# Patient Record
Sex: FEMALE | Race: WHITE | NOT HISPANIC OR LATINO | URBAN - METROPOLITAN AREA
[De-identification: names, ages, dates, MRNs, and addresses within clinical notes are randomized per-mention and may not be internally consistent; named-entity substitution may affect disease eponyms.]

---

## 2017-03-31 ENCOUNTER — APPOINTMENT (RX ONLY)
Dept: URBAN - METROPOLITAN AREA CLINIC 349 | Facility: CLINIC | Age: 72
Setting detail: DERMATOLOGY
End: 2017-03-31

## 2017-03-31 DIAGNOSIS — L57.0 ACTINIC KERATOSIS: ICD-10-CM

## 2017-03-31 DIAGNOSIS — L21.8 OTHER SEBORRHEIC DERMATITIS: ICD-10-CM | Status: INADEQUATELY CONTROLLED

## 2017-03-31 PROBLEM — E78.5 HYPERLIPIDEMIA, UNSPECIFIED: Status: ACTIVE | Noted: 2017-03-31

## 2017-03-31 PROBLEM — M12.9 ARTHROPATHY, UNSPECIFIED: Status: ACTIVE | Noted: 2017-03-31

## 2017-03-31 PROBLEM — L29.8 OTHER PRURITUS: Status: ACTIVE | Noted: 2017-03-31

## 2017-03-31 PROCEDURE — 99202 OFFICE O/P NEW SF 15 MIN: CPT | Mod: 25

## 2017-03-31 PROCEDURE — 17000 DESTRUCT PREMALG LESION: CPT

## 2017-03-31 PROCEDURE — ? PRESCRIPTION

## 2017-03-31 PROCEDURE — ? COUNSELING

## 2017-03-31 PROCEDURE — ? LIQUID NITROGEN

## 2017-03-31 RX ORDER — CLOBETASOL PROPIONATE 0.46 MG/ML
SOLUTION TOPICAL
Qty: 1 | Refills: 5 | Status: ERX | COMMUNITY
Start: 2017-03-31

## 2017-03-31 RX ADMIN — CLOBETASOL PROPIONATE: 0.46 SOLUTION TOPICAL at 00:00

## 2017-03-31 ASSESSMENT — LOCATION DETAILED DESCRIPTION DERM
LOCATION DETAILED: LEFT INFERIOR CENTRAL MALAR CHEEK
LOCATION DETAILED: NASAL SUPRATIP

## 2017-03-31 ASSESSMENT — LOCATION SIMPLE DESCRIPTION DERM
LOCATION SIMPLE: NOSE
LOCATION SIMPLE: LEFT CHEEK

## 2017-03-31 ASSESSMENT — LOCATION ZONE DERM
LOCATION ZONE: FACE
LOCATION ZONE: NOSE

## 2017-03-31 ASSESSMENT — PAIN INTENSITY VAS: HOW INTENSE IS YOUR PAIN 0 BEING NO PAIN, 10 BEING THE MOST SEVERE PAIN POSSIBLE?: NO PAIN

## 2017-03-31 NOTE — PROCEDURE: LIQUID NITROGEN
Render Post-Care Instructions In Note?: no
Number Of Freeze-Thaw Cycles: 2 freeze-thaw cycles
Duration Of Freeze Thaw-Cycle (Seconds): 3
Consent: The patient's consent was obtained including but not limited to risks of crusting, scabbing, blistering, scarring, darker or lighter pigmentary change, recurrence, incomplete removal and infection.
Post-Care Instructions: I reviewed with the patient in detail post-care instructions. Patient is to wear sunprotection, and avoid picking at any of the treated lesions. Pt may apply Vaseline to crusted or scabbing areas.
Detail Level: Detailed

## 2017-04-06 PROBLEM — G62.9 NEUROPATHY: Chronic | Status: ACTIVE | Noted: 2017-04-06

## 2017-05-03 ENCOUNTER — APPOINTMENT (RX ONLY)
Dept: URBAN - METROPOLITAN AREA CLINIC 349 | Facility: CLINIC | Age: 72
Setting detail: DERMATOLOGY
End: 2017-05-03

## 2017-05-03 DIAGNOSIS — L21.8 OTHER SEBORRHEIC DERMATITIS: ICD-10-CM | Status: UNCHANGED

## 2017-05-03 PROBLEM — I25.10 ATHEROSCLEROTIC HEART DISEASE OF NATIVE CORONARY ARTERY WITHOUT ANGINA PECTORIS: Status: ACTIVE | Noted: 2017-05-03

## 2017-05-03 PROBLEM — L70.0 ACNE VULGARIS: Status: ACTIVE | Noted: 2017-05-03

## 2017-05-03 PROBLEM — H91.90 UNSPECIFIED HEARING LOSS, UNSPECIFIED EAR: Status: ACTIVE | Noted: 2017-05-03

## 2017-05-03 PROBLEM — I10 ESSENTIAL (PRIMARY) HYPERTENSION: Status: ACTIVE | Noted: 2017-05-03

## 2017-05-03 PROBLEM — K21.9 GASTRO-ESOPHAGEAL REFLUX DISEASE WITHOUT ESOPHAGITIS: Status: ACTIVE | Noted: 2017-05-03

## 2017-05-03 PROBLEM — E13.9 OTHER SPECIFIED DIABETES MELLITUS WITHOUT COMPLICATIONS: Status: ACTIVE | Noted: 2017-05-03

## 2017-05-03 PROCEDURE — ? TREATMENT REGIMEN

## 2017-05-03 PROCEDURE — ? PRESCRIPTION

## 2017-05-03 PROCEDURE — ? COUNSELING

## 2017-05-03 PROCEDURE — 99213 OFFICE O/P EST LOW 20 MIN: CPT

## 2017-05-03 RX ORDER — FLUOCINOLONE ACETONIDE 0.11 MG/ML
OIL TOPICAL
Qty: 1 | Refills: 5 | Status: ERX | COMMUNITY
Start: 2017-05-03

## 2017-05-03 RX ADMIN — FLUOCINOLONE ACETONIDE: 0.11 OIL TOPICAL at 14:24

## 2017-05-03 ASSESSMENT — LOCATION ZONE DERM
LOCATION ZONE: SCALP
LOCATION ZONE: SCALP
LOCATION ZONE: FACE

## 2017-05-03 ASSESSMENT — LOCATION SIMPLE DESCRIPTION DERM
LOCATION SIMPLE: LEFT SCALP
LOCATION SIMPLE: POSTERIOR SCALP
LOCATION SIMPLE: ANTERIOR SCALP
LOCATION SIMPLE: FRONTAL SCALP
LOCATION SIMPLE: SCALP
LOCATION SIMPLE: LEFT FOREHEAD

## 2017-05-03 ASSESSMENT — LOCATION DETAILED DESCRIPTION DERM
LOCATION DETAILED: RIGHT SUPERIOR OCCIPITAL SCALP
LOCATION DETAILED: MEDIAL FRONTAL SCALP
LOCATION DETAILED: LEFT MEDIAL FRONTAL SCALP
LOCATION DETAILED: LEFT SUPERIOR MEDIAL FOREHEAD
LOCATION DETAILED: LEFT SUPERIOR PARIETAL SCALP
LOCATION DETAILED: MID-FRONTAL SCALP

## 2018-02-01 ENCOUNTER — HOSPITAL ENCOUNTER (OUTPATIENT)
Dept: LAB | Age: 73
Discharge: HOME | End: 2018-02-01
Payer: MEDICARE

## 2018-02-01 DIAGNOSIS — R07.89 CHEST DISCOMFORT: ICD-10-CM

## 2018-02-01 DIAGNOSIS — I25.118 ATHEROSCLEROSIS OF NATIVE CORONARY ARTERY OF NATIVE HEART WITH STABLE ANGINA PECTORIS (HCC): Chronic | ICD-10-CM

## 2018-02-01 LAB
ANION GAP SERPL CALC-SCNC: 4 MMOL/L
BASOPHILS # BLD: 0 K/UL (ref 0–0.2)
BASOPHILS NFR BLD: 1 % (ref 0–2)
BUN SERPL-MCNC: 14 MG/DL (ref 8–23)
CALCIUM SERPL-MCNC: 9.6 MG/DL (ref 8.3–10.4)
CHLORIDE SERPL-SCNC: 104 MMOL/L (ref 98–107)
CO2 SERPL-SCNC: 33 MMOL/L (ref 21–32)
CREAT SERPL-MCNC: 1 MG/DL (ref 0.6–1)
DIFFERENTIAL METHOD BLD: ABNORMAL
EOSINOPHIL # BLD: 0.2 K/UL (ref 0–0.8)
EOSINOPHIL NFR BLD: 3 % (ref 0.5–7.8)
ERYTHROCYTE [DISTWIDTH] IN BLOOD BY AUTOMATED COUNT: 15.5 % (ref 11.9–14.6)
GLUCOSE SERPL-MCNC: 103 MG/DL (ref 65–100)
HCT VFR BLD AUTO: 34.8 % (ref 35.8–46.3)
HGB BLD-MCNC: 11.4 G/DL (ref 11.7–15.4)
LYMPHOCYTES # BLD: 2.8 K/UL (ref 0.5–4.6)
LYMPHOCYTES NFR BLD: 46 % (ref 13–44)
MCH RBC QN AUTO: 30.6 PG (ref 26.1–32.9)
MCHC RBC AUTO-ENTMCNC: 32.8 G/DL (ref 31.4–35)
MCV RBC AUTO: 93.3 FL (ref 79.6–97.8)
MONOCYTES # BLD: 0.7 K/UL (ref 0.1–1.3)
MONOCYTES NFR BLD: 11 % (ref 4–12)
NEUTS SEG # BLD: 2.4 K/UL (ref 1.7–8.2)
NEUTS SEG NFR BLD: 39 % (ref 43–78)
PLATELET # BLD AUTO: 271 K/UL (ref 150–450)
PMV BLD AUTO: 10.4 FL (ref 10.8–14.1)
POTASSIUM SERPL-SCNC: 4.1 MMOL/L (ref 3.5–5.1)
RBC # BLD AUTO: 3.73 M/UL (ref 4.05–5.25)
SODIUM SERPL-SCNC: 141 MMOL/L (ref 136–145)
WBC # BLD AUTO: 6 K/UL (ref 4.3–11.1)

## 2018-02-01 PROCEDURE — 80048 BASIC METABOLIC PNL TOTAL CA: CPT | Performed by: INTERNAL MEDICINE

## 2018-02-01 PROCEDURE — 85025 COMPLETE CBC W/AUTO DIFF WBC: CPT | Performed by: INTERNAL MEDICINE

## 2018-02-01 PROCEDURE — 36415 COLL VENOUS BLD VENIPUNCTURE: CPT | Performed by: INTERNAL MEDICINE

## 2018-02-08 ENCOUNTER — HOSPITAL ENCOUNTER (OUTPATIENT)
Dept: CARDIAC CATH/INVASIVE PROCEDURES | Age: 73
Setting detail: OBSERVATION
Discharge: HOME | End: 2018-02-09
Attending: INTERNAL MEDICINE | Admitting: INTERNAL MEDICINE
Payer: MEDICARE

## 2018-02-08 PROBLEM — I25.10 CAD (CORONARY ARTERY DISEASE): Status: ACTIVE | Noted: 2018-02-08

## 2018-02-08 LAB
ANION GAP SERPL CALC-SCNC: 10 MMOL/L (ref 7–16)
ATRIAL RATE: 61 BPM
BUN SERPL-MCNC: 14 MG/DL (ref 8–23)
CALCIUM SERPL-MCNC: 9.4 MG/DL (ref 8.3–10.4)
CALCULATED P AXIS, ECG09: 45 DEGREES
CALCULATED R AXIS, ECG10: -55 DEGREES
CALCULATED T AXIS, ECG11: 74 DEGREES
CHLORIDE SERPL-SCNC: 105 MMOL/L (ref 98–107)
CO2 SERPL-SCNC: 26 MMOL/L (ref 21–32)
CREAT SERPL-MCNC: 1.27 MG/DL (ref 0.6–1)
DIAGNOSIS, 93000: NORMAL
GLUCOSE BLD STRIP.AUTO-MCNC: 284 MG/DL (ref 65–100)
GLUCOSE SERPL-MCNC: 124 MG/DL (ref 65–100)
P-R INTERVAL, ECG05: 226 MS
POTASSIUM SERPL-SCNC: 4 MMOL/L (ref 3.5–5.1)
Q-T INTERVAL, ECG07: 436 MS
QRS DURATION, ECG06: 90 MS
QTC CALCULATION (BEZET), ECG08: 438 MS
SODIUM SERPL-SCNC: 141 MMOL/L (ref 136–145)
VENTRICULAR RATE, ECG03: 61 BPM

## 2018-02-08 PROCEDURE — C1769 GUIDE WIRE: HCPCS

## 2018-02-08 PROCEDURE — 80048 BASIC METABOLIC PNL TOTAL CA: CPT | Performed by: INTERNAL MEDICINE

## 2018-02-08 PROCEDURE — C1894 INTRO/SHEATH, NON-LASER: HCPCS

## 2018-02-08 PROCEDURE — 77030012468 HC VLV BLEEDBK CNTRL ABBT -B

## 2018-02-08 PROCEDURE — 74011250636 HC RX REV CODE- 250/636

## 2018-02-08 PROCEDURE — 99152 MOD SED SAME PHYS/QHP 5/>YRS: CPT

## 2018-02-08 PROCEDURE — 99153 MOD SED SAME PHYS/QHP EA: CPT

## 2018-02-08 PROCEDURE — 77030004559 HC CATH ANGI DX SUPT CARD -B

## 2018-02-08 PROCEDURE — 74011250637 HC RX REV CODE- 250/637: Performed by: INTERNAL MEDICINE

## 2018-02-08 PROCEDURE — 77030019569 HC BND COMPR RAD TERU -B

## 2018-02-08 PROCEDURE — C1874 STENT, COATED/COV W/DEL SYS: HCPCS

## 2018-02-08 PROCEDURE — 82962 GLUCOSE BLOOD TEST: CPT

## 2018-02-08 PROCEDURE — 92928 PRQ TCAT PLMT NTRAC ST 1 LES: CPT

## 2018-02-08 PROCEDURE — 74011636320 HC RX REV CODE- 636/320: Performed by: INTERNAL MEDICINE

## 2018-02-08 PROCEDURE — 74011000250 HC RX REV CODE- 250: Performed by: INTERNAL MEDICINE

## 2018-02-08 PROCEDURE — 74011000258 HC RX REV CODE- 258: Performed by: INTERNAL MEDICINE

## 2018-02-08 PROCEDURE — C1887 CATHETER, GUIDING: HCPCS

## 2018-02-08 PROCEDURE — 77030015766

## 2018-02-08 PROCEDURE — 74011250636 HC RX REV CODE- 250/636: Performed by: INTERNAL MEDICINE

## 2018-02-08 PROCEDURE — 93005 ELECTROCARDIOGRAM TRACING: CPT | Performed by: INTERNAL MEDICINE

## 2018-02-08 PROCEDURE — 99218 HC RM OBSERVATION: CPT

## 2018-02-08 PROCEDURE — C1725 CATH, TRANSLUMIN NON-LASER: HCPCS

## 2018-02-08 PROCEDURE — 93458 L HRT ARTERY/VENTRICLE ANGIO: CPT

## 2018-02-08 RX ORDER — LISINOPRIL 5 MG/1
10 TABLET ORAL DAILY
Status: DISCONTINUED | OUTPATIENT
Start: 2018-02-09 | End: 2018-02-09 | Stop reason: HOSPADM

## 2018-02-08 RX ORDER — DIPHENHYDRAMINE HYDROCHLORIDE 50 MG/ML
50 INJECTION, SOLUTION INTRAMUSCULAR; INTRAVENOUS ONCE
Status: COMPLETED | OUTPATIENT
Start: 2018-02-08 | End: 2018-02-08

## 2018-02-08 RX ORDER — ASPIRIN 81 MG/1
81 TABLET ORAL
Status: DISCONTINUED | OUTPATIENT
Start: 2018-02-08 | End: 2018-02-09 | Stop reason: HOSPADM

## 2018-02-08 RX ORDER — FAMOTIDINE 10 MG/ML
20 INJECTION INTRAVENOUS ONCE
Status: COMPLETED | OUTPATIENT
Start: 2018-02-08 | End: 2018-02-08

## 2018-02-08 RX ORDER — PREGABALIN 150 MG/1
150 CAPSULE ORAL EVERY 12 HOURS
Status: DISCONTINUED | OUTPATIENT
Start: 2018-02-08 | End: 2018-02-09 | Stop reason: HOSPADM

## 2018-02-08 RX ORDER — HYDROCORTISONE SODIUM SUCCINATE 100 MG/2ML
100 INJECTION, POWDER, FOR SOLUTION INTRAMUSCULAR; INTRAVENOUS ONCE
Status: COMPLETED | OUTPATIENT
Start: 2018-02-08 | End: 2018-02-08

## 2018-02-08 RX ORDER — ACETAMINOPHEN 325 MG/1
650 TABLET ORAL
Status: DISCONTINUED | OUTPATIENT
Start: 2018-02-08 | End: 2018-02-09 | Stop reason: HOSPADM

## 2018-02-08 RX ORDER — CLOPIDOGREL BISULFATE 75 MG/1
600 TABLET ORAL ONCE
Status: COMPLETED | OUTPATIENT
Start: 2018-02-08 | End: 2018-02-08

## 2018-02-08 RX ORDER — SODIUM CHLORIDE 0.9 % (FLUSH) 0.9 %
5-10 SYRINGE (ML) INJECTION EVERY 8 HOURS
Status: DISCONTINUED | OUTPATIENT
Start: 2018-02-08 | End: 2018-02-09 | Stop reason: HOSPADM

## 2018-02-08 RX ORDER — ROSUVASTATIN CALCIUM 20 MG/1
40 TABLET, COATED ORAL DAILY
Status: DISCONTINUED | OUTPATIENT
Start: 2018-02-09 | End: 2018-02-09 | Stop reason: HOSPADM

## 2018-02-08 RX ORDER — DIPHENHYDRAMINE HCL 25 MG
50 CAPSULE ORAL
Status: DISCONTINUED | OUTPATIENT
Start: 2018-02-08 | End: 2018-02-09 | Stop reason: HOSPADM

## 2018-02-08 RX ORDER — SODIUM CHLORIDE 9 MG/ML
100 INJECTION, SOLUTION INTRAVENOUS CONTINUOUS
Status: DISCONTINUED | OUTPATIENT
Start: 2018-02-08 | End: 2018-02-09

## 2018-02-08 RX ORDER — HEPARIN SODIUM 200 [USP'U]/100ML
3 INJECTION, SOLUTION INTRAVENOUS CONTINUOUS
Status: DISCONTINUED | OUTPATIENT
Start: 2018-02-08 | End: 2018-02-08 | Stop reason: HOSPADM

## 2018-02-08 RX ORDER — SODIUM CHLORIDE 9 MG/ML
75 INJECTION, SOLUTION INTRAVENOUS CONTINUOUS
Status: DISCONTINUED | OUTPATIENT
Start: 2018-02-08 | End: 2018-02-09

## 2018-02-08 RX ORDER — FENTANYL CITRATE 50 UG/ML
25-100 INJECTION, SOLUTION INTRAMUSCULAR; INTRAVENOUS
Status: DISCONTINUED | OUTPATIENT
Start: 2018-02-08 | End: 2018-02-08 | Stop reason: HOSPADM

## 2018-02-08 RX ORDER — CLOPIDOGREL BISULFATE 75 MG/1
75 TABLET ORAL DAILY
Status: DISCONTINUED | OUTPATIENT
Start: 2018-02-09 | End: 2018-02-09 | Stop reason: HOSPADM

## 2018-02-08 RX ORDER — DIAZEPAM 5 MG/1
5 TABLET ORAL ONCE
Status: DISCONTINUED | OUTPATIENT
Start: 2018-02-08 | End: 2018-02-08 | Stop reason: HOSPADM

## 2018-02-08 RX ORDER — GUAIFENESIN 100 MG/5ML
81 LIQUID (ML) ORAL DAILY
Status: DISCONTINUED | OUTPATIENT
Start: 2018-02-09 | End: 2018-02-08 | Stop reason: SDUPTHER

## 2018-02-08 RX ORDER — SODIUM CHLORIDE 0.9 % (FLUSH) 0.9 %
5-10 SYRINGE (ML) INJECTION AS NEEDED
Status: DISCONTINUED | OUTPATIENT
Start: 2018-02-08 | End: 2018-02-09 | Stop reason: HOSPADM

## 2018-02-08 RX ORDER — LIDOCAINE HYDROCHLORIDE 20 MG/ML
1-20 INJECTION, SOLUTION INFILTRATION; PERINEURAL
Status: DISCONTINUED | OUTPATIENT
Start: 2018-02-08 | End: 2018-02-08 | Stop reason: HOSPADM

## 2018-02-08 RX ORDER — GUAIFENESIN 100 MG/5ML
81-324 LIQUID (ML) ORAL ONCE
Status: ACTIVE | OUTPATIENT
Start: 2018-02-08 | End: 2018-02-08

## 2018-02-08 RX ORDER — DULOXETIN HYDROCHLORIDE 60 MG/1
60 CAPSULE, DELAYED RELEASE ORAL
Status: DISCONTINUED | OUTPATIENT
Start: 2018-02-08 | End: 2018-02-09 | Stop reason: HOSPADM

## 2018-02-08 RX ORDER — LORAZEPAM 1 MG/1
1 TABLET ORAL
Status: DISCONTINUED | OUTPATIENT
Start: 2018-02-08 | End: 2018-02-09 | Stop reason: HOSPADM

## 2018-02-08 RX ORDER — NITROGLYCERIN 0.4 MG/1
0.4 TABLET SUBLINGUAL
Status: DISCONTINUED | OUTPATIENT
Start: 2018-02-08 | End: 2018-02-09 | Stop reason: HOSPADM

## 2018-02-08 RX ORDER — MIDAZOLAM HYDROCHLORIDE 1 MG/ML
.5-5 INJECTION, SOLUTION INTRAMUSCULAR; INTRAVENOUS
Status: DISCONTINUED | OUTPATIENT
Start: 2018-02-08 | End: 2018-02-08 | Stop reason: HOSPADM

## 2018-02-08 RX ADMIN — HYDROCORTISONE SODIUM SUCCINATE 100 MG: 100 INJECTION, POWDER, FOR SOLUTION INTRAMUSCULAR; INTRAVENOUS at 11:00

## 2018-02-08 RX ADMIN — Medication 10 ML: at 21:00

## 2018-02-08 RX ADMIN — IOPAMIDOL 165 ML: 755 INJECTION, SOLUTION INTRAVENOUS at 13:04

## 2018-02-08 RX ADMIN — Medication 5 ML: at 15:30

## 2018-02-08 RX ADMIN — BIVALIRUDIN 1.75 MG/KG/HR: 250 INJECTION, POWDER, LYOPHILIZED, FOR SOLUTION INTRAVENOUS at 12:40

## 2018-02-08 RX ADMIN — FENTANYL CITRATE 50 MCG: 50 INJECTION, SOLUTION INTRAMUSCULAR; INTRAVENOUS at 12:25

## 2018-02-08 RX ADMIN — SODIUM CHLORIDE 75 ML/HR: 900 INJECTION, SOLUTION INTRAVENOUS at 15:14

## 2018-02-08 RX ADMIN — MIDAZOLAM HYDROCHLORIDE 2 MG: 1 INJECTION, SOLUTION INTRAMUSCULAR; INTRAVENOUS at 12:25

## 2018-02-08 RX ADMIN — LIDOCAINE HYDROCHLORIDE 60 MG: 20 INJECTION, SOLUTION INFILTRATION; PERINEURAL at 12:29

## 2018-02-08 RX ADMIN — HEPARIN 3 ML/HR: 100 SYRINGE at 12:30

## 2018-02-08 RX ADMIN — CLOPIDOGREL BISULFATE 600 MG: 75 TABLET ORAL at 13:04

## 2018-02-08 RX ADMIN — HEPARIN SODIUM 2 ML: 10000 INJECTION, SOLUTION INTRAVENOUS; SUBCUTANEOUS at 12:30

## 2018-02-08 RX ADMIN — DIPHENHYDRAMINE HYDROCHLORIDE 50 MG: 50 INJECTION, SOLUTION INTRAMUSCULAR; INTRAVENOUS at 11:00

## 2018-02-08 RX ADMIN — FAMOTIDINE 20 MG: 10 INJECTION, SOLUTION INTRAVENOUS at 11:00

## 2018-02-08 NOTE — PROGRESS NOTES
Patient received to [address] [place] [address] room # 1  Via wheelchair from [employer]. Patient scheduled for [facility] today with Dr Malachi Gross. Procedure reviewed & questions answered, voiced good understanding consent obtained & placed on chart. All medications and medical history reviewed. Will prep patient per orders. Patient & family updated on plan of care.

## 2018-02-08 NOTE — PROGRESS NOTES
TRANSFER - OUT REPORT:    Verbal report given to Zafar Patel on Candy Tarango  being transferred to [facility] for routine post - op       Report consisted of patients Situation, Background, Assessment and   Recommendations(SBAR). Information from the following report(s) SBAR, Kardex, Procedure Summary, Intake/Output, MAR and Cardiac Rhythm NSR was reviewed with the receiving nurse. Lines:   Peripheral IV 02/08/18 Right Forearm (Active)   Site Assessment Clean, dry, & intact; Clean;Dry 2/8/2018  1:47 PM   Phlebitis Assessment 0 2/8/2018  1:47 PM   Infiltration Assessment 0 2/8/2018  1:47 PM   Dressing Status Clean, dry, & intact; Clean;Dry 2/8/2018  1:47 PM   Dressing Type Tape;Transparent 2/8/2018  1:47 PM   Hub Color/Line Status Patent; Infusing;Pink 2/8/2018  1:47 PM        Opportunity for questions and clarification was provided.       Patient transported with:   Monitor  Tech

## 2018-02-08 NOTE — IP AVS SNAPSHOT
[address] [address] 
[phone] Patient: Osmin Hardin MRN: [id number] BLK:[birth year]25 About your hospitalization You were admitted on:  February 8, 2018 You last received care in the:  [facility] 3 CLINICAL OBSERVATION You were discharged on:  February 9, 2018 Why you were hospitalized Your primary diagnosis was:  Coronary Atherosclerosis Of Native Coronary Artery Your diagnoses also included:  Hypertension, Hyperlipidemia, Dm2 (Diabetes Mellitus, Type 2) (Hcc), Cad (Coronary Artery Disease) Follow-up Information Follow up With Details Comments Contact Info Joyce Lynn MD   [address] [facility] [address] 
[phone] Laci Nicole MD On 2/22/2018 945 am in [facility] [address]  [address] [facility] [address] 
[phone] Your Scheduled Appointments Thursday February 22, 2018  9:45 AM [facility] HOSPITAL FOLLOW-UP with aLci Nicole MD  
One [facility] ([facility]) 2 [facility] 
[address] [place]  
[phone] Discharge Orders Procedure Order Date Status Priority Quantity Spec Type Associated Dx REFERRAL TO CARDIAC [facility] 02/09/18 0713 Normal Routine 1 A check yeny indicates which time of day the medication should be taken. My Medications CONTINUE taking these medications Instructions Each Dose to Equal  
 Morning Noon Evening Bedtime  
 aspirin delayed-release 81 mg tablet Your next dose is:  2/9 Take 81 mg by mouth nightly. 81 mg  
    
   
   
  
   
  
 BENADRYL 25 mg capsule Generic drug:  diphenhydrAMINE Notes to Patient:  As needed Take 50 mg by mouth nightly as needed. Indications: Insomnia 50 mg  
    
   
   
   
  
 clopidogrel 75 mg Tab Commonly known as:  PLAVIX Your next dose is:  2/10 Take 1 Tab by mouth daily. 75 mg  
    
  
   
   
   
  
 CYMBALTA PO Your next dose is:  2/9 Take 60 mg by mouth nightly. Indications: neuropathy 60 mg  
    
   
   
  
   
  
 DAILY MULTI-VITAMINS/IRON tablet Generic drug:  multivitamin with iron Your next dose is:  2/9 Take 1 Tab by mouth daily. 1 Tab HAIR,SKIN AND NAILS PO Your next dose is:  2/9 Take  by mouth daily. lisinopril 10 mg tablet Commonly known as:  Mery Limon Your next dose is:  2/10 Take 10 mg by mouth daily. Indications: HYPERTENSION 10 mg  
    
  
   
   
   
  
 LYRICA PO Your next dose is:  2/9 Take 150 mg by mouth two (2) times a day. Indications: neuropathy 150 mg METFORMIN PO Your next dose is:  2/11 Notes to Patient:  DO NOT RESUME UNTIL 2/11 Take 500 mg by mouth two (2) times a day. 500 mg NexIUM 40 mg capsule Generic drug:  esomeprazole Your next dose is:  2/10 Take 40 mg by mouth Every morning (before breakfast). Indications: GERD 40 mg  
    
  
   
   
   
  
 nitroglycerin 0.4 mg SL tablet Commonly known as:  NITROSTAT Notes to Patient:  As needed for chest pain 1 Tab by SubLINGual route as needed for Chest Pain for up to 3 doses. 0.4 mg  
    
   
   
   
  
 rosuvastatin 40 mg tablet Commonly known as:  CRESTOR Your next dose is:  2/9 TAKE 1 TAB BY MOUTH NIGHTLY. VITAMIN B-12 INJECTION Notes to Patient:  30 days from last dose  
   
 by Injection route every thirty (30) days. Discharge Instructions Resume metformin on 2/11/18 Cardiac Catheterization/Angiography Discharge Instructions *Check the puncture site frequently for swelling or bleeding.  If you see any bleeding, lie down and apply pressure over the area with a clean town or washcloth. Notify your doctor for any redness, swelling, drainage or oozing from the puncture site. Notify your doctor for any fever or chills. *If the leg or arm with the puncture becomes cold, numb or painful, call [facility] Cardiology at  [phone]. *Activity should be limited for the next 48 hours. Climb stairs as little as possible and avoid any stooping, bending or strenuous activity for 48 hours. No heavy lifting (anything over 10 pounds) for three days. *Do not drive for 48 hours. *You may resume your usual diet. Drink more fluids than usual. 
 
*Have a responsible person drive you home and stay with you for at least 24 hours after your heart catheterization/angiography. *You may remove the bandage from your Right and Arm in 24 hours. You may shower in 24 hours. No tub baths, hot tubs or swimming for one week. Do not place any lotions, creams, powders, ointments over the puncture site for one week. You may place a clean band-aid over the puncture site each day for 5 days. Change this daily. Percutaneous Coronary Intervention: What to Expect at [facility] Your Recovery Percutaneous coronary intervention (PCI) is the name for procedures that are used to open a narrowed or blocked coronary artery. The two most common PCI procedures are coronary angioplasty and coronary stent placement. Your groin or arm may have a bruise and feel sore for a day or two after a percutaneous coronary intervention (PCI). You can do light activities around the house, but nothing strenuous for several days. This care sheet gives you a general idea about how long it will take for you to recover. But each person recovers at a different pace. Follow the steps below to get better as quickly as possible. How can you care for yourself at home? Activity ? · If the doctor gave you a sedative: ¨ For 24 hours, don't do anything that requires attention to detail. It takes time for the medicine's effects to completely wear off. ¨ For your safety, do not drive or operate any machinery that could be dangerous. Wait until the medicine wears off and you can think clearly and react easily. ? · Do not do strenuous exercise and do not lift, pull, or push anything heavy until your doctor says it is okay. This may be for a day or two. You can walk around the house and do light activity, such as cooking. ? · If the catheter was placed in your groin, try not to walk up stairs for the first couple of days. ? · If the catheter was placed in your arm near your wrist, do not bend your wrist deeply for the first couple of days. Be careful using your hand to get into and out of a chair or bed. ? · Carry your stent identification card with you at all times. ? · If your doctor recommends it, get more exercise. Walking is a good choice. Bit by bit, increase the amount you walk every day. Try for at least 30 minutes on most days of the week. Diet ? · Drink plenty of fluids to help your body flush out the dye. If you have kidney, heart, or liver disease and have to limit fluids, talk with your doctor before you increase the amount of fluids you drink. ? · Keep eating a heart-healthy diet that has lots of fruits, vegetables, and whole grains. If you have not been eating this way, talk to your doctor. You also may want to talk to a dietitian. This expert can help you to learn about healthy foods and plan meals. Medicines ? · Your doctor will tell you if and when you can restart your medicines. He or she will also give you instructions about taking any new medicines. ? · If you take blood thinners, such as warfarin (Coumadin), clopidogrel (Plavix), or aspirin, be sure to talk to your doctor. He or she will tell you if and when to start taking those medicines again.  Make sure that you understand exactly what your doctor wants you to do.  
? · Your doctor will prescribe blood-thinning medicines. You will likely take aspirin plus another antiplatelet, such as clopidogrel (Plavix). It is very important that you take these medicines exactly as directed. These medicines help keep the coronary artery open and reduce your risk of a heart attack. ? · Call your doctor if you think you are having a problem with your medicine. ?Care of the catheter site ? · For 1 or 2 days, keep a bandage over the spot where the catheter was inserted. The bandage probably will fall off in this time. ? · Put ice or a cold pack on the area for 10 to 20 minutes at a time to help with soreness or swelling. Put a thin cloth between the ice and your skin. ? · You may shower 24 to 48 hours after the procedure, if your doctor okays it. Pat the incision dry. ? · Do not soak the catheter site until it is healed. Don't take a bath for 1 week, or until your doctor tells you it isokay. Follow-up care is a key part of your treatment and safety. Be sure to make and go to all appointments, and call your doctor if you are having problems. It's also a good idea to know your test results and keep a list of the medicines you take. When should you call for help? Call 911 anytime you think you may need emergency care. For example, call if: 
? · You passed out (lost consciousness). ? · You have severe trouble breathing. ? · You have sudden chest pain and shortness of breath, or you cough up blood. ? · You have symptoms of a heart attack, such as: ¨ Chest pain or pressure. ¨ Sweating. ¨ Shortness of breath. ¨ Nausea or vomiting. ¨ Pain that spreads from the chest to the neck, jaw, or one or both shoulders or arms. ¨ Dizziness or lightheadedness. ¨ A fast or uneven pulse. After calling 911, chew 1 adult-strength aspirin. Wait for an ambulance. Do not try to drive yourself. ? · You have been diagnosed with angina, and you have angina symptoms that do not go away with rest or are not getting better within 5 minutes after you take one dose of nitroglycerin. ?Call your doctor now or seek immediate medical care if: 
? · You are bleeding from the area where the catheter was put in your artery. ? · You have a fast-growing, painful lump at the catheter site. ? · You have signs of infection, such as: 
¨ Increased pain, swelling, warmth, or redness. ¨ Red streaks leading from the catheter site. ¨ Pus draining from the catheter site. ¨ A fever. ? · Your leg or arm looks blue or feels cold, numb, or tingly. ? Watch closely for changes in your health, and be sure to contact your doctor if you have any problems. Where can you learn more? Go to http://lisa-guerda.info/. Enter D441 in the search box to learn more about \"Percutaneous Coronary Intervention: What to Expect at Home. \" Current as of: September 21, 2016 Content Version: 11.4 © 3395-0841 [employer]. Care instructions adapted under license by [employer] (which disclaims liability or warranty for this information). If you have questions about a medical condition or this instruction, always ask your healthcare professional. [address] any warranty or liability for your use of this information. DISCHARGE SUMMARY from Nurse PATIENT INSTRUCTIONS: 
 
After general anesthesia or intravenous sedation, for 24 hours or while taking prescription Narcotics: · Limit your activities · Do not drive and operate hazardous machinery · Do not make important personal or business decisions · Do  not drink alcoholic beverages · If you have not urinated within 8 hours after discharge, please contact your surgeon on call. Report the following to your surgeon: 
· Excessive pain, swelling, redness or odor of or around the surgical area · Temperature over 100.5 · Nausea and vomiting lasting longer than 4 hours or if unable to take medications · Any signs of decreased circulation or nerve impairment to extremity: change in color, persistent  numbness, tingling, coldness or increase pain · Any questions What to do at Home:The patient is being discharged home in stable condition on a low saturated fat, low cholesterol and low salt diet. The patient is instructed to advance activities as tolerated to the limit of fatigue or shortness of breath. The patient is instructed to avoid all heavy lifting for 3-5 days. The patient is instructed to watch the cath site for bleeding/oozing; if seen, the patient is instructed to apply firm pressure with a clean cloth and call [address] [facility] Cardiology at [phone]. The patient is instructed to watch for signs of infection which include: increasing area of redness, fever/hot to touch or purulent drainage at the catheterization site. The patient is instructed not to soak in a bathtub for 7-10 days, but is cleared to shower. The patient is instructed to call the office or return to the ER for immediate evaluation for any shortness of breath or chest pain not relieved by NTG. *  Please give a list of your current medications to your Primary Care Provider. *  Please update this list whenever your medications are discontinued, doses are 
    changed, or new medications (including over-the-counter products) are added. *  Please carry medication information at all times in case of emergency situations. These are general instructions for a healthy lifestyle: No smoking/ No tobacco products/ Avoid exposure to second hand smoke Surgeon General's Warning:  Quitting smoking now greatly reduces serious risk to your health. Obesity, smoking, and sedentary lifestyle greatly increases your risk for illness A healthy diet, regular physical exercise & weight monitoring are important for maintaining a healthy lifestyle You may be retaining fluid if you have a history of heart failure or if you experience any of the following symptoms:  Weight gain of 3 pounds or more overnight or 5 pounds in a week, increased swelling in our hands or feet or shortness of breath while lying flat in bed. Please call your doctor as soon as you notice any of these symptoms; do not wait until your next office visit. Recognize signs and symptoms of STROKE: 
 
F-face looks uneven A-arms unable to move or move unevenly S-speech slurred or non-existent T-time-call 911 as soon as signs and symptoms begin-DO NOT go Back to bed or wait to see if you get better-TIME IS BRAIN. Warning Signs of HEART ATTACK Call 911 if you have these symptoms: 
? Chest discomfort. Most heart attacks involve discomfort in the center of the chest that lasts more than a few minutes, or that goes away and comes back. It can feel like uncomfortable pressure, squeezing, fullness, or pain. ? Discomfort in other areas of the upper body. Symptoms can include pain or discomfort in one or both arms, the back, neck, jaw, or stomach. ? Shortness of breath with or without chest discomfort. ? Other signs may include breaking out in a cold sweat, nausea, or lightheadedness. Don't wait more than five minutes to call [address]! Fast action can save your life. Calling 911 is almost always the fastest way to get lifesaving treatment. Emergency Medical Services staff can begin treatment when they arrive  up to an hour sooner than if someone gets to the hospital by car. The discharge information has been reviewed with the patient. The patient verbalized understanding. Discharge medications reviewed with the patient and appropriate educational materials and side effects teaching were provided. ___________________________________________________________________________________________________________________________________ CleanApp Announcement We are excited to announce that we are making your provider's discharge notes available to you in CleanApp. You will see these notes when they are completed and signed by the physician that discharged you from your recent hospital stay. If you have any questions or concerns about any information you see in CleanApp, please call the Health Information Department where you were seen or reach out to your Primary Care Provider for more information about your plan of care. Introducing [facility]! Azucena Bergman introduces CleanApp patient portal. Now you can access parts of your medical record, email your doctor's office, and request medication refills online. 1. In your internet browser, go to [email or web address] 2. Click on the First Time User? Click Here link in the Sign In box. You will see the New Member Sign Up page. 3. Enter your CleanApp Access Code exactly as it appears below. You will not need to use this code after youve completed the sign-up process. If you do not sign up before the expiration date, you must request a new code. · CleanApp Access Code: [email or web address] Expires: 5/2/2018 10:01 AM 
 
4. Enter the last four digits of your Social Security Number (xxxx) and Date of Birth (mm/dd/yyyy) as indicated and click Submit. You will be taken to the next sign-up page. 5. Create a CleanApp ID. This will be your CleanApp login ID and cannot be changed, so think of one that is secure and easy to remember. 6. Create a CleanApp password. You can change your password at any time. 7. Enter your Password Reset Question and Answer. This can be used at a later time if you forget your password. 8. Enter your e-mail address. You will receive e-mail notification when new information is available in [address] 9. Click Sign Up. You can now view and download portions of your medical record.  
10. Click the Download Summary menu link to download a portable copy of your medical information. If you have questions, please visit the Frequently Asked Questions section of the MyChart website. Remember, [employer] is NOT to be used for urgent needs. For medical emergencies, dial 911. Now available from your iPhone and Android! Providers Seen During Your Hospitalization Provider Specialty Primary office phone Mandie Forte MD Cardiology [phone] Your Primary Care Physician (PCP) Primary Care Physician Office Phone Office Fax [address] [facility], [address] [place] You are allergic to the following Allergen Reactions Iodinated Contrast- Oral And Iv Dye Anaphylaxis Iothalamate Meglumine Anaphylaxis Allergic to conray ivp dye Adhesive Tape Other (comments)  
 blisters Codeine Itching Demerol (Meperidine) Swelling Dilaudid (Hydromorphone (Bulk)) Other (comments)  
 hallucinate Oxycodone Other (comments)  
 halucinations-- patient states it was oxycontin, not oxycodone Recent Documentation Height Weight Breastfeeding? BMI OB Status Smoking Status 1.676 m 79.5 kg No 28.29 kg/m2 Hysterectomy Never Smoker Emergency Contacts Name Discharge Info Relation Home Work Mobile Luis Haddad  Other Relative [6] [phone] Jaci Valencia  Child [2] [phone] Patient Belongings The following personal items are in your possession at time of discharge: 
  Dental Appliances: Partials, With patient  Visual Aid: Glasses, With patient      Home Medications: None   Jewelry: None  Clothing: At bedside    Other Valuables: Cell Phone, Eyeglasses Please provide this summary of care documentation to your next provider. Signatures-by signing, you are acknowledging that this After Visit Summary has been reviewed with you and you have received a copy.   
  
 
  
    
    
 Patient Signature: ____________________________________________________________ Date:  ____________________________________________________________  
  
Viviana Deiters Provider Signature:  ____________________________________________________________ Date:  ____________________________________________________________

## 2018-02-08 NOTE — PROGRESS NOTES
Report received from [facility] RN. Procedural findings communicated. Intra procedural  medication administration reviewed. Progression of care discussed.      Patient received into [address] [facility] 4 post sheath removal.     Access site without bleeding or swelling yes    Dressing dry and intact yes    Patient instructed to limit movement to right upper extremity    Routine post procedural vital signs and site assessment initiated yes

## 2018-02-08 NOTE — PROGRESS NOTES
Air removed per protocol, bleeding immediately seen. Air replaced per protocol. Air removed per protocol, hemostasis achieved. Band removed and gauze/tegaderm placed. Pt denies any pain, tingling,numbness to right radial. RUE restrictions reviewed with pt.

## 2018-02-08 NOTE — IP AVS SNAPSHOT
[address] [place] [address] 
[phone] Patient: Sasha Lyons MRN: [id number] LKO:[id number]/50/[id number] A check yeny indicates which time of day the medication should be taken. My Medications CONTINUE taking these medications Instructions Each Dose to Equal  
 Morning Noon Evening Bedtime  
 aspirin delayed-release 81 mg tablet Your next dose is:  2/9 Take 81 mg by mouth nightly. 81 mg  
    
   
   
  
   
  
 BENADRYL 25 mg capsule Generic drug:  diphenhydrAMINE Notes to Patient:  As needed Take 50 mg by mouth nightly as needed. Indications: Insomnia 50 mg  
    
   
   
   
  
 clopidogrel 75 mg Tab Commonly known as:  PLAVIX Your next dose is:  2/10 Take 1 Tab by mouth daily. 75 mg  
    
  
   
   
   
  
 CYMBALTA PO Your next dose is:  2/9 Take 60 mg by mouth nightly. Indications: neuropathy 60 mg  
    
   
   
  
   
  
 DAILY MULTI-VITAMINS/IRON tablet Generic drug:  multivitamin with iron Your next dose is:  2/9 Take 1 Tab by mouth daily. 1 Tab HAIR,SKIN AND NAILS PO Your next dose is:  2/9 Take  by mouth daily. lisinopril 10 mg tablet Commonly known as:  Romeo Dubonnet Your next dose is:  2/10 Take 10 mg by mouth daily. Indications: HYPERTENSION 10 mg  
    
  
   
   
   
  
 LYRICA PO Your next dose is:  2/9 Take 150 mg by mouth two (2) times a day. Indications: neuropathy 150 mg METFORMIN PO Your next dose is:  2/11 Notes to Patient:  DO NOT RESUME UNTIL 2/11 Take 500 mg by mouth two (2) times a day. 500 mg NexIUM 40 mg capsule Generic drug:  esomeprazole Your next dose is:  2/10 Take 40 mg by mouth Every morning (before breakfast). Indications: GERD  40 mg  
    
  
   
   
   
  
 nitroglycerin 0.4 mg SL tablet Commonly known as:  NITROSTAT Notes to Patient:  As needed for chest pain 1 Tab by SubLINGual route as needed for Chest Pain for up to 3 doses. 0.4 mg  
    
   
   
   
  
 rosuvastatin 40 mg tablet Commonly known as:  CRESTOR Your next dose is:  2/9 TAKE 1 TAB BY MOUTH NIGHTLY. VITAMIN B-12 INJECTION Notes to Patient:  30 days from last dose  
   
 by Injection route every thirty (30) days.

## 2018-02-08 NOTE — PROCEDURES
Brief Cardiac Procedure Note    Patient: Mary Stone MRN: [id number]  SSN: [id number]    YOB: 1945  Age: 67 y.o. Sex: female      Date of Procedure: 2/8/2018     Pre-procedure Diagnosis: Chest pain CCS Class III    Post-procedure Diagnosis: Coronary Artery Disease    Procedure: Left Heart Catheterization with Percutaneous Coronary Intervention    Brief Description of Procedure: See note    Performed By: Amandeep Guevara MD     Assistants: None    Anesthesia: Moderate Sedation    Estimated Blood Loss: Less than 10 mL      Specimens: None    Implants: None    Findings:   LV:  EDP 11mmHg  LM:  NML  LAD:  40% D1, 90% mid ISR  LCx:  Stent patent  RCA:  20-30% ISR    PCI mLAD 90-0%   2.5x20 NC [place]   2.5x28 Synergy   2.5x20 NC [place]    600mg clopidogrel    Complications: None    Recommendations: Continue medical therapy.     Signed By: Amandeep Guevara MD     February 8, 2018

## 2018-02-08 NOTE — PROCEDURES
[facility] [address] Guillens  MR#: [id number]  : 1945  ACCOUNT #: [de-identified]   DATE OF SERVICE: 2018    PRIMARY CARDIOLOGIST:  Dara Nguyen MD    PRIMARY CARE PHYSICIAN:  Richie Chaparro MD    BRIEF HISTORY:  The patient is a very pleasant 58-year-old female with history of diabetes, hypertension, dyslipidemia, complex coronary artery disease, presents now with accelerating angina consistent with class 3-4 angina, for repeat cardiac catheterization. DESCRIPTION OF PROCEDURE:  After informed consent, she was prepped and draped in usual sterile fashion. The right wrist was infiltrated with lidocaine and the right radial artery was accessed via micropuncture technique. A 6-[place] sheath was advanced. A 6-[place] JL4 was utilized for left coronary injection. A 6-[place] JR5 was utilized for right coronary injection and left ventricular pressure measurements. Isovue contrast utilized. CONSCIOUS SEDATION:  Start time 12:18 p.m., end time 1:02 p.m. MEDICATIONS:  2 mg of Versed and 50 mcg of fentanyl. MONITORING RN:  Wilbert Roche    FINDINGS:  1. Left ventricle:  Left ventriculogram not obtained. Left ventricular end diastolic pressure is measured at 11 mmHg and there is no aortic valve gradient. 2.  Left main:  Left main is large, mildly calcified, bifurcates into the LAD and circumflex system. Appears angiographically normal.    3.  Left anterior descending coronary artery: It is a large vessel. It is moderately calcified and tortuous. There is a very proximal first diagonal which has a 40% to 50% proximal stenosis. The mid vessel gives rise to a smaller second diagonal which has 20% to 30% stenosis. Beyond this there is a stent which has 90% to 95% in-stent restenosis extending distally. Beyond the stent there is an area of intramyocardial bridging in the LAD and then courses the apex disease free. 4.  Left circumflex coronary artery:   It is a relatively small vessel. There is a stent in the proximal portion that remains patent. There are 2 very small obtuse marginals which remain patent. 5.  Right coronary artery: It is a large anatomically dominant vessel. It has been extensively stented. There is 30% to 40% focal in-stent restenosis within the mid portion of the stent. There is a large posterior descending and posterolateral branches which are normal.    PERCUTANEOUS CORONARY INTERVENTION:    LESION:  Mid LAD. Pre-stenosis 90%, post-stenosis 0%. DETAILS:  The patient anticoagulated with Angiomax. A 6-[place] EBU 3.0 guide was utilized. [place] wire was placed distally. The lesion was predilated with 2.5 x 20 NC [place] balloon and subsequently stented with a 2.5 x 28 Synergy drug-eluting stent. The entire stented portion was post-dilated to high pressure with a 2.5 noncompliant balloon. This yielded an excellent angiographic result and the wire was removed. Orthogonal views were obtained. 600 mg of additional clopidogrel were administered in the lab. Successful hemostasis with pneumatic radial band. CONCLUSIONS:  1. Normal end-diastolic filling pressures. No LV gram obtained due to underlying renal insufficiency. 2.  High-grade diffuse in-stent restenosis of the mid LAD, status post Synergy drug-eluting stent x1.  3.  Mild to moderate first diagonal stenosis. 4.  Widely patent left circumflex stenting. 5.  Patent right coronary artery stenting with 30% to 40% in-stent restenosis within the mid portion of the stent. RECOMMENDATIONS:  Ongoing medical management for atherosclerotic coronary artery disease. Thank you for allowing us to participate in the care of this patient. If you have questions or concerns, please feel free to contact me.       MD MARY Castillo / Jethro Plan  D: 02/08/2018 13:23     T: 02/08/2018 13:47  JOB #: [id number]  CC: Essie Stevenson MD

## 2018-02-08 NOTE — PROGRESS NOTES
TRANSFER - OUT REPORT:    Verbal report given to Iraida Gutierrez RN(name) on Shaye Garcia  being transferred to cpru(unit) for routine progression of care       Report consisted of patients Situation, Background, Assessment and   Recommendations(SBAR). Information from the following report(s) Kardex was reviewed with the receiving nurse. is allergic to iodinated contrast- oral and iv dye; iothalamate meglumine; adhesive tape; codeine; demerol [meperidine]; dilaudid [hydromorphone (bulk)]; and oxycodone. Opportunity for questions and clarification was provided. Procedure Summary:Pt had LHC with 1 stent placed in LAD via R wrist, site sealed with R band using 12 ml at 1300 hrs.     Med Administration    Versed:  2 mg  Fentanyl: 50 mcg    Angiomax Stop Time: 8279    Visit Vitals    /68 (BP 1 Location: Left arm, BP Patient Position: Supine)    Pulse 61    Temp 98 °F (36.7 °C)    Resp 15    Ht 5' 6\" (1.676 m)    Wt 80.7 kg (178 lb)    SpO2 97%    Breastfeeding No    BMI 28.73 kg/m2     Past Medical History:   Diagnosis Date    Abnormal EKG 12/9/2015    CAD (coronary artery disease) 2009    MI & 4 stents    Chronic pain     left shoulder    Coagulation disorder (HCC)     anemia: pt reports after a surgery    Diabetes ([address] 2013    type 2; oral med    DJD (degenerative joint disease) 6/30/2009    DM2 (diabetes mellitus, type 2) ([address] 1/9/2015    Gastrointestinal disorder     GERD (gastroesophageal reflux disease)     controlled with med    Hypertension     well controlled with med    Nausea & vomiting     requests scopaolamine patch    Neuropathy     Other ill-defined conditions(799.89)     hyperlipidemia/dyslipidemia    Palpitations 12/9/2015    Unspecified adverse effect of anesthesia     pt reports she does not want nerve block with TSA; reports pain at injection site after previous block    Urinary tract infection 1/9/2015           Peripheral IV 02/08/18 Right Forearm (Active)

## 2018-02-08 NOTE — PROGRESS NOTES
Called to pre-assess for C , with Dr Luis Carlos Dubois, Scheduled 2/8/18. No answer & message left.

## 2018-02-08 NOTE — PROGRESS NOTES
Patient pre-assessment complete for [facility] poss with Dr Leonides Coley scheduled for 18 at 11am, arrival time 9am. Patient verified using . Patient instructed to bring all home medications in labeled bottles on the day of procedure. NPO status reinforced. Patient informed to take a full dose aspirin 325mg  or 81 mg x 4 on the day of procedure. Patient instructed to HOLD Metformin (last dose 18 am). Instructed they can take all other medications excluding vitamins & supplements. Patient verbalizes understanding of all instructions & denies any questions at this time.

## 2018-02-09 VITALS
WEIGHT: 175.3 LBS | DIASTOLIC BLOOD PRESSURE: 68 MMHG | TEMPERATURE: 96.8 F | BODY MASS INDEX: 28.17 KG/M2 | HEART RATE: 60 BPM | RESPIRATION RATE: 17 BRPM | OXYGEN SATURATION: 100 % | SYSTOLIC BLOOD PRESSURE: 116 MMHG | HEIGHT: 66 IN

## 2018-02-09 LAB
ANION GAP SERPL CALC-SCNC: 6 MMOL/L (ref 7–16)
ATRIAL RATE: 55 BPM
BASOPHILS # BLD: 0 K/UL (ref 0–0.2)
BASOPHILS NFR BLD: 0 % (ref 0–2)
BUN SERPL-MCNC: 17 MG/DL (ref 8–23)
CALCIUM SERPL-MCNC: 7.8 MG/DL (ref 8.3–10.4)
CALCULATED P AXIS, ECG09: 16 DEGREES
CALCULATED R AXIS, ECG10: -43 DEGREES
CALCULATED T AXIS, ECG11: 53 DEGREES
CHLORIDE SERPL-SCNC: 110 MMOL/L (ref 98–107)
CO2 SERPL-SCNC: 27 MMOL/L (ref 21–32)
CREAT SERPL-MCNC: 1.07 MG/DL (ref 0.6–1)
DIAGNOSIS, 93000: NORMAL
DIFFERENTIAL METHOD BLD: ABNORMAL
EOSINOPHIL # BLD: 0 K/UL (ref 0–0.8)
EOSINOPHIL NFR BLD: 1 % (ref 0.5–7.8)
ERYTHROCYTE [DISTWIDTH] IN BLOOD BY AUTOMATED COUNT: 15.9 % (ref 11.9–14.6)
GLUCOSE BLD STRIP.AUTO-MCNC: 101 MG/DL (ref 65–100)
GLUCOSE SERPL-MCNC: 95 MG/DL (ref 65–100)
HCT VFR BLD AUTO: 30.2 % (ref 35.8–46.3)
HGB BLD-MCNC: 10 G/DL (ref 11.7–15.4)
IMM GRANULOCYTES # BLD: 0 K/UL (ref 0–0.5)
IMM GRANULOCYTES NFR BLD AUTO: 0 % (ref 0–5)
LYMPHOCYTES # BLD: 2.5 K/UL (ref 0.5–4.6)
LYMPHOCYTES NFR BLD: 45 % (ref 13–44)
MCH RBC QN AUTO: 30.4 PG (ref 26.1–32.9)
MCHC RBC AUTO-ENTMCNC: 33.1 G/DL (ref 31.4–35)
MCV RBC AUTO: 91.8 FL (ref 79.6–97.8)
MONOCYTES # BLD: 0.6 K/UL (ref 0.1–1.3)
MONOCYTES NFR BLD: 12 % (ref 4–12)
NEUTS SEG # BLD: 2.2 K/UL (ref 1.7–8.2)
NEUTS SEG NFR BLD: 42 % (ref 43–78)
P-R INTERVAL, ECG05: 242 MS
PLATELET # BLD AUTO: 270 K/UL (ref 150–450)
PMV BLD AUTO: 10.6 FL (ref 10.8–14.1)
POTASSIUM SERPL-SCNC: 3.7 MMOL/L (ref 3.5–5.1)
Q-T INTERVAL, ECG07: 442 MS
QRS DURATION, ECG06: 92 MS
QTC CALCULATION (BEZET), ECG08: 422 MS
RBC # BLD AUTO: 3.29 M/UL (ref 4.05–5.25)
SODIUM SERPL-SCNC: 143 MMOL/L (ref 136–145)
VENTRICULAR RATE, ECG03: 55 BPM
WBC # BLD AUTO: 5.4 K/UL (ref 4.3–11.1)

## 2018-02-09 PROCEDURE — 36415 COLL VENOUS BLD VENIPUNCTURE: CPT | Performed by: INTERNAL MEDICINE

## 2018-02-09 PROCEDURE — 82962 GLUCOSE BLOOD TEST: CPT

## 2018-02-09 PROCEDURE — 93005 ELECTROCARDIOGRAM TRACING: CPT | Performed by: INTERNAL MEDICINE

## 2018-02-09 PROCEDURE — 99218 HC RM OBSERVATION: CPT

## 2018-02-09 PROCEDURE — 85025 COMPLETE CBC W/AUTO DIFF WBC: CPT | Performed by: INTERNAL MEDICINE

## 2018-02-09 PROCEDURE — 74011250637 HC RX REV CODE- 250/637: Performed by: INTERNAL MEDICINE

## 2018-02-09 PROCEDURE — 80048 BASIC METABOLIC PNL TOTAL CA: CPT | Performed by: INTERNAL MEDICINE

## 2018-02-09 RX ADMIN — Medication 5 ML: at 05:33

## 2018-02-09 RX ADMIN — CLOPIDOGREL BISULFATE 75 MG: 75 TABLET ORAL at 08:17

## 2018-02-09 NOTE — DISCHARGE INSTRUCTIONS
Resume metformin on 2/11/18                         Cardiac Catheterization/Angiography Discharge Instructions    *Check the puncture site frequently for swelling or bleeding. If you see any bleeding, lie down and apply pressure over the area with a clean town or washcloth. Notify your doctor for any redness, swelling, drainage or oozing from the puncture site. Notify your doctor for any fever or chills. *If the leg or arm with the puncture becomes cold, numb or painful, call [facility] Cardiology at  [phone]. *Activity should be limited for the next 48 hours. Climb stairs as little as possible and avoid any stooping, bending or strenuous activity for 48 hours. No heavy lifting (anything over 10 pounds) for three days. *Do not drive for 48 hours. *You may resume your usual diet. Drink more fluids than usual.    *Have a responsible person drive you home and stay with you for at least 24 hours after your heart catheterization/angiography. *You may remove the bandage from your Right and Arm in 24 hours. You may shower in 24 hours. No tub baths, hot tubs or swimming for one week. Do not place any lotions, creams, powders, ointments over the puncture site for one week. You may place a clean band-aid over the puncture site each day for 5 days. Change this daily. Percutaneous Coronary Intervention: What to Expect at [facility]    Percutaneous coronary intervention (PCI) is the name for procedures that are used to open a narrowed or blocked coronary artery. The two most common PCI procedures are coronary angioplasty and coronary stent placement. Your groin or arm may have a bruise and feel sore for a day or two after a percutaneous coronary intervention (PCI). You can do light activities around the house, but nothing strenuous for several days. This care sheet gives you a general idea about how long it will take for you to recover. But each person recovers at a different pace.  Follow the steps below to get better as quickly as possible. How can you care for yourself at home? Activity  ? · If the doctor gave you a sedative:  ¨ For 24 hours, don't do anything that requires attention to detail. It takes time for the medicine's effects to completely wear off. ¨ For your safety, do not drive or operate any machinery that could be dangerous. Wait until the medicine wears off and you can think clearly and react easily. ? · Do not do strenuous exercise and do not lift, pull, or push anything heavy until your doctor says it is okay. This may be for a day or two. You can walk around the house and do light activity, such as cooking. ? · If the catheter was placed in your groin, try not to walk up stairs for the first couple of days. ? · If the catheter was placed in your arm near your wrist, do not bend your wrist deeply for the first couple of days. Be careful using your hand to get into and out of a chair or bed. ? · Carry your stent identification card with you at all times. ? · If your doctor recommends it, get more exercise. Walking is a good choice. Bit by bit, increase the amount you walk every day. Try for at least 30 minutes on most days of the week. Diet  ? · Drink plenty of fluids to help your body flush out the dye. If you have kidney, heart, or liver disease and have to limit fluids, talk with your doctor before you increase the amount of fluids you drink. ? · Keep eating a heart-healthy diet that has lots of fruits, vegetables, and whole grains. If you have not been eating this way, talk to your doctor. You also may want to talk to a dietitian. This expert can help you to learn about healthy foods and plan meals. Medicines  ? · Your doctor will tell you if and when you can restart your medicines. He or she will also give you instructions about taking any new medicines.    ? · If you take blood thinners, such as warfarin (Coumadin), clopidogrel (Plavix), or aspirin, be sure to talk to your doctor. He or she will tell you if and when to start taking those medicines again. Make sure that you understand exactly what your doctor wants you to do.   ? · Your doctor will prescribe blood-thinning medicines. You will likely take aspirin plus another antiplatelet, such as clopidogrel (Plavix). It is very important that you take these medicines exactly as directed. These medicines help keep the coronary artery open and reduce your risk of a heart attack. ? · Call your doctor if you think you are having a problem with your medicine. ?Care of the catheter site  ? · For 1 or 2 days, keep a bandage over the spot where the catheter was inserted. The bandage probably will fall off in this time. ? · Put ice or a cold pack on the area for 10 to 20 minutes at a time to help with soreness or swelling. Put a thin cloth between the ice and your skin. ? · You may shower 24 to 48 hours after the procedure, if your doctor okays it. Pat the incision dry. ? · Do not soak the catheter site until it is healed. Don't take a bath for 1 week, or until your doctor tells you it isokay. Follow-up care is a key part of your treatment and safety. Be sure to make and go to all appointments, and call your doctor if you are having problems. It's also a good idea to know your test results and keep a list of the medicines you take. When should you call for help? Call 911 anytime you think you may need emergency care. For example, call if:  ? · You passed out (lost consciousness). ? · You have severe trouble breathing. ? · You have sudden chest pain and shortness of breath, or you cough up blood. ? · You have symptoms of a heart attack, such as:  ¨ Chest pain or pressure. ¨ Sweating. ¨ Shortness of breath. ¨ Nausea or vomiting. ¨ Pain that spreads from the chest to the neck, jaw, or one or both shoulders or arms. ¨ Dizziness or lightheadedness. ¨ A fast or uneven pulse.   After calling 911, chew 1 adult-strength aspirin. Wait for an ambulance. Do not try to drive yourself. ? · You have been diagnosed with angina, and you have angina symptoms that do not go away with rest or are not getting better within 5 minutes after you take one dose of nitroglycerin. ?Call your doctor now or seek immediate medical care if:  ? · You are bleeding from the area where the catheter was put in your artery. ? · You have a fast-growing, painful lump at the catheter site. ? · You have signs of infection, such as:  ¨ Increased pain, swelling, warmth, or redness. ¨ Red streaks leading from the catheter site. ¨ Pus draining from the catheter site. ¨ A fever. ? · Your leg or arm looks blue or feels cold, numb, or tingly. ? Watch closely for changes in your health, and be sure to contact your doctor if you have any problems. Where can you learn more? Go to http://lisa-guerda.info/. Enter Q183 in the search box to learn more about \"Percutaneous Coronary Intervention: What to Expect at Home. \"  Current as of: September 21, 2016  Content Version: 11.4  © 4151-7906 [employer]. Care instructions adapted under license by [employer] (which disclaims liability or warranty for this information). If you have questions about a medical condition or this instruction, always ask your healthcare professional. [place] [address] any warranty or liability for your use of this information. DISCHARGE SUMMARY from Nurse    PATIENT INSTRUCTIONS:    After general anesthesia or intravenous sedation, for 24 hours or while taking prescription Narcotics:  · Limit your activities  · Do not drive and operate hazardous machinery  · Do not make important personal or business decisions  · Do  not drink alcoholic beverages  · If you have not urinated within 8 hours after discharge, please contact your surgeon on call.     Report the following to your surgeon:  · Excessive pain, swelling, redness or odor of or around the surgical area  · Temperature over 100.5  · Nausea and vomiting lasting longer than 4 hours or if unable to take medications  · Any signs of decreased circulation or nerve impairment to extremity: change in color, persistent  numbness, tingling, coldness or increase pain  · Any questions    What to do at Home:The patient is being discharged home in stable condition on a low saturated fat, low cholesterol and low salt diet. The patient is instructed to advance activities as tolerated to the limit of fatigue or shortness of breath. The patient is instructed to avoid all heavy lifting for 3-5 days. The patient is instructed to watch the cath site for bleeding/oozing; if seen, the patient is instructed to apply firm pressure with a clean cloth and call [facility] Cardiology at [phone]. The patient is instructed to watch for signs of infection which include: increasing area of redness, fever/hot to touch or purulent drainage at the catheterization site. The patient is instructed not to soak in a bathtub for 7-10 days, but is cleared to shower. The patient is instructed to call the office or return to the ER for immediate evaluation for any shortness of breath or chest pain not relieved by NTG. *  Please give a list of your current medications to your Primary Care Provider. *  Please update this list whenever your medications are discontinued, doses are      changed, or new medications (including over-the-counter products) are added. *  Please carry medication information at all times in case of emergency situations. These are general instructions for a healthy lifestyle:    No smoking/ No tobacco products/ Avoid exposure to second hand smoke  Surgeon General's Warning:  Quitting smoking now greatly reduces serious risk to your health.     Obesity, smoking, and sedentary lifestyle greatly increases your risk for illness    A healthy diet, regular physical exercise & weight monitoring are important for maintaining a healthy lifestyle    You may be retaining fluid if you have a history of heart failure or if you experience any of the following symptoms:  Weight gain of 3 pounds or more overnight or 5 pounds in a week, increased swelling in our hands or feet or shortness of breath while lying flat in bed. Please call your doctor as soon as you notice any of these symptoms; do not wait until your next office visit. Recognize signs and symptoms of STROKE:    F-face looks uneven    A-arms unable to move or move unevenly    S-speech slurred or non-existent    T-time-call 911 as soon as signs and symptoms begin-DO NOT go       Back to bed or wait to see if you get better-TIME IS BRAIN. Warning Signs of HEART ATTACK     Call 911 if you have these symptoms:   Chest discomfort. Most heart attacks involve discomfort in the center of the chest that lasts more than a few minutes, or that goes away and comes back. It can feel like uncomfortable pressure, squeezing, fullness, or pain.  Discomfort in other areas of the upper body. Symptoms can include pain or discomfort in one or both arms, the back, neck, jaw, or stomach.  Shortness of breath with or without chest discomfort.  Other signs may include breaking out in a cold sweat, nausea, or lightheadedness. Don't wait more than five minutes to call 911 - MINUTES MATTER! Fast action can save your life. Calling 911 is almost always the fastest way to get lifesaving treatment. Emergency Medical Services staff can begin treatment when they arrive -- up to an hour sooner than if someone gets to the hospital by car. The discharge information has been reviewed with the patient. The patient verbalized understanding.   Discharge medications reviewed with the patient and appropriate educational materials and side effects teaching were provided.   ___________________________________________________________________________________________________________________________________

## 2018-02-09 NOTE — PROGRESS NOTES
Discharge instructions reviewed with jacinto. Prescriptions given for no new medicatoin and med info sheets provided for all new medications. Opportunity for questions provided. Patient voiced understanding of all discharge instructions. IVs removed by primary RN. Patient awaiting family for transport.

## 2018-02-09 NOTE — PROGRESS NOTES
Bedside and Verbal shift change report given to LILY Reese (oncoming nurse) by self (offgoing nurse). Report included the following information SBAR, Kardex, MAR and Recent Results.

## 2018-02-09 NOTE — DISCHARGE SUMMARY
[facility] Discharge Summary     Patient ID:  Sasha Lyons  [id number]  89 y.o.  1945    Admit date: 2/8/2018    Discharge date:  2/9/2018    Admitting Physician: Todd Figueroa MD     Discharge Physician: Dr. Beka Reynolds    Admission Diagnoses: Chest pain [R07.9]    Discharge Diagnoses:    Diagnosis    CAD (coronary artery disease)    DM2 (diabetes mellitus, type 2) ([address]    Hypertension    Hyperlipidemia    GERD (gastroesophageal reflux disease)       Cardiology Procedures this admission:  Left heart catheterization with PCI  Consults: None    Hospital Course: Patient was seen at the office of [facility] by Dr. Beka Reynolds for complaints of chest pain and was subsequently scheduled for an AM Admission [facility] on 2/8/2018. Patient underwent cardiac catheterization by Dr. Beka Reynolds. Patient was found to have 90% stenosis of the mid LAD that was stented with a 2.5 x 28-mm Synergy RIGOBERTO with 0% residual stenosis. Patient tolerated the procedure well and was taken to the telemetry floor for recovery. The morning of discharge, patient was up feeling well without any complaints of chest pain or shortness of breath. Patient's right radial cath site was clean, dry and intact without hematoma or bruit. Patient's labs were WNL. Patient was seen and examined by Dr. Beka Reynolds and determined stable and ready for discharge. Patient was instructed on the importance of medication compliance including taking aspirin and Plavix everyday without missing a dose. After receiving drug eluting stents, the patient will remain on dual anti-platelet therapy for 1 year. For maximized medical therapy for CAD, patient will continue ACE-I and statin as well. No BB due to documented bradycardia. The patient will follow up with [facility] -- Dr. Beka Reynolds on  2/22/18 at 945 am in [place] office. Patient has been referred to cardiac rehab.     DISPOSITION: The patient is being discharged home in stable condition on a low saturated fat, low cholesterol and low salt diet. The patient is instructed to advance activities as tolerated to the limit of fatigue or shortness of breath. The patient is instructed to avoid all heavy lifting for 3-5 days. The patient is instructed to watch the cath site for bleeding/oozing; if seen, the patient is instructed to apply firm pressure with a clean cloth and call [facility] at [phone]. The patient is instructed to watch for signs of infection which include: increasing area of redness, fever/hot to touch or purulent drainage at the catheterization site. The patient is instructed not to soak in a bathtub for 7-10 days, but is cleared to shower. The patient is instructed to call the office or return to the ER for immediate evaluation for any shortness of breath or chest pain not relieved by NTG. Discharge Exam: Patient has been seen by Dr. Maranda Werner: see his progress note for exam details.     Visit Vitals    /56 (BP 1 Location: Left arm, BP Patient Position: At rest)    Pulse (!) 59    Temp 98 °F (36.7 °C)    Resp 16    Ht 5' 6\" (1.676 m)    Wt 80.7 kg (178 lb)    SpO2 92%    Breastfeeding No    BMI 28.73 kg/m2       Recent Results (from the past 24 hour(s))   METABOLIC PANEL, BASIC    Collection Time: 02/08/18 10:26 AM   Result Value Ref Range    Sodium 141 136 - 145 mmol/L    Potassium 4.0 3.5 - 5.1 mmol/L    Chloride 105 98 - 107 mmol/L    CO2 26 21 - 32 mmol/L    Anion gap 10 7 - 16 mmol/L    Glucose 124 (H) 65 - 100 mg/dL    BUN 14 8 - 23 MG/DL    Creatinine 1.27 (H) 0.6 - 1.0 MG/DL    GFR est AA 53 (L) >60 ml/min/1.73m2    GFR est non-AA 44 (L) >60 ml/min/1.73m2    Calcium 9.4 8.3 - 10.4 MG/DL   EKG, 12 LEAD, INITIAL    Collection Time: 02/08/18  1:34 PM   Result Value Ref Range    Ventricular Rate 61 BPM    Atrial Rate 61 BPM    P-R Interval 226 ms    QRS Duration 90 ms    Q-T Interval 436 ms    QTC Calculation (Bezet) 438 ms Calculated P Axis 45 degrees    Calculated R Axis -55 degrees    Calculated T Axis 74 degrees    Diagnosis       Sinus rhythm with 1st degree A-V block  Left axis deviation  Anterior infarct (cited on or before 01-JUL-2009)  Nonspecific ST abnormality  When compared with ECG of 24-JUL-2009 07:32,  Criteria for Inferior infarct are no longer Present  T wave inversion no longer evident in Inferior leads  T wave inversion no longer evident in Anterolateral leads  Confirmed by Keron Roberts MD (), JULISSA LEE ([id number]) on 2/8/2018 4:59:58 PM     GLUCOSE, POC    Collection Time: 02/08/18  9:07 PM   Result Value Ref Range    Glucose (POC) 284 (H) 65 - 100 mg/dL         Patient Instructions:   Current Discharge Medication List      CONTINUE these medications which have NOT CHANGED    Details   MULTIVITAMIN WITH MINERALS (HAIR,SKIN AND NAILS PO) Take  by mouth daily. rosuvastatin (CRESTOR) 40 mg tablet TAKE 1 TAB BY MOUTH NIGHTLY. Qty: 90 Tab, Refills: 3      clopidogrel (PLAVIX) 75 mg tab Take 1 Tab by mouth daily. Qty: 90 Tab, Refills: 3      lisinopril (PRINIVIL, ZESTRIL) 10 mg tablet Take 10 mg by mouth daily. Indications: HYPERTENSION      aspirin delayed-release 81 mg tablet Take 81 mg by mouth nightly. METFORMIN HCL (METFORMIN PO) Take 500 mg by mouth two (2) times a day. Resume on 2/11/18      multivitamin with iron (DAILY MULTI-VITAMINS/IRON) tablet Take 1 Tab by mouth daily. DULOXETINE HCL (CYMBALTA PO) Take 60 mg by mouth nightly. Indications: neuropathy      VITAMIN B-12 IJ by Injection route every thirty (30) days. NEXIUM 40 mg capsule Take 40 mg by mouth Every morning (before breakfast). Indications: GERD      LYRICA PO Take 150 mg by mouth two (2) times a day. Indications: neuropathy      nitroglycerin (NITROSTAT) 0.4 mg SL tablet 1 Tab by SubLINGual route as needed for Chest Pain for up to 3 doses.   Qty: 25 Tab, Refills: 11      diphenhydrAMINE (BENADRYL) 25 mg capsule Take 50 mg by mouth nightly as needed. Indications:  Insomnia

## 2018-02-09 NOTE — PROGRESS NOTES
Pt refuses am meds and would like to take her medications when she gets home. Educated on importance of plavix and plavix was given- see MAR    RUE restrictions reviewed.  Pt verbalized understanding

## 2018-02-09 NOTE — PROGRESS NOTES
Care Management Interventions  PCP Verified by CM: Yes (last week )  Mode of Transport at Discharge: Other (see comment) (daughter)  Transition of Care Consult (CM Consult): Discharge Planning  Current Support Network: Lives Alone  Confirm Follow Up Transport: Other (see comment) (has Zainab mcwilliams with a lift and daughter or friend drives patient an drive but difficult. )  Plan discussed with Pt/Family/Caregiver: Yes  Freedom of Choice Offered: Yes  Discharge Location  Discharge Placement: Home  Met with patient for d/c planning. Patient alert and oriented x 3, independent of ADL's but does have an aide with [facility] 4 x /week to assist with bathing. Patient does not ambulate due to multiple knee surgeries. She has a motorized wheelchair and a lift van for transportation. Patient able to obtain her medications and has a daughter nearby that can assist as needed. Patient to be d/c home today with daughter coming to pick her up.

## 2018-02-09 NOTE — PROGRESS NOTES
Cardiac Rehab:  Spoke with patient regarding referral to cardiac rehab. Patient meets admission criteria based on PCI(date2/8/2018). Discussed lifestyle modifications to promote cardiac wellness. Patient indicated that she would not be able to come to [facility] on a regular basis due to transportation issues. Encouraged patient to discuss activity progression and lifestyle factors with her cardiologist.  Voices understanding.

## 2019-03-21 ENCOUNTER — HOSPITAL ENCOUNTER (OUTPATIENT)
Dept: LAB | Age: 74
Discharge: HOME | End: 2019-03-21
Payer: MEDICARE

## 2019-03-21 DIAGNOSIS — I25.118 ATHEROSCLEROSIS OF NATIVE CORONARY ARTERY OF NATIVE HEART WITH STABLE ANGINA PECTORIS (HCC): Chronic | ICD-10-CM

## 2019-03-21 LAB
ALBUMIN SERPL-MCNC: 3.5 G/DL (ref 3.2–4.6)
ALBUMIN/GLOB SERPL: 1 {RATIO}
ALP SERPL-CCNC: 62 U/L (ref 50–136)
ALT SERPL-CCNC: 23 U/L (ref 12–65)
ANION GAP SERPL CALC-SCNC: 7 MMOL/L
AST SERPL-CCNC: 21 U/L (ref 15–37)
BASOPHILS # BLD: 0.1 K/UL (ref 0–0.2)
BASOPHILS NFR BLD: 1 % (ref 0–2)
BILIRUB SERPL-MCNC: 0.4 MG/DL (ref 0.2–1.1)
BUN SERPL-MCNC: 13 MG/DL (ref 8–23)
CALCIUM SERPL-MCNC: 9.7 MG/DL (ref 8.3–10.4)
CHLORIDE SERPL-SCNC: 107 MMOL/L (ref 98–107)
CHOLEST SERPL-MCNC: 134 MG/DL
CO2 SERPL-SCNC: 31 MMOL/L (ref 21–32)
CREAT SERPL-MCNC: 1.1 MG/DL (ref 0.6–1)
DIFFERENTIAL METHOD BLD: ABNORMAL
EOSINOPHIL # BLD: 0.3 K/UL (ref 0–0.8)
EOSINOPHIL NFR BLD: 4 % (ref 0.5–7.8)
ERYTHROCYTE [DISTWIDTH] IN BLOOD BY AUTOMATED COUNT: 15.9 % (ref 11.9–14.6)
GLOBULIN SER CALC-MCNC: 3.6 G/DL
GLUCOSE SERPL-MCNC: 110 MG/DL (ref 65–100)
HCT VFR BLD AUTO: 30.5 % (ref 35.8–46.3)
HDLC SERPL-MCNC: 58 MG/DL (ref 40–60)
HDLC SERPL: 2.3 {RATIO}
HGB BLD-MCNC: 9.9 G/DL (ref 11.7–15.4)
IMM GRANULOCYTES # BLD AUTO: 0 K/UL (ref 0–0.5)
IMM GRANULOCYTES NFR BLD AUTO: 0 % (ref 0–5)
LDLC SERPL CALC-MCNC: 47.8 MG/DL
LIPID PROFILE,FLP: ABNORMAL
LYMPHOCYTES # BLD: 1.7 K/UL (ref 0.5–4.6)
LYMPHOCYTES NFR BLD: 30 % (ref 13–44)
MCH RBC QN AUTO: 28.8 PG (ref 26.1–32.9)
MCHC RBC AUTO-ENTMCNC: 32.5 G/DL (ref 31.4–35)
MCV RBC AUTO: 88.7 FL (ref 79.6–97.8)
MONOCYTES # BLD: 0.5 K/UL (ref 0.1–1.3)
MONOCYTES NFR BLD: 9 % (ref 4–12)
NEUTS SEG # BLD: 3.2 K/UL (ref 1.7–8.2)
NEUTS SEG NFR BLD: 56 % (ref 43–78)
NRBC # BLD: 0 K/UL (ref 0–0.2)
PLATELET # BLD AUTO: 345 K/UL (ref 150–450)
PMV BLD AUTO: 10.5 FL (ref 9.4–12.3)
POTASSIUM SERPL-SCNC: 4 MMOL/L (ref 3.5–5.1)
PROT SERPL-MCNC: 7.1 G/DL (ref 6.3–8.2)
RBC # BLD AUTO: 3.44 M/UL (ref 4.05–5.2)
SODIUM SERPL-SCNC: 145 MMOL/L (ref 136–145)
TRIGL SERPL-MCNC: 141 MG/DL (ref 35–150)
VLDLC SERPL CALC-MCNC: 28.2 MG/DL (ref 6–23)
WBC # BLD AUTO: 5.8 K/UL (ref 4.3–11.1)

## 2019-03-21 PROCEDURE — 80053 COMPREHEN METABOLIC PANEL: CPT

## 2019-03-21 PROCEDURE — 85025 COMPLETE CBC W/AUTO DIFF WBC: CPT

## 2019-03-21 PROCEDURE — 80061 LIPID PANEL: CPT

## 2019-03-21 PROCEDURE — 36415 COLL VENOUS BLD VENIPUNCTURE: CPT

## 2019-07-30 ENCOUNTER — HOSPITAL ENCOUNTER (OUTPATIENT)
Dept: LAB | Age: 74
Discharge: HOME | End: 2019-07-30
Payer: MEDICARE

## 2019-07-30 DIAGNOSIS — I25.119 ATHEROSCLEROSIS OF NATIVE CORONARY ARTERY OF NATIVE HEART WITH ANGINA PECTORIS (HCC): Chronic | ICD-10-CM

## 2019-07-30 LAB
ANION GAP SERPL CALC-SCNC: 8 MMOL/L (ref 7–16)
BASOPHILS # BLD: 0 K/UL (ref 0–0.2)
BASOPHILS NFR BLD: 1 % (ref 0–2)
BUN SERPL-MCNC: 18 MG/DL (ref 8–23)
CALCIUM SERPL-MCNC: 10 MG/DL (ref 8.3–10.4)
CHLORIDE SERPL-SCNC: 103 MMOL/L (ref 98–107)
CO2 SERPL-SCNC: 31 MMOL/L (ref 21–32)
CREAT SERPL-MCNC: 0.9 MG/DL (ref 0.6–1)
DIFFERENTIAL METHOD BLD: ABNORMAL
EOSINOPHIL # BLD: 0.1 K/UL (ref 0–0.8)
EOSINOPHIL NFR BLD: 3 % (ref 0.5–7.8)
ERYTHROCYTE [DISTWIDTH] IN BLOOD BY AUTOMATED COUNT: 17.7 % (ref 11.9–14.6)
GLUCOSE SERPL-MCNC: 97 MG/DL (ref 65–100)
HCT VFR BLD AUTO: 34.1 % (ref 35.8–46.3)
HGB BLD-MCNC: 11 G/DL (ref 11.7–15.4)
IMM GRANULOCYTES # BLD AUTO: 0 K/UL (ref 0–0.5)
IMM GRANULOCYTES NFR BLD AUTO: 0 % (ref 0–5)
LYMPHOCYTES # BLD: 2.6 K/UL (ref 0.5–4.6)
LYMPHOCYTES NFR BLD: 47 % (ref 13–44)
MCH RBC QN AUTO: 27.3 PG (ref 26.1–32.9)
MCHC RBC AUTO-ENTMCNC: 32.3 G/DL (ref 31.4–35)
MCV RBC AUTO: 84.6 FL (ref 79.6–97.8)
MONOCYTES # BLD: 0.6 K/UL (ref 0.1–1.3)
MONOCYTES NFR BLD: 11 % (ref 4–12)
NEUTS SEG # BLD: 2.1 K/UL (ref 1.7–8.2)
NEUTS SEG NFR BLD: 39 % (ref 43–78)
NRBC # BLD: 0 K/UL (ref 0–0.2)
PLATELET # BLD AUTO: 327 K/UL (ref 150–450)
PMV BLD AUTO: 10.5 FL (ref 9.4–12.3)
POTASSIUM SERPL-SCNC: 4.4 MMOL/L (ref 3.5–5.1)
RBC # BLD AUTO: 4.03 M/UL (ref 4.05–5.2)
SODIUM SERPL-SCNC: 142 MMOL/L (ref 136–145)
WBC # BLD AUTO: 5.4 K/UL (ref 4.3–11.1)

## 2019-07-30 PROCEDURE — 85025 COMPLETE CBC W/AUTO DIFF WBC: CPT

## 2019-07-30 PROCEDURE — 36415 COLL VENOUS BLD VENIPUNCTURE: CPT

## 2019-07-30 PROCEDURE — 80048 BASIC METABOLIC PNL TOTAL CA: CPT

## 2019-08-05 RX ORDER — POLYETHYLENE GLYCOL 3350 17 G/17G
17 POWDER, FOR SOLUTION ORAL DAILY
Status: ON HOLD | COMMUNITY
End: 2020-01-20 | Stop reason: SDUPTHER

## 2019-08-06 ENCOUNTER — HOSPITAL ENCOUNTER (OUTPATIENT)
Dept: CARDIAC CATH/INVASIVE PROCEDURES | Age: 74
Discharge: HOME | End: 2019-08-06
Attending: INTERNAL MEDICINE | Admitting: INTERNAL MEDICINE
Payer: MEDICARE

## 2019-08-06 VITALS
RESPIRATION RATE: 16 BRPM | WEIGHT: 176 LBS | OXYGEN SATURATION: 95 % | HEIGHT: 66 IN | SYSTOLIC BLOOD PRESSURE: 102 MMHG | DIASTOLIC BLOOD PRESSURE: 56 MMHG | BODY MASS INDEX: 28.28 KG/M2 | HEART RATE: 49 BPM

## 2019-08-06 LAB
ATRIAL RATE: 48 BPM
CALCULATED P AXIS, ECG09: 34 DEGREES
CALCULATED R AXIS, ECG10: -56 DEGREES
CALCULATED T AXIS, ECG11: 20 DEGREES
DIAGNOSIS, 93000: NORMAL
P-R INTERVAL, ECG05: 216 MS
Q-T INTERVAL, ECG07: 450 MS
QRS DURATION, ECG06: 90 MS
QTC CALCULATION (BEZET), ECG08: 402 MS
VENTRICULAR RATE, ECG03: 48 BPM

## 2019-08-06 PROCEDURE — 93458 L HRT ARTERY/VENTRICLE ANGIO: CPT

## 2019-08-06 PROCEDURE — C1769 GUIDE WIRE: HCPCS

## 2019-08-06 PROCEDURE — 74011250636 HC RX REV CODE- 250/636

## 2019-08-06 PROCEDURE — 93005 ELECTROCARDIOGRAM TRACING: CPT | Performed by: INTERNAL MEDICINE

## 2019-08-06 PROCEDURE — C1894 INTRO/SHEATH, NON-LASER: HCPCS

## 2019-08-06 PROCEDURE — 74011250636 HC RX REV CODE- 250/636: Performed by: INTERNAL MEDICINE

## 2019-08-06 PROCEDURE — 74011636320 HC RX REV CODE- 636/320: Performed by: INTERNAL MEDICINE

## 2019-08-06 PROCEDURE — 74011000250 HC RX REV CODE- 250: Performed by: INTERNAL MEDICINE

## 2019-08-06 PROCEDURE — 77030004534 HC CATH ANGI DX INFN CARD -A

## 2019-08-06 PROCEDURE — 77030015766

## 2019-08-06 PROCEDURE — 99152 MOD SED SAME PHYS/QHP 5/>YRS: CPT

## 2019-08-06 PROCEDURE — 77030019569 HC BND COMPR RAD TERU -B

## 2019-08-06 RX ORDER — SODIUM CHLORIDE 9 MG/ML
250 INJECTION, SOLUTION INTRAVENOUS CONTINUOUS
Status: DISCONTINUED | OUTPATIENT
Start: 2019-08-06 | End: 2019-08-06 | Stop reason: HOSPADM

## 2019-08-06 RX ORDER — FAMOTIDINE 10 MG/ML
20 INJECTION INTRAVENOUS AS NEEDED
Status: DISCONTINUED | OUTPATIENT
Start: 2019-08-06 | End: 2019-08-06 | Stop reason: HOSPADM

## 2019-08-06 RX ORDER — SODIUM CHLORIDE 0.9 % (FLUSH) 0.9 %
5-40 SYRINGE (ML) INJECTION EVERY 8 HOURS
Status: DISCONTINUED | OUTPATIENT
Start: 2019-08-06 | End: 2019-08-06 | Stop reason: HOSPADM

## 2019-08-06 RX ORDER — SODIUM CHLORIDE 0.9 % (FLUSH) 0.9 %
5-40 SYRINGE (ML) INJECTION AS NEEDED
Status: DISCONTINUED | OUTPATIENT
Start: 2019-08-06 | End: 2019-08-06 | Stop reason: HOSPADM

## 2019-08-06 RX ORDER — HYDROCORTISONE SODIUM SUCCINATE 100 MG/2ML
100 INJECTION, POWDER, FOR SOLUTION INTRAMUSCULAR; INTRAVENOUS ONCE
Status: COMPLETED | OUTPATIENT
Start: 2019-08-06 | End: 2019-08-06

## 2019-08-06 RX ORDER — SODIUM CHLORIDE 9 MG/ML
75 INJECTION, SOLUTION INTRAVENOUS CONTINUOUS
Status: DISCONTINUED | OUTPATIENT
Start: 2019-08-06 | End: 2019-08-06 | Stop reason: HOSPADM

## 2019-08-06 RX ORDER — SODIUM CHLORIDE 0.9 % (FLUSH) 0.9 %
5 SYRINGE (ML) INJECTION AS NEEDED
Status: DISCONTINUED | OUTPATIENT
Start: 2019-08-06 | End: 2019-08-06 | Stop reason: HOSPADM

## 2019-08-06 RX ORDER — HEPARIN SODIUM 200 [USP'U]/100ML
2 INJECTION, SOLUTION INTRAVENOUS CONTINUOUS
Status: DISCONTINUED | OUTPATIENT
Start: 2019-08-06 | End: 2019-08-06 | Stop reason: HOSPADM

## 2019-08-06 RX ORDER — DIPHENHYDRAMINE HYDROCHLORIDE 50 MG/ML
50 INJECTION, SOLUTION INTRAMUSCULAR; INTRAVENOUS ONCE
Status: COMPLETED | OUTPATIENT
Start: 2019-08-06 | End: 2019-08-06

## 2019-08-06 RX ORDER — LIDOCAINE HYDROCHLORIDE 10 MG/ML
2-20 INJECTION INFILTRATION; PERINEURAL
Status: DISCONTINUED | OUTPATIENT
Start: 2019-08-06 | End: 2019-08-06 | Stop reason: HOSPADM

## 2019-08-06 RX ORDER — MIDAZOLAM HYDROCHLORIDE 1 MG/ML
.5-2 INJECTION, SOLUTION INTRAMUSCULAR; INTRAVENOUS
Status: DISCONTINUED | OUTPATIENT
Start: 2019-08-06 | End: 2019-08-06 | Stop reason: HOSPADM

## 2019-08-06 RX ORDER — FENTANYL CITRATE 50 UG/ML
25-100 INJECTION, SOLUTION INTRAMUSCULAR; INTRAVENOUS
Status: DISCONTINUED | OUTPATIENT
Start: 2019-08-06 | End: 2019-08-06 | Stop reason: HOSPADM

## 2019-08-06 RX ORDER — GUAIFENESIN 100 MG/5ML
324 LIQUID (ML) ORAL ONCE
Status: DISCONTINUED | OUTPATIENT
Start: 2019-08-06 | End: 2019-08-06 | Stop reason: HOSPADM

## 2019-08-06 RX ADMIN — FAMOTIDINE 20 MG: 10 INJECTION, SOLUTION INTRAVENOUS at 07:27

## 2019-08-06 RX ADMIN — FENTANYL CITRATE 50 MCG: 50 INJECTION, SOLUTION INTRAMUSCULAR; INTRAVENOUS at 08:44

## 2019-08-06 RX ADMIN — HYDROCORTISONE SODIUM SUCCINATE 100 MG: 100 INJECTION, POWDER, FOR SOLUTION INTRAMUSCULAR; INTRAVENOUS at 07:27

## 2019-08-06 RX ADMIN — LIDOCAINE HYDROCHLORIDE 4 ML: 10 INJECTION, SOLUTION INFILTRATION; PERINEURAL at 08:44

## 2019-08-06 RX ADMIN — HEPARIN SODIUM 2 ML: 10000 INJECTION, SOLUTION INTRAVENOUS; SUBCUTANEOUS at 08:45

## 2019-08-06 RX ADMIN — IOPAMIDOL 60 ML: 755 INJECTION, SOLUTION INTRAVENOUS at 08:54

## 2019-08-06 RX ADMIN — DIPHENHYDRAMINE HYDROCHLORIDE 50 MG: 50 INJECTION, SOLUTION INTRAMUSCULAR; INTRAVENOUS at 07:27

## 2019-08-06 RX ADMIN — MIDAZOLAM HYDROCHLORIDE 2 MG: 1 INJECTION, SOLUTION INTRAMUSCULAR; INTRAVENOUS at 08:44

## 2019-08-06 RX ADMIN — SODIUM CHLORIDE 75 ML/HR: 900 INJECTION, SOLUTION INTRAVENOUS at 07:27

## 2019-08-06 NOTE — PROCEDURES
Brief Cardiac Procedure Note    Patient: Anai Naranjo MRN: [id number]  SSN: [id number]    YOB: 1945  Age: 68 y.o. Sex: female      Date of Procedure: 8/6/2019     Pre-procedure Diagnosis: Atypical Angina    Post-procedure Diagnosis: Coronary Artery Disease    Procedure: Left Heart Catheterization    Brief Description of Procedure: See note    Performed By: Maryann [place] MD     Assistants: None    Anesthesia: Moderate Sedation    Estimated Blood Loss: Less than 10 mL      Specimens: None    Implants: None    Findings:   LV:  EF 65%  LM:  NML  LAD:  Stent stent   LCx:  Small - NML  RCA:  17% ISR mid    Complications: None    Recommendations: Continue medical therapy.     Signed By: Maryann [place] MD     August 6, 2019

## 2019-08-06 NOTE — PROCEDURES
[facility]  CARDIAC CATH    Name:  Joni Pena  MR#:  [id number]  :  1945  ACCOUNT #:  [de-identified]  DATE OF SERVICE:  2019    PRIMARY CARDIOLOGIST:  Justina Chiang MD    PRIMARY CARE PHYSICIAN:  Leah Mancini MD    BRIEF HISTORY:  The patient was seen emergently in the office by Dr. Talon Gant with symptoms worrisome for progressive angina, referred for cardiac catheterization. PREOPERATIVE DIAGNOSIS:  Typical and atypical angina. POSTOPERATIVE DIAGNOSIS:  Noncardiac chest pain. PROCEDURES PERFORMED:  Bilateral selective coronary angiography and left ventriculography. SURGEON:  Juan Alberto Chiang MD    ASSISTANT:  None. COMPLICATIONS:  None. ANESTHESIA:  Conscious sedation. Start time 08:43, end time 08:55. IMPLANTS:  None. SPECIMENS:  None. ESTIMATED BLOOD LOSS:  Less than 5 mL. MEDICATIONS:  2 mg of Versed, 50 mcg of fentanyl. MONITORING RN:  Alfredo Gonzalez. PROCEDURE:  After informed consent, the patient was prepped and draped in the usual sterile fashion. Right wrist was infiltrated with lidocaine. Right radial artery was accessed. A 6-Chinese sheath was advanced. A 5-Chinese Tiger catheter was utilized for right coronary injection. A 5-Chinese JL4 catheter was utilized for left coronary injection. A 5-Chinese angled pigtail for left ventriculography. Isovue contrast utilized. FINDINGS:  1. Left ventricle:  Normal left ventricular size. Normal left ventricular systolic function. Ejection fraction is 55-60%. There is no significant mitral regurgitation. No aortic valve gradient. Left end-diastolic pressure is elevated at 22 mmHg. 2.  Left main:  Left main is large, mildly calcified, bifurcates into LAD and circumflex systems, appears angiographically normal.  3.  Left anterior descending coronary: It is a large vessel, has mild proximal calcium, is moderately tortuous.   Just beyond the first diagonal there is a stent.  The stent itself remains patent with 30% proximal stenosis. The area of severe in-stent restenosis treated with additional stenting remains widely patent. Beyond this, the LAD courses the apex disease-free. 4.  Left circumflex coronary: It is a relatively small vessel. There is a stent in the proximal portion that remains widely patent. There are two small obtuse marginals remain patent. 5.  Right coronary: It is a large anatomically dominant vessel. This has been previously stented. The stent has a 30% focal in-stent restenosis, but remains widely patent. The posterior descending and posterolateral branches are normal.    Successful hemostasis with pneumatic radial band. CONCLUSIONS:  1. Normal left ventricular systolic function with mildly elevated end-diastolic pressure. 2.  Stable-appearing atherosclerotic coronary disease with widely patent LAD, circumflex and right coronary stenting. RECOMMENDATIONS:  Considerations for noncardiac source of chest pain. Thank you for allowing us to participate in the care of this patient. Any questions or concerns, please feel free to contact me. Huy Mosley MD      [id number]  D:  08/06/2019 9:19  T:  08/06/2019 9:24  JOB #:  [id number]  CC:   MD Maryann [place] MD

## 2019-08-30 ENCOUNTER — HOSPITAL ENCOUNTER (OUTPATIENT)
Dept: MRI IMAGING | Age: 74
Discharge: HOME | End: 2019-08-30
Attending: PSYCHIATRY & NEUROLOGY
Payer: MEDICARE

## 2019-08-30 DIAGNOSIS — R41.3 MEMORY LOSS: ICD-10-CM

## 2019-08-30 PROCEDURE — 70551 MRI BRAIN STEM W/O DYE: CPT

## 2020-01-08 ENCOUNTER — HOSPITAL ENCOUNTER (INPATIENT)
Age: 75
LOS: 12 days | Discharge: NURSING, REHAB OR LONG-TERM CARE | DRG: 683 | End: 2020-01-20
Attending: EMERGENCY MEDICINE | Admitting: INTERNAL MEDICINE
Payer: MEDICARE

## 2020-01-08 DIAGNOSIS — N17.9 ACUTE RENAL FAILURE, UNSPECIFIED ACUTE RENAL FAILURE TYPE (HCC): Primary | ICD-10-CM

## 2020-01-08 DIAGNOSIS — N17.9 AKI (ACUTE KIDNEY INJURY) (HCC): ICD-10-CM

## 2020-01-08 DIAGNOSIS — E61.1 IRON DEFICIENCY: ICD-10-CM

## 2020-01-08 DIAGNOSIS — R74.8 ELEVATED LIVER ENZYMES: ICD-10-CM

## 2020-01-08 DIAGNOSIS — D64.9 ANEMIA, UNSPECIFIED TYPE: ICD-10-CM

## 2020-01-08 LAB
ALBUMIN SERPL-MCNC: 3.1 G/DL (ref 3.2–4.6)
ALBUMIN/GLOB SERPL: 0.8 {RATIO} (ref 1.2–3.5)
ALP SERPL-CCNC: 52 U/L (ref 50–136)
ALT SERPL-CCNC: 411 U/L (ref 12–65)
AMORPH CRY URNS QL MICRO: ABNORMAL
ANION GAP SERPL CALC-SCNC: 11 MMOL/L (ref 7–16)
AST SERPL-CCNC: 454 U/L (ref 15–37)
BACTERIA URNS QL MICRO: 0 /HPF
BASOPHILS # BLD: 0 K/UL (ref 0–0.2)
BASOPHILS NFR BLD: 1 % (ref 0–2)
BILIRUB SERPL-MCNC: 0.6 MG/DL (ref 0.2–1.1)
BUN SERPL-MCNC: 65 MG/DL (ref 8–23)
CALCIUM SERPL-MCNC: 9.2 MG/DL (ref 8.3–10.4)
CASTS URNS QL MICRO: 0 /LPF
CHLORIDE SERPL-SCNC: 109 MMOL/L (ref 98–107)
CK MB CFR SERPL CALC: ABNORMAL %
CK MB SERPL-MCNC: 84.9 NG/ML (ref 0.5–3.6)
CK SERPL-CCNC: ABNORMAL U/L (ref 21–215)
CO2 SERPL-SCNC: 20 MMOL/L (ref 21–32)
CREAT SERPL-MCNC: 6.78 MG/DL (ref 0.6–1)
CRYSTALS URNS QL MICRO: 0 /LPF
DIFFERENTIAL METHOD BLD: ABNORMAL
EOSINOPHIL # BLD: 0.1 K/UL (ref 0–0.8)
EOSINOPHIL NFR BLD: 2 % (ref 0.5–7.8)
EPI CELLS #/AREA URNS HPF: ABNORMAL /HPF
ERYTHROCYTE [DISTWIDTH] IN BLOOD BY AUTOMATED COUNT: 17.3 % (ref 11.9–14.6)
GLOBULIN SER CALC-MCNC: 4 G/DL (ref 2.3–3.5)
GLUCOSE SERPL-MCNC: 87 MG/DL (ref 65–100)
HCT VFR BLD AUTO: 27.8 % (ref 35.8–46.3)
HGB BLD-MCNC: 9.2 G/DL (ref 11.7–15.4)
IMM GRANULOCYTES # BLD AUTO: 0 K/UL (ref 0–0.5)
IMM GRANULOCYTES NFR BLD AUTO: 0 % (ref 0–5)
LYMPHOCYTES # BLD: 1.7 K/UL (ref 0.5–4.6)
LYMPHOCYTES NFR BLD: 27 % (ref 13–44)
MCH RBC QN AUTO: 29.6 PG (ref 26.1–32.9)
MCHC RBC AUTO-ENTMCNC: 33.1 G/DL (ref 31.4–35)
MCV RBC AUTO: 89.4 FL (ref 79.6–97.8)
MONOCYTES # BLD: 0.5 K/UL (ref 0.1–1.3)
MONOCYTES NFR BLD: 8 % (ref 4–12)
MUCOUS THREADS URNS QL MICRO: 0 /LPF
NEUTS SEG # BLD: 3.9 K/UL (ref 1.7–8.2)
NEUTS SEG NFR BLD: 63 % (ref 43–78)
NRBC # BLD: 0 K/UL (ref 0–0.2)
PLATELET # BLD AUTO: 245 K/UL (ref 150–450)
PMV BLD AUTO: 12.2 FL (ref 9.4–12.3)
POTASSIUM SERPL-SCNC: 3.9 MMOL/L (ref 3.5–5.1)
PROT SERPL-MCNC: 7.1 G/DL (ref 6.3–8.2)
RBC # BLD AUTO: 3.11 M/UL (ref 4.05–5.2)
RBC #/AREA URNS HPF: ABNORMAL /HPF
SODIUM SERPL-SCNC: 140 MMOL/L (ref 136–145)
WBC # BLD AUTO: 6.2 K/UL (ref 4.3–11.1)
WBC URNS QL MICRO: ABNORMAL /HPF

## 2020-01-08 PROCEDURE — 65270000029 HC RM PRIVATE

## 2020-01-08 PROCEDURE — 81015 MICROSCOPIC EXAM OF URINE: CPT

## 2020-01-08 PROCEDURE — 80053 COMPREHEN METABOLIC PANEL: CPT

## 2020-01-08 PROCEDURE — 99283 EMERGENCY DEPT VISIT LOW MDM: CPT | Performed by: PHYSICIAN ASSISTANT

## 2020-01-08 PROCEDURE — 82550 ASSAY OF CK (CPK): CPT

## 2020-01-08 PROCEDURE — 80074 ACUTE HEPATITIS PANEL: CPT

## 2020-01-08 PROCEDURE — 77030020263 HC SOL INJ SOD CL0.9% LFCR 1000ML

## 2020-01-08 PROCEDURE — 96360 HYDRATION IV INFUSION INIT: CPT | Performed by: PHYSICIAN ASSISTANT

## 2020-01-08 PROCEDURE — 74011250636 HC RX REV CODE- 250/636: Performed by: PHYSICIAN ASSISTANT

## 2020-01-08 PROCEDURE — 81003 URINALYSIS AUTO W/O SCOPE: CPT | Performed by: PHYSICIAN ASSISTANT

## 2020-01-08 PROCEDURE — 85025 COMPLETE CBC W/AUTO DIFF WBC: CPT

## 2020-01-08 RX ORDER — SODIUM CHLORIDE 0.9 % (FLUSH) 0.9 %
5-40 SYRINGE (ML) INJECTION EVERY 8 HOURS
Status: DISCONTINUED | OUTPATIENT
Start: 2020-01-08 | End: 2020-01-20 | Stop reason: HOSPADM

## 2020-01-08 RX ORDER — SODIUM CHLORIDE 9 MG/ML
125 INJECTION, SOLUTION INTRAVENOUS CONTINUOUS
Status: DISCONTINUED | OUTPATIENT
Start: 2020-01-08 | End: 2020-01-09

## 2020-01-08 RX ORDER — SODIUM CHLORIDE 0.9 % (FLUSH) 0.9 %
5-40 SYRINGE (ML) INJECTION AS NEEDED
Status: DISCONTINUED | OUTPATIENT
Start: 2020-01-08 | End: 2020-01-20 | Stop reason: HOSPADM

## 2020-01-08 RX ORDER — BISACODYL 5 MG
5 TABLET, DELAYED RELEASE (ENTERIC COATED) ORAL DAILY PRN
Status: DISCONTINUED | OUTPATIENT
Start: 2020-01-08 | End: 2020-01-20 | Stop reason: HOSPADM

## 2020-01-08 RX ORDER — INSULIN LISPRO 100 [IU]/ML
INJECTION, SOLUTION INTRAVENOUS; SUBCUTANEOUS
Status: DISCONTINUED | OUTPATIENT
Start: 2020-01-08 | End: 2020-01-20 | Stop reason: HOSPADM

## 2020-01-08 RX ORDER — ONDANSETRON 2 MG/ML
4 INJECTION INTRAMUSCULAR; INTRAVENOUS
Status: DISCONTINUED | OUTPATIENT
Start: 2020-01-08 | End: 2020-01-20 | Stop reason: HOSPADM

## 2020-01-08 RX ADMIN — SODIUM CHLORIDE 1000 ML: 900 INJECTION, SOLUTION INTRAVENOUS at 14:39

## 2020-01-08 RX ADMIN — Medication 10 ML: at 23:01

## 2020-01-08 NOTE — ED PROVIDER NOTES
To ER on the advice of primary care doctor for evaluation of kidney function. She states for the past several days she has felt fatigued. She was seen at primary care office yesterday labs drawn and was called to inform of abnormal labs this morning. She denies any distinct complaints other than fatigue and possibly some discomfort in the lower abdominal area. Patient has had decreased appetite but has been eating and drinking, no difficulty passing urine no constipation or diarrhea. She denies any fever. No back pain. She has not been on any recent antibiotics no recent medicine changes    The history is provided by the patient. Fatigue   This is a new problem. The current episode started more than 2 days ago. The problem has been gradually worsening. There was no focality noted. Pertinent negatives include no slurred speech, no speech difficulty and no agitation. There has been no fever. Pertinent negatives include no vomiting, no altered mental status and no nausea. Associated symptoms comments: Mild urinary frequency . Associated medical issues do not include trauma or dementia.         Past Medical History:   Diagnosis Date    Abnormal EKG 12/9/2015    CAD (coronary artery disease) 2009    MI & 4 stents    Chronic pain     left shoulder    Coagulation disorder (HCC)     anemia: pt reports after a surgery    Diabetes ([address] 2013    type 2; oral med    DJD (degenerative joint disease) 6/30/2009    DM2 (diabetes mellitus, type 2) ([address] 1/9/2015    Gastrointestinal disorder     GERD (gastroesophageal reflux disease)     controlled with med    Hypertension     well controlled with med    Nausea & vomiting     requests scopaolamine patch    Neuropathy     Other ill-defined conditions(799.89)     hyperlipidemia/dyslipidemia    Palpitations 12/9/2015    Unspecified adverse effect of anesthesia     pt reports she does not want nerve block with TSA; reports pain at injection site after previous block    Urinary tract infection 1/9/2015       Past Surgical History:   Procedure Laterality Date    CARDIAC SURG PROCEDURE UNLIST      stents x 4    HX APPENDECTOMY      hernia    HX GYN      hysterectomy    HX KNEE ARTHROSCOPY      total of 18 knee surgeries    HX ORTHOPAEDIC      reconstructive L knee    HX SHOULDER REPLACEMENT      right; rotator cuff x 2         Family History:   Problem Relation Age of Onset    Arrhythmia Mother     Heart Attack Father     Heart Disease Father     Coronary Artery Disease Other         PREMATURE        Social History     Socioeconomic History    Marital status:      Spouse name: Not on file    Number of children: Not on file    Years of education: Not on file    Highest education level: Not on file   Occupational History    Not on file   Social Needs    Financial resource strain: Not on file    Food insecurity:     Worry: Not on file     Inability: Not on file    Transportation needs:     Medical: Not on file     Non-medical: Not on file   Tobacco Use    Smoking status: Never Smoker    Smokeless tobacco: Never Used   Substance and Sexual Activity    Alcohol use: No     Comment: rare    Drug use: No    Sexual activity: Not on file   Lifestyle    Physical activity:     Days per week: Not on file     Minutes per session: Not on file    Stress: Not on file   Relationships    Social connections:     Talks on phone: Not on file     Gets together: Not on file     Attends Jainism service: Not on file     Active member of club or organization: Not on file     Attends meetings of clubs or organizations: Not on file     Relationship status: Not on file    Intimate partner violence:     Fear of current or ex partner: Not on file     Emotionally abused: Not on file     Physically abused: Not on file     Forced sexual activity: Not on file   Other Topics Concern    Not on file   Social History Narrative    Not on file         ALLERGIES: Iodinated contrast media; Iothalamate meglumine; Adhesive tape; Codeine; Demerol [meperidine]; Dilaudid [hydromorphone (bulk)]; and Oxycodone    Review of Systems   Constitutional: Positive for fatigue. Gastrointestinal: Negative for nausea and vomiting. Musculoskeletal:        Chronic knee problems    Neurological: Negative for speech difficulty. Psychiatric/Behavioral: Negative for agitation. All other systems reviewed and are negative. Vitals:    01/08/20 1237   BP: 128/80   Pulse: 76   Resp: 16   Temp: 98.1 °F (36.7 °C)   SpO2: 98%   Weight: 85.3 kg (188 lb)   Height: 5' 6\" (1.676 m)            Physical Exam  Vitals signs and nursing note reviewed. Constitutional:       General: She is not in acute distress. Appearance: Normal appearance. She is well-developed. She is not diaphoretic. HENT:      Head: Normocephalic and atraumatic. Nose: Nose normal.      Mouth/Throat:      Mouth: Mucous membranes are moist.   Eyes:      Pupils: Pupils are equal, round, and reactive to light. Neck:      Musculoskeletal: Normal range of motion and neck supple. Cardiovascular:      Rate and Rhythm: Normal rate and regular rhythm. Pulmonary:      Effort: Pulmonary effort is normal.      Breath sounds: Normal breath sounds. No wheezing or rhonchi. Abdominal:      General: Bowel sounds are normal.      Palpations: Abdomen is soft. Tenderness: There is no tenderness. Hernia: No hernia is present. Musculoskeletal: Normal range of motion. Comments: Choric bilateral knee djd, multiple surgeries, lower ext ankle edema not new per pt    Skin:     General: Skin is warm. Neurological:      Mental Status: She is alert and oriented to person, place, and time. MDM  Number of Diagnoses or Management Options  Diagnosis management comments: hgb 9.2  Bun 65 cr 6.7  Ck-mb 84  Urine + blood, no back or flank pain   Pt givne  1 liter ns  Discussed with DR. Tom Flowers and hospitalist for admission for acute renal failure       Amount and/or Complexity of Data Reviewed  Clinical lab tests: ordered and reviewed  Review and summarize past medical records: yes  Discuss the patient with other providers: yes    Risk of Complications, Morbidity, and/or Mortality  Presenting problems: moderate  Diagnostic procedures: moderate  Management options: high    Patient Progress  Patient progress: improved         Procedures

## 2020-01-08 NOTE — ED TRIAGE NOTES
Patient sent by PCP for \"problems with my kidneys\". Denies n/v/d, abdominal pain. Reports dysuria and frequency for several days. Denies blood in urine.

## 2020-01-08 NOTE — H&P
History and Physical    Patient: Tay Lucas MRN: [id number]  SSN: [id number]    YOB: 1945  Age: 76 y.o. Sex: female      Subjective:      Tay Lucas is a 76 y.o. female who came to ER because of abnormal lab test from her primary doctor's office. Patient has been feeling weak for the past 2 or 3 weeks. She went to see her primary doctor. Part of the work-up she was found to have markedly elevated creatinine and liver enzymes she was advised to come to emergency room for evaluation. Patient reports losing weight for the past months. She could not tell me how much she lost in terms of her weight but she knew that she must have lost weight. Appetite is poor. No fever. No shortness of breath. No swelling. She thinks her urine output is decreased as well. No joint pain. No skin rashes. She denies nausea vomiting. No abdominal pain. No blood in urine. No blood in her stool. No hematemesis. No hemoptysis. She also denies sinus symptoms. Dened headache. Patient denies taking over-the-counter medications long-term. No herbal tea. Denied acutely ill from acute illness. No chronic diarrhea. In the emergency room she was found to have significant elevation of creatinine, and liver enzymes. Hospitalist service is requested to admit the patient.       Past medical history significant for diabetes mellitus, CAD post stent placement hypertension, , hyperlipidemia,     Past Medical History:   Diagnosis Date    Abnormal EKG 12/9/2015    CAD (coronary artery disease) 2009    MI & 4 stents    Chronic pain     left shoulder    Coagulation disorder (HCC)     anemia: pt reports after a surgery    Diabetes ([facility] 2013    type 2; oral med    DJD (degenerative joint disease) 6/30/2009    DM2 (diabetes mellitus, type 2) ([facility] 1/9/2015    Gastrointestinal disorder     GERD (gastroesophageal reflux disease)     controlled with med    Hypertension well controlled with med    Nausea & vomiting     requests scopaolamine patch    Neuropathy     Other ill-defined conditions(335.56)     hyperlipidemia/dyslipidemia    Palpitations 12/9/2015    Unspecified adverse effect of anesthesia     pt reports she does not want nerve block with TSA; reports pain at injection site after previous block    Urinary tract infection 1/9/2015     Past Surgical History:   Procedure Laterality Date    CARDIAC SURG PROCEDURE UNLIST      stents x 4    HX APPENDECTOMY      hernia    HX GYN      hysterectomy    HX KNEE ARTHROSCOPY      total of 18 knee surgeries    HX ORTHOPAEDIC      reconstructive L knee    HX SHOULDER REPLACEMENT      right; rotator cuff x 2      Family History   Problem Relation Age of Onset    Arrhythmia Mother     Heart Attack Father     Heart Disease Father     Coronary Artery Disease Other         PREMATURE      Social History     Tobacco Use    Smoking status: Never Smoker    Smokeless tobacco: Never Used   Substance Use Topics    Alcohol use: No     Comment: rare      Prior to Admission medications    Medication Sig Start Date End Date Taking? Authorizing Provider   rosuvastatin (CRESTOR) 40 mg tablet TAKE 1 TAB BY MOUTH NIGHTLY. 11/13/19   Cuco Gold MD   clopidogrel (PLAVIX) 75 mg tab Take 1 Tab by mouth daily. 10/15/19   Cuco Gold MD   polyethylene glycol (MIRALAX) 17 gram/dose powder Take 17 g by mouth daily. Provider, Historical   nitroglycerin (NITROSTAT) 0.4 mg SL tablet 1 Tab by SubLINGual route as needed for Chest Pain for up to 3 doses. 7/25/19   Cuco Gold MD   lisinopril (PRINIVIL, ZESTRIL) 10 mg tablet Take 10 mg by mouth daily. Indications: HYPERTENSION    Provider, Historical   aspirin delayed-release 81 mg tablet Take 81 mg by mouth nightly. Provider, Historical   METFORMIN HCL (METFORMIN PO) Take 500 mg by mouth two (2) times a day.     Provider, Historical   multivitamin with iron (DAILY MULTI-VITAMINS/IRON) tablet Take 1 Tab by mouth daily. Provider, Historical   DULOXETINE HCL (CYMBALTA PO) Take 60 mg by mouth nightly. Indications: neuropathy    Other, MD Marychuy   VITAMIN B-12 IJ by Injection route every thirty (30) days. Provider, Historical   NEXIUM 40 mg capsule Take 40 mg by mouth Every morning (before breakfast). Indications: GERD    Claudia, MD Marychuy   LYRICA PO Take 150 mg by mouth two (2) times a day. Indications: neuropathy    Claudia, MD Marychuy        Allergies   Allergen Reactions    Iodinated Contrast Media Anaphylaxis    Iothalamate Meglumine Anaphylaxis     Allergic to conray ivp dye    Adhesive Tape Other (comments)     Blisters - tolerates newer tapes without problems    Codeine Itching    Demerol [Meperidine] Swelling    Dilaudid [Hydromorphone (Bulk)] Other (comments)     hallucinate    Oxycodone Other (comments)     halucinations-- patient states it was oxycontin, not oxycodone       Review of Systems:    Constitutional: Negative for chills and fever. HENT: Negative for rhinorrhea and sore throat. Eyes: Negative for pain, redness and visual disturbance. Respiratory: Negative for chest tightness, shortness of breath and wheezing. Cardiovascular: Negative for chest pain and leg swelling. Gastrointestinal: Negative for abdominal pain, bowel incontinence, diarrhea, nausea and vomiting. Genitourinary: Negative for bladder incontinence, dysuria and hematuria. Musculoskeletal: Negative for back pain, gait problem, neck pain and neck stiffness. Skin: Negative for color change and rash. Neurological: negative for speech difficulty. Negative for focal weakness, weakness, numbness, headaches and loss of balance. Psychiatric/Behavioral: Negative for agitation, confusion and memory loss.       Objective:     Vitals:    01/08/20 1237   BP: 128/80   Pulse: 76   Resp: 16   Temp: 98.1 °F (36.7 °C)   SpO2: 98%   Weight: 85.3 kg (188 lb)   Height: 5' 6\" (1.676 m) Physical Exam:    General:                    The patient is a pleasant female in no acute distress. Head:                                   Normocephalic/atraumatic. Eyes:                                   palpebral pallor, no scleral icterus. ENT:                                    External auricular and nasal exam within normal limits. Mucous membranes are moist.  Neck:                                   Supple, non-tender, no JVD. Lungs:                       Clear to auscultation bilaterally without wheezes or crackles. No respiratory distress or accessory muscle use. Heart:                                  Regular rate and rhythm, without murmurs, rubs, or gallops. Abdomen:                  Soft, non-tender, non-distended with normoactive bowel sounds. Genitourinary:           No tenderness over the bladder or bilateral CVAs. Extremities:               Without clubbing, cyanosis, or edema. Skin:                                    Normal color, texture, and turgor. No rashes, lesions, or jaundice. Pulses:                      Radial and dorsalis pedis pulses present 2+ bilaterally. Capillary refill <2s. Neurologic:                CN II-XII grossly intact and symmetrical.                                               Moving all four extremities well with no focal deficits. Psychiatric:                Pleasant demeanor, appropriate affect.  Alert and oriented x 3    Lab and data    Recent Results (from the past 24 hour(s))   CBC WITH AUTOMATED DIFF    Collection Time: 01/08/20  2:03 PM   Result Value Ref Range    WBC 6.2 4.3 - 11.1 K/uL    RBC 3.11 (L) 4.05 - 5.2 M/uL    HGB 9.2 (L) 11.7 - 15.4 g/dL    HCT 27.8 (L) 35.8 - 46.3 %    MCV 89.4 79.6 - 97.8 FL    MCH 29.6 26.1 - 32.9 PG    MCHC 33.1 31.4 - 35.0 g/dL    RDW 17.3 (H) 11.9 - 14.6 %    PLATELET 768 634 - 450 K/uL    MPV 12.2 9.4 - 12.3 FL    ABSOLUTE NRBC 0.00 0.0 - 0.2 K/uL    DF AUTOMATED      NEUTROPHILS 63 43 - 78 %    LYMPHOCYTES 27 13 - 44 %    MONOCYTES 8 4.0 - 12.0 %    EOSINOPHILS 2 0.5 - 7.8 %    BASOPHILS 1 0.0 - 2.0 %    IMMATURE GRANULOCYTES 0 0.0 - 5.0 %    ABS. NEUTROPHILS 3.9 1.7 - 8.2 K/UL    ABS. LYMPHOCYTES 1.7 0.5 - 4.6 K/UL    ABS. MONOCYTES 0.5 0.1 - 1.3 K/UL    ABS. EOSINOPHILS 0.1 0.0 - 0.8 K/UL    ABS. BASOPHILS 0.0 0.0 - 0.2 K/UL    ABS. IMM. GRANS. 0.0 0.0 - 0.5 K/UL   METABOLIC PANEL, COMPREHENSIVE    Collection Time: 01/08/20  2:03 PM   Result Value Ref Range    Sodium 140 136 - 145 mmol/L    Potassium 3.9 3.5 - 5.1 mmol/L    Chloride 109 (H) 98 - 107 mmol/L    CO2 20 (L) 21 - 32 mmol/L    Anion gap 11 7 - 16 mmol/L    Glucose 87 65 - 100 mg/dL    BUN 65 (H) 8 - 23 MG/DL    Creatinine 6.78 (H) 0.6 - 1.0 MG/DL    GFR est AA 8 (L) >60 ml/min/1.73m2    GFR est non-AA 6 (L) >60 ml/min/1.73m2    Calcium 9.2 8.3 - 10.4 MG/DL    Bilirubin, total 0.6 0.2 - 1.1 MG/DL    ALT (SGPT) 411 (H) 12 - 65 U/L    AST (SGOT) 454 (H) 15 - 37 U/L    Alk.  phosphatase 52 50 - 136 U/L    Protein, total 7.1 6.3 - 8.2 g/dL    Albumin 3.1 (L) 3.2 - 4.6 g/dL    Globulin 4.0 (H) 2.3 - 3.5 g/dL    A-G Ratio 0.8 (L) 1.2 - 3.5     CK WITH MB    Collection Time: 01/08/20  2:03 PM   Result Value Ref Range    CK >14,000 (H) 21 - 215 U/L    CK - MB 84.9 (H) 0.5 - 3.6 ng/ml    CK-MB Index Cannot be calculated <2.5 %           Assessment:     Hospital Problems  Date Reviewed: 7/30/2019          Codes Class Noted POA    * (Principal) DEANNA (acute kidney injury) ([facility] ICD-10-CM: N17.9  ICD-9-CM: 584.9  1/8/2020 Unknown        Elevated liver enzymes ICD-10-CM: R74.8  ICD-9-CM: 790.5  1/8/2020 Unknown        Anemia ICD-10-CM: D64.9  ICD-9-CM: 285.9  1/10/2015 Yes        DM2 (diabetes mellitus, type 2) ([facility] ICD-10-CM: E11.9  ICD-9-CM: 250.00  1/9/2015 Yes        Coronary atherosclerosis of native coronary artery (Chronic) ICD-10-CM: I25.10  ICD-9-CM: 414.01  7/23/2009 Yes    Overview Addendum 2/8/2018  1:13 PM by Nick Arredondo MD     7/2009 - PCI of the RCA 3.5x33 and 3.5x18 Cypher  7/2009 - PCI of the LAD 2.5x18 Cypher, LCX 2.75x13 Cypher with kissing POBA of small  OM1  06/2013:  Stress MPI with no ischemia and small inferior apical fixed defect in patient with prior IMI  06/2013:  Normal echo  04/2014:  Stable CAD and FFR LAD 0.88  01/2018:  PCI mLAD for ISR - 2.5x28 Synergy                 Hypertension (Chronic) ICD-10-CM: I10  ICD-9-CM: 401.9  6/30/2009 Yes        Hyperlipidemia (Chronic) ICD-10-CM: E78.5  ICD-9-CM: 272.4  6/30/2009 Yes              Plan:     Acute kidney injury  Unclear etiology at this time  In the presence of elevated liver enzymes  Suspicious for hepatitis related kidney failure  We will check acute hepatitis profile  Hydrate the patient for now  May need to do more serology testing, including autoimmune diseases. Will check kidney ultrasound  May need kidney biopsy  Will ask nephrology to see her    Diabetes mellitus type 2  Monitor blood sugar. Cover with insulin sliding scale accordingly. We will take her off metformin for now due to her recent poor kidney function. Hypertension  Monitor blood pressure and manage accordingly. Avoid ACE inhibitor for now. Hyperlipidemia  Avoid statin for now due to hepatitis. We will hold aspirin, or Plavix for now in case she needs to have kidney biopsy. Patient requires hospital stay as an in-patient and anticipated stay is more than 2 midnights due to the serious nature of the illness. Healthcare power of  is daughter, Gabriel Powers. I have discussed with patient regarding advance directive. Patient would like to have a DNR status. Patient has no pain now. Will monitor. Further treatments will depend on initial responses and findings.      DVT prophylaxis : SCD         Signed By: Sonja June MD     January 8, 2020

## 2020-01-09 ENCOUNTER — APPOINTMENT (OUTPATIENT)
Dept: ULTRASOUND IMAGING | Age: 75
DRG: 683 | End: 2020-01-09
Attending: INTERNAL MEDICINE
Payer: MEDICARE

## 2020-01-09 PROBLEM — M62.82 NON-TRAUMATIC RHABDOMYOLYSIS: Status: ACTIVE | Noted: 2020-01-09

## 2020-01-09 LAB
ALBUMIN SERPL-MCNC: 2.4 G/DL (ref 3.2–4.6)
ALBUMIN/GLOB SERPL: 0.7 {RATIO} (ref 1.2–3.5)
ALP SERPL-CCNC: 43 U/L (ref 50–136)
ALT SERPL-CCNC: 323 U/L (ref 12–65)
ANION GAP SERPL CALC-SCNC: 12 MMOL/L (ref 7–16)
AST SERPL-CCNC: 337 U/L (ref 15–37)
BASOPHILS # BLD: 0 K/UL (ref 0–0.2)
BASOPHILS NFR BLD: 1 % (ref 0–2)
BILIRUB SERPL-MCNC: 0.5 MG/DL (ref 0.2–1.1)
BUN SERPL-MCNC: 68 MG/DL (ref 8–23)
CALCIUM SERPL-MCNC: 8.6 MG/DL (ref 8.3–10.4)
CHLORIDE SERPL-SCNC: 114 MMOL/L (ref 98–107)
CO2 SERPL-SCNC: 17 MMOL/L (ref 21–32)
CREAT SERPL-MCNC: 6.92 MG/DL (ref 0.6–1)
DIFFERENTIAL METHOD BLD: ABNORMAL
EOSINOPHIL # BLD: 0.2 K/UL (ref 0–0.8)
EOSINOPHIL NFR BLD: 3 % (ref 0.5–7.8)
ERYTHROCYTE [DISTWIDTH] IN BLOOD BY AUTOMATED COUNT: 17.5 % (ref 11.9–14.6)
GLOBULIN SER CALC-MCNC: 3.5 G/DL (ref 2.3–3.5)
GLUCOSE BLD STRIP.AUTO-MCNC: 158 MG/DL (ref 65–100)
GLUCOSE BLD STRIP.AUTO-MCNC: 178 MG/DL (ref 65–100)
GLUCOSE BLD STRIP.AUTO-MCNC: 53 MG/DL (ref 65–100)
GLUCOSE BLD STRIP.AUTO-MCNC: 72 MG/DL (ref 65–100)
GLUCOSE BLD STRIP.AUTO-MCNC: 75 MG/DL (ref 65–100)
GLUCOSE BLD STRIP.AUTO-MCNC: 82 MG/DL (ref 65–100)
GLUCOSE SERPL-MCNC: 87 MG/DL (ref 65–100)
HAV IGM SERPL QL IA: NEGATIVE
HBV CORE IGM SERPL QL IA: NEGATIVE
HBV SURFACE AG SERPL QL IA: NEGATIVE
HCT VFR BLD AUTO: 25.7 % (ref 35.8–46.3)
HCV AB S/CO SERPL IA: 0.1 S/CO RATIO (ref 0–0.9)
HGB BLD-MCNC: 8.4 G/DL (ref 11.7–15.4)
IMM GRANULOCYTES # BLD AUTO: 0 K/UL (ref 0–0.5)
IMM GRANULOCYTES NFR BLD AUTO: 0 % (ref 0–5)
KAPPA LC FREE SER-MCNC: 131.36 MG/L (ref 3.3–19.4)
KAPPA LC FREE/LAMBDA FREE SER: 2.66 {RATIO} (ref 0.26–1.65)
LAMBDA LC FREE SERPL-MCNC: 49.31 MG/L (ref 5.71–26.3)
LYMPHOCYTES # BLD: 1.7 K/UL (ref 0.5–4.6)
LYMPHOCYTES NFR BLD: 33 % (ref 13–44)
MCH RBC QN AUTO: 30.1 PG (ref 26.1–32.9)
MCHC RBC AUTO-ENTMCNC: 32.7 G/DL (ref 31.4–35)
MCV RBC AUTO: 92.1 FL (ref 79.6–97.8)
MONOCYTES # BLD: 0.5 K/UL (ref 0.1–1.3)
MONOCYTES NFR BLD: 10 % (ref 4–12)
NEUTS SEG # BLD: 2.8 K/UL (ref 1.7–8.2)
NEUTS SEG NFR BLD: 54 % (ref 43–78)
NRBC # BLD: 0 K/UL (ref 0–0.2)
PLATELET # BLD AUTO: 218 K/UL (ref 150–450)
PMV BLD AUTO: 12.5 FL (ref 9.4–12.3)
POTASSIUM SERPL-SCNC: 3.7 MMOL/L (ref 3.5–5.1)
PROT SERPL-MCNC: 5.9 G/DL (ref 6.3–8.2)
RBC # BLD AUTO: 2.79 M/UL (ref 4.05–5.2)
SODIUM SERPL-SCNC: 143 MMOL/L (ref 136–145)
WBC # BLD AUTO: 5.1 K/UL (ref 4.3–11.1)

## 2020-01-09 PROCEDURE — 86334 IMMUNOFIX E-PHORESIS SERUM: CPT

## 2020-01-09 PROCEDURE — 83883 ASSAY NEPHELOMETRY NOT SPEC: CPT

## 2020-01-09 PROCEDURE — 80053 COMPREHEN METABOLIC PANEL: CPT

## 2020-01-09 PROCEDURE — 84300 ASSAY OF URINE SODIUM: CPT

## 2020-01-09 PROCEDURE — 65270000029 HC RM PRIVATE

## 2020-01-09 PROCEDURE — 82962 GLUCOSE BLOOD TEST: CPT

## 2020-01-09 PROCEDURE — 77030020263 HC SOL INJ SOD CL0.9% LFCR 1000ML

## 2020-01-09 PROCEDURE — 76775 US EXAM ABDO BACK WALL LIM: CPT

## 2020-01-09 PROCEDURE — 36415 COLL VENOUS BLD VENIPUNCTURE: CPT

## 2020-01-09 PROCEDURE — 76937 US GUIDE VASCULAR ACCESS: CPT

## 2020-01-09 PROCEDURE — 84165 PROTEIN E-PHORESIS SERUM: CPT

## 2020-01-09 PROCEDURE — 85025 COMPLETE CBC W/AUTO DIFF WBC: CPT

## 2020-01-09 PROCEDURE — 74011250636 HC RX REV CODE- 250/636: Performed by: INTERNAL MEDICINE

## 2020-01-09 PROCEDURE — 84156 ASSAY OF PROTEIN URINE: CPT

## 2020-01-09 PROCEDURE — 81001 URINALYSIS AUTO W/SCOPE: CPT

## 2020-01-09 PROCEDURE — 74011636637 HC RX REV CODE- 636/637: Performed by: INTERNAL MEDICINE

## 2020-01-09 PROCEDURE — 74011000250 HC RX REV CODE- 250: Performed by: INTERNAL MEDICINE

## 2020-01-09 RX ORDER — DEXTROSE MONOHYDRATE 50 MG/ML
125 INJECTION, SOLUTION INTRAVENOUS CONTINUOUS
Status: DISCONTINUED | OUTPATIENT
Start: 2020-01-09 | End: 2020-01-09

## 2020-01-09 RX ADMIN — SODIUM BICARBONATE: 84 INJECTION, SOLUTION INTRAVENOUS at 12:23

## 2020-01-09 RX ADMIN — Medication 10 ML: at 13:23

## 2020-01-09 RX ADMIN — Medication 10 ML: at 05:13

## 2020-01-09 RX ADMIN — Medication 10 ML: at 22:00

## 2020-01-09 RX ADMIN — INSULIN LISPRO 2 UNITS: 100 INJECTION, SOLUTION INTRAVENOUS; SUBCUTANEOUS at 16:54

## 2020-01-09 NOTE — PROGRESS NOTES
01/08/20 2210   Dual Skin Pressure Injury Assessment   Dual Skin Pressure Injury Assessment WDL   Second Care Provider (Based on [address] [place] [address]) Amina Shipman RN        01/08/20 2210   Skin Integumentary   Skin Integumentary (WDL) WDL   Skin Condition/Temp Warm;Other (comment)  (cool extremeties)   Skin Color Appropriate for ethnicity; Other (comment)  (blue/purple to extremeties)   Skin Integrity Scars (comment); Tattoos (comment)  (scars to bilateral knees, tattoo R ankle)

## 2020-01-09 NOTE — CONSULTS
Nephrology consult    Admission Date:  1/8/2020    Admission Diagnosis  DEANNA (acute kidney injury) ([facility] [N17.9]    We are asked by Dr. Jasbir Reyes    History of Present Illness: Ms. Mala Staton is a 76 y.o with PMH significant for CAD, DM type II, DJD, DM type 2, HTN and palpitations admitted by direction of PCP 2/2 abnomal labs and pt complaints of weakness and no intentional weight loss past few months. Found with elevated renal function and LFTs we are consulted for DEANNA. From a renal standpoint her creatinine on admission was 6.72->6.92 baseline Cr 0.9-1.1, CK >46333, ->323, ->337, UA and renal US pending, no recent contrast exposure, no evidence of hypotension, home meds significant for ACEi, metformin, cybalta, and rosuvastatin. Patient seen and examined on rounds patient reports lives with her daughter uses electric chair for mobility support and walks some, weakness and unintentional weight loss over the last 3 months with decrease in appetite, reports good UOP with no urinary hesitancy, urgency, frequency, hematuria or flank pain, she denies muscle soreness, syncope, dizziness, or recent medication changes. Reports blood sugars well controlled in the 80s- to low 110s. No uremic symptoms.      Past Medical History:   Diagnosis Date    Abnormal EKG 12/9/2015    CAD (coronary artery disease) 2009    MI & 4 stents    Chronic pain     left shoulder    Coagulation disorder (HCC)     anemia: pt reports after a surgery    Diabetes ([facility] 2013    type 2; oral med    DJD (degenerative joint disease) 6/30/2009    DM2 (diabetes mellitus, type 2) ([facility] 1/9/2015    Gastrointestinal disorder     GERD (gastroesophageal reflux disease)     controlled with med    Hypertension     well controlled with med    Nausea & vomiting     requests scopaolamine patch    Neuropathy     Other ill-defined conditions(799.89)     hyperlipidemia/dyslipidemia    Palpitations 12/9/2015    Unspecified adverse effect of anesthesia     pt reports she does not want nerve block with TSA; reports pain at injection site after previous block    Urinary tract infection 1/9/2015      Past Surgical History:   Procedure Laterality Date    CARDIAC SURG PROCEDURE UNLIST      stents x 4    HX APPENDECTOMY      hernia    HX GYN      hysterectomy    HX KNEE ARTHROSCOPY      total of 18 knee surgeries    HX ORTHOPAEDIC      reconstructive L knee    HX SHOULDER REPLACEMENT      right; rotator cuff x 2      Current Facility-Administered Medications   Medication Dose Route Frequency    sodium chloride (NS) flush 5-40 mL  5-40 mL IntraVENous PRN    sodium chloride (NS) flush 5-40 mL  5-40 mL IntraVENous Q8H    sodium chloride (NS) flush 5-40 mL  5-40 mL IntraVENous PRN    ondansetron (ZOFRAN) injection 4 mg  4 mg IntraVENous Q6H PRN    bisacodyl (DULCOLAX) tablet 5 mg  5 mg Oral DAILY PRN    0.9% sodium chloride infusion  50 mL/hr IntraVENous CONTINUOUS    insulin lispro (HUMALOG) injection   SubCUTAneous AC&HS     Allergies   Allergen Reactions    Iodinated Contrast Media Anaphylaxis    Iothalamate Meglumine Anaphylaxis     Allergic to conray ivp dye    Adhesive Tape Other (comments)     Blisters - tolerates newer tapes without problems    Codeine Itching    Demerol [Meperidine] Swelling    Dilaudid [Hydromorphone (Bulk)] Other (comments)     hallucinate    Oxycodone Other (comments)     halucinations-- patient states it was oxycontin, not oxycodone      Social History     Tobacco Use    Smoking status: Never Smoker    Smokeless tobacco: Never Used   Substance Use Topics    Alcohol use: No     Comment: rare      Family History   Problem Relation Age of Onset    Arrhythmia Mother     Heart Attack Father     Heart Disease Father     Coronary Artery Disease Other         PREMATURE         Review of Systems  Gen - no fever, no chills, appetite okay  HEENT - no sore throat, no decreased vision, no hearing loss  Neck - no neck mass  CV - no chest pain, no palpitation, no orthopnea  Lung - no shortness of breath, no cough, no hemoptysis  Abd - no tenderness, no nausea/vomiting, no bloody stool  Ext - no edema, no clubbing, no cyanosis  Musculoskeletal - no joint pain, no back pain  Neurologic - no headaches, no dizziness, no seizures  Psychiatric - no anxiety, no depression  Skin - no rashes, no pupura  Genitourinary - no decreased urine output, no hematuria, no foamy urine    Objective:     Vitals:    01/08/20 2210 01/08/20 2357 01/09/20 0442 01/09/20 0644   BP: 150/78 123/61 107/65 113/69   Pulse: 98 60 60 62   Resp: 18 18 18 18   Temp: 97.4 °F (36.3 °C) 97.5 °F (36.4 °C) 97.6 °F (36.4 °C) 97.8 °F (36.6 °C)   SpO2: 96% 98% 99% 98%   Weight:       Height:         No intake or output data in the 24 hours ending 01/09/20 0810    Physical Exam  GEN :in no distress, alert and oriented  HEENT: anicteric sclerae,  Mucous membranes are moist.  Neck - supple without JVD,   No lymphadenopathy. CV - regular rate and rhythm, no murmur, no rub  Lung - clear bilaterally, lungs expand symmetrically  Abd - soft, nontender, bowel sounds present, no hepatosplenomegaly  Ext - no clubbing, no cyanosis, trace edema  Neurologic - nonfocal, no asterixis  Genitourinary - bladder nonpalpable  Skin - no rashes, no purpura, no ecchymoses  Psychiatric: Normal mood and affect. Data Review:   Recent Labs     01/09/20  0537 01/08/20  1403   WBC 5.1 6.2   HGB 8.4* 9.2*   HCT 25.7* 27.8*    245     Recent Labs     01/09/20  0537 01/08/20  1403    140   K 3.7 3.9   * 109*   CO2 17* 20*   BUN 68* 65*   CREA 6.92* 6.78*   GLU 87 87   CA 8.6 9.2     No results for input(s): PH, PCO2, PO2, PCO2 in the last 72 hours.     Problem List:     Patient Active Problem List    Diagnosis Date Noted    CAD (coronary artery disease) 02/08/2018     Priority: 1 - One    DEANNA (acute kidney injury) ([facility] 01/08/2020    Elevated liver enzymes 01/08/2020    Neuropathy 04/06/2017    Palpitations 12/09/2015    Abnormal EKG 12/09/2015    Anemia 01/10/2015    Anemia associated with acute blood loss 01/10/2015    Other specified arthropathy, shoulder region 01/09/2015    Nontraumatic rupture of tendons of biceps (long head) 01/09/2015    DM2 (diabetes mellitus, type 2) ([facility] 01/09/2015    Coronary atherosclerosis of native coronary artery 07/23/2009    Hypertension 06/30/2009    Hyperlipidemia 06/30/2009    DJD (degenerative joint disease) 06/30/2009    GERD (gastroesophageal reflux disease) 06/30/2009       Impression:    Plan:   1. DEANNA likely 2/2 rhabdomyolysis with CK >14,000 possibly medication induced on statin, no evidence of syncopal episodes.   -continue IVF, and obtain UA, protein/creatinine ratio, SPEP, light chains and renal US.  -hold statin, cymbalta, ACEi, metformin  -no indication to initiate dialysis at this time, monitor renal function and uop, pt agrees to HD if imminent. 2. Elevated LFTs off statin  -hepatitis A,B,C non reactive    3. HTN off ACEi stable     4. DM type II SSI per hosp off metformin for now    5.  Anemia monitoring

## 2020-01-09 NOTE — PROGRESS NOTES
Progress Note    Patient: Justin Maldonado MRN: [id number]  SSN: [id number]    YOB: 1945  Age: 76 y.o. Sex: female      Admit Date: 1/8/2020    LOS: 1 day     Subjective:     Patient came to ER because of abnormal lab test from her primary doctor's office.      Patient has been feeling weak for the past 2 or 3 weeks. She went to see her primary doctor. Part of the work-up she was found to have markedly elevated creatinine and liver enzymes she was advised to come to emergency room for evaluation.     Patient reports losing weight for the past months. She could not tell me how much she lost in terms of her weight but she knew that she must have lost weight. Appetite is poor. No fever. No shortness of breath. No swelling. She thinks her urine output is decreased as well. No joint pain. No skin rashes. She denies nausea vomiting. No abdominal pain. No blood in urine. No blood in her stool. No hematemesis. No hemoptysis.      She also denies sinus symptoms. Dened headache. Patient denies taking over-the-counter medications long-term. No herbal tea.      Denied acutely ill from acute illness. No chronic diarrhea.      In the emergency room she was found to have significant elevation of creatinine, and liver enzymes.      Past medical history significant for diabetes mellitus, CAD post stent placement hypertension, , hyperlipidemia. Patient was found to have elevated creatinine kinase. She was hydrated. She is feeling better today. No shortness of breath. No skin rashes. No joint pain. No fever.      Objective:     Vitals:    01/08/20 2210 01/08/20 2357 01/09/20 0442 01/09/20 0644   BP: 150/78 123/61 107/65 113/69   Pulse: 98 60 60 62   Resp: 18 18 18 18   Temp: 97.4 °F (36.3 °C) 97.5 °F (36.4 °C) 97.6 °F (36.4 °C) 97.8 °F (36.6 °C)   SpO2: 96% 98% 99% 98%   Weight:       Height:            Intake and Output:  Current Shift: 01/09 0701 - 01/09 1900  In: 240 [P.O.:240]  Out: -   Last three shifts: No intake/output data recorded. Physical Exam:     General:                    The patient is a pleasant female in no acute distress.    Head:                                   Normocephalic/atraumatic. Eyes:                                   palpebral pallor, no scleral icterus. ENT:                                    External auricular and nasal exam within normal limits.                                             HVOZNQ membranes are moist.  Neck:                                   Supple, non-tender, no JVD. Lungs:                       Clear to auscultation bilaterally without wheezes or crackles.                                             No respiratory distress or accessory muscle use. Heart:                                  Regular rate and rhythm, without murmurs, rubs, or gallops. Abdomen:                  Soft, non-tender, non-distended with normoactive bowel sounds. Genitourinary:           No tenderness over the bladder or bilateral CVAs. Extremities:               Without clubbing, cyanosis, or edema. Skin:                                    Normal color, texture, and turgor. No rashes, lesions, or jaundice. Pulses:                      Radial and dorsalis pedis pulses present 2+ bilaterally.                                               Capillary refill <2s. Neurologic:                CN II-XII grossly intact and symmetrical.                                               Moving all four extremities well with no focal deficits. Psychiatric:                Pleasant demeanor, appropriate affect.  Alert and oriented x 3    Lab/Data Review:    Recent Results (from the past 24 hour(s))   CBC WITH AUTOMATED DIFF    Collection Time: 01/08/20  2:03 PM   Result Value Ref Range    WBC 6.2 4.3 - 11.1 K/uL    RBC 3.11 (L) 4.05 - 5.2 M/uL    HGB 9.2 (L) 11.7 - 15.4 g/dL    HCT 27.8 (L) 35.8 - 46.3 %    MCV 89.4 79.6 - 97.8 FL    MCH 29.6 26.1 - 32.9 PG MCHC 33.1 31.4 - 35.0 g/dL    RDW 17.3 (H) 11.9 - 14.6 %    PLATELET 861 969 - 246 K/uL    MPV 12.2 9.4 - 12.3 FL    ABSOLUTE NRBC 0.00 0.0 - 0.2 K/uL    DF AUTOMATED      NEUTROPHILS 63 43 - 78 %    LYMPHOCYTES 27 13 - 44 %    MONOCYTES 8 4.0 - 12.0 %    EOSINOPHILS 2 0.5 - 7.8 %    BASOPHILS 1 0.0 - 2.0 %    IMMATURE GRANULOCYTES 0 0.0 - 5.0 %    ABS. NEUTROPHILS 3.9 1.7 - 8.2 K/UL    ABS. LYMPHOCYTES 1.7 0.5 - 4.6 K/UL    ABS. MONOCYTES 0.5 0.1 - 1.3 K/UL    ABS. EOSINOPHILS 0.1 0.0 - 0.8 K/UL    ABS. BASOPHILS 0.0 0.0 - 0.2 K/UL    ABS. IMM. GRANS. 0.0 0.0 - 0.5 K/UL   METABOLIC PANEL, COMPREHENSIVE    Collection Time: 01/08/20  2:03 PM   Result Value Ref Range    Sodium 140 136 - 145 mmol/L    Potassium 3.9 3.5 - 5.1 mmol/L    Chloride 109 (H) 98 - 107 mmol/L    CO2 20 (L) 21 - 32 mmol/L    Anion gap 11 7 - 16 mmol/L    Glucose 87 65 - 100 mg/dL    BUN 65 (H) 8 - 23 MG/DL    Creatinine 6.78 (H) 0.6 - 1.0 MG/DL    GFR est AA 8 (L) >60 ml/min/1.73m2    GFR est non-AA 6 (L) >60 ml/min/1.73m2    Calcium 9.2 8.3 - 10.4 MG/DL    Bilirubin, total 0.6 0.2 - 1.1 MG/DL    ALT (SGPT) 411 (H) 12 - 65 U/L    AST (SGOT) 454 (H) 15 - 37 U/L    Alk.  phosphatase 52 50 - 136 U/L    Protein, total 7.1 6.3 - 8.2 g/dL    Albumin 3.1 (L) 3.2 - 4.6 g/dL    Globulin 4.0 (H) 2.3 - 3.5 g/dL    A-G Ratio 0.8 (L) 1.2 - 3.5     CK WITH MB    Collection Time: 01/08/20  2:03 PM   Result Value Ref Range    CK >14,000 (H) 21 - 215 U/L    CK - MB 84.9 (H) 0.5 - 3.6 ng/ml    CK-MB Index Cannot be calculated <2.5 %   HEPATITIS PANEL, ACUTE    Collection Time: 01/08/20  2:03 PM   Result Value Ref Range    Hepatitis A Ab, IgM NEGATIVE  NEGATIVE      Hep B surface Ag screen NEGATIVE  NEGATIVE      Hep B Core Ab, IgM NEGATIVE  NEGATIVE      Hep C Virus Ab 0.1 0.0 - 0.9 s/co ratio   URINE MICROSCOPIC    Collection Time: 01/08/20  3:39 PM   Result Value Ref Range    WBC 3-5 0 /hpf    RBC 5-10 0 /hpf    Epithelial cells 0-3 0 /hpf    Bacteria 0 0 /hpf Casts 0 0 /lpf    Crystals, urine 0 0 /LPF    Amorphous Crystals 2+ (H) 0    Mucus 0 0 /lpf   METABOLIC PANEL, COMPREHENSIVE    Collection Time: 01/09/20  5:37 AM   Result Value Ref Range    Sodium 143 136 - 145 mmol/L    Potassium 3.7 3.5 - 5.1 mmol/L    Chloride 114 (H) 98 - 107 mmol/L    CO2 17 (L) 21 - 32 mmol/L    Anion gap 12 7 - 16 mmol/L    Glucose 87 65 - 100 mg/dL    BUN 68 (H) 8 - 23 MG/DL    Creatinine 6.92 (H) 0.6 - 1.0 MG/DL    GFR est AA 7 (L) >60 ml/min/1.73m2    GFR est non-AA 6 (L) >60 ml/min/1.73m2    Calcium 8.6 8.3 - 10.4 MG/DL    Bilirubin, total 0.5 0.2 - 1.1 MG/DL    ALT (SGPT) 323 (H) 12 - 65 U/L    AST (SGOT) 337 (H) 15 - 37 U/L    Alk. phosphatase 43 (L) 50 - 136 U/L    Protein, total 5.9 (L) 6.3 - 8.2 g/dL    Albumin 2.4 (L) 3.2 - 4.6 g/dL    Globulin 3.5 2.3 - 3.5 g/dL    A-G Ratio 0.7 (L) 1.2 - 3.5     CBC WITH AUTOMATED DIFF    Collection Time: 01/09/20  5:37 AM   Result Value Ref Range    WBC 5.1 4.3 - 11.1 K/uL    RBC 2.79 (L) 4.05 - 5.2 M/uL    HGB 8.4 (L) 11.7 - 15.4 g/dL    HCT 25.7 (L) 35.8 - 46.3 %    MCV 92.1 79.6 - 97.8 FL    MCH 30.1 26.1 - 32.9 PG    MCHC 32.7 31.4 - 35.0 g/dL    RDW 17.5 (H) 11.9 - 14.6 %    PLATELET 140 714 - 901 K/uL    MPV 12.5 (H) 9.4 - 12.3 FL    ABSOLUTE NRBC 0.00 0.0 - 0.2 K/uL    DF AUTOMATED      NEUTROPHILS 54 43 - 78 %    LYMPHOCYTES 33 13 - 44 %    MONOCYTES 10 4.0 - 12.0 %    EOSINOPHILS 3 0.5 - 7.8 %    BASOPHILS 1 0.0 - 2.0 %    IMMATURE GRANULOCYTES 0 0.0 - 5.0 %    ABS. NEUTROPHILS 2.8 1.7 - 8.2 K/UL    ABS. LYMPHOCYTES 1.7 0.5 - 4.6 K/UL    ABS. MONOCYTES 0.5 0.1 - 1.3 K/UL    ABS. EOSINOPHILS 0.2 0.0 - 0.8 K/UL    ABS. BASOPHILS 0.0 0.0 - 0.2 K/UL    ABS. IMM.  GRANS. 0.0 0.0 - 0.5 K/UL   GLUCOSE, POC    Collection Time: 01/09/20  6:59 AM   Result Value Ref Range    Glucose (POC) 82 65 - 100 mg/dL       Current Facility-Administered Medications:     sodium chloride (NS) flush 5-40 mL, 5-40 mL, IntraVENous, PRN, Dhillon, Rizwan Cedeño, PA    sodium chloride (NS) flush 5-40 mL, 5-40 mL, IntraVENous, Q8H, Argelia Patel MD, 10 mL at 01/09/20 0513    sodium chloride (NS) flush 5-40 mL, 5-40 mL, IntraVENous, PRN, Argelia Patel MD    ondansetron (ZOFRAN) injection 4 mg, 4 mg, IntraVENous, Q6H PRN, Argelia Patel MD    bisacodyl (DULCOLAX) tablet 5 mg, 5 mg, Oral, DAILY PRN, Ada García MD    0.9% sodium chloride infusion, 125 mL/hr, IntraVENous, CONTINUOUS, Argelia Patel MD    insulin lispro (HUMALOG) injection, , SubCUTAneous, AC&HS, Ada García MD, Stopped at 01/08/20 2200      Assessment:     Principal Problem:    DEANNA (acute kidney injury) ([facility] [place] [facility] (1/8/2020)    Active Problems:    Hypertension (6/30/2009)      Hyperlipidemia (6/30/2009)      Coronary atherosclerosis of native coronary artery (7/23/2009)      Overview: 7/2009 - PCI of the RCA 3.5x33 and 3.5x18 Cypher      7/2009 - PCI of the LAD 2.5x18 Cypher, LCX 2.75x13 Cypher with kissing       POBA of small  OM1      06/2013:  Stress MPI with no ischemia and small inferior apical fixed       defect in patient with prior IMI      06/2013:  Normal echo      04/2014:  Stable CAD and FFR LAD 0.88      01/2018:  PCI mLAD for ISR - 2.5x28 Synergy                  DM2 (diabetes mellitus, type 2) ([facility] [place] [facility] (1/9/2015)      Anemia (1/10/2015)      Elevated liver enzymes (1/8/2020)      Non-traumatic rhabdomyolysis (1/9/2020)        Plan:       Acute kidney injury  With rhabdomyolysis  In the presence of elevated liver enzymes  Likely secondary to medication causing liver toxicity and rhabdomyolysis. We will check acute hepatitis profile result  Continue hydration for the patient. Patient has metabolic acidosis now. Will give bicarbonate. Will check kidney ultrasound  Other studies also ordered. Including urine for protein creatinine ratio. Serum protein electrophoresis. Free light chains.      Diabetes mellitus type 2  Monitor blood sugar.   Cover with insulin sliding scale accordingly. We will take her off metformin for now due to her recent poor kidney function.      Hypertension  Monitor blood pressure and manage accordingly. Avoid ACE inhibitor for now.      Hyperlipidemia  Avoid statin for now due to hepatitis. Avoid acetaminophen too.      We will hold aspirin, or Plavix for now in case she needs to have kidney biopsy.      I have discussed with patient regarding advance directive.  Patient would like to have a DNR status.       DVT prophylaxis : SCD       Signed By: Alfred Soto MD     January 9, 2020

## 2020-01-09 NOTE — PROGRESS NOTES
Pt oriented to room and call system. Hourly rounds performed through shift, pt denies needs at this time. Bed in low position and call light/ personal items within reach. Will continue to monitor and report to day shift nurse.

## 2020-01-09 NOTE — PROGRESS NOTES
TRANSFER - IN REPORT:    Verbal report received from Gary Mendez RN(name) on Zoltan  being received from ED(unit) for routine progression of care      Report consisted of patients Situation, Background, Assessment and   Recommendations(SBAR). Information from the following report(s) SBAR, ED Summary, Procedure Summary, Intake/Output, MAR and Recent Results was reviewed with the receiving nurse. Opportunity for questions and clarification was provided. ** request IV be placed in system due to not knowing who or when it was placed. Assessment completed upon patients arrival to unit and care assumed.

## 2020-01-09 NOTE — ED NOTES
TRANSFER - OUT REPORT:    Verbal report given to Osmin Villa RN (name) on [facility] Dima  being transferred to [facility] (unit) for routine progression of care       Report consisted of patients Situation, Background, Assessment and   Recommendations(SBAR). Information from the following report(s) SBAR was reviewed with the receiving nurse. Lines:       Opportunity for questions and clarification was provided.       Patient transported with:   [employer]

## 2020-01-09 NOTE — PROGRESS NOTES
Interdisciplinary Rounds completed. Nursing, Case Management, and Physician  present. Plan of care reviewed and updated. Nephrology following.

## 2020-01-09 NOTE — PROGRESS NOTES
CM met with pt at bedside to complete assessment. Pt presented alert and oriented. Pt lives with her daughter, with a ramp that is wheelchair accessible to assist the pt with entering and exiting the home. Pt states prior to ER admission, she was independent with bathing, dressing herself, cooking, and cleaning. Pt confirmed DME in the home, such as a wheelchair. Pt stated she thinks she may have a bedside commode, but is unsure at this time. Pt voiced no difficulty obtaining medications in the community. Pt states her daughter provides assistance with accessing medications prescribed. The pt does not drive, pt's family assists with transportation. No additional needs voiced at this time. CM continues to follow pt to assist with discharge planning. Care Management Interventions  PCP Verified by CM: Yes  Mode of Transport at Discharge: Other (see comment)  Transition of Care Consult (CM Consult): Discharge Planning  Discharge Durable Medical Equipment: No(Pt confirmed DME in the home, such as a wheelchair. Pt staes she thinks she may have a bedside commode, but was unsure. )  Physical Therapy Consult: No  Occupational Therapy Consult: No  Speech Therapy Consult: No  Current Support Network: Relative's Home(Pt lives with her daughter. )  Confirm Follow Up Transport: Family(Pt's daughter provides assistance with transportation. )  The Plan for Transition of Care is Related to the Following Treatment Goals : Pt to obtain care to become medically stable for discharge. The Patient and/or Patient Representative was Provided with a Choice of Provider and Agrees with the Discharge Plan?: Yes  Name of the Patient Representative Who was Provided with a Choice of Provider and Agrees with the Discharge Plan: Patient Provided with a Choice of Provider and Agrees with the Discharge Plan.   Freedom of Choice List was Provided with Basic Dialogue that Supports the Patient's Individualized Plan of Care/Goals, Treatment Preferences and Shares the Quality Data Associated with the Providers?: Yes   Resource Information Provided?: No  Discharge Location  Discharge Placement: Unable to determine at this time

## 2020-01-09 NOTE — PROGRESS NOTES
Pt home medications to be brought in by family to be reconciled with PTA med list. Pt unsure of medications

## 2020-01-09 NOTE — MED STUDENT NOTES
Progress Note    Patient: Rita Ugalde MRN: [id number]  SSN: [id number]    YOB: 1945  Age: 76 y.o. Sex: female      Admit Date: 1/8/2020    LOS: 1 day     Subjective:   77 yo F presented to the emergency department after feeling worsening weakness for 1 week and lab tests at her PCP revealed abnormal test results with increased creatinine and increased liver function tests. She seems to be experiencing acute kidney injury of currently unknown etiology. She is currently sitting up in bed and eating breakfast. She has not used the restroom since her arrival. She normally uses an electric wheelchair to move around, though a bedside commode will be set up for easier accessibility. Review of Systems  CONSTITUTIONAL: feels worsening weakness and malaise, no nausea  CARDIO: no chest pain, no palpitations  PULM: no shortness of breath   ABDOMEN: no abdominal pain  NEURO: no dizziness     Objective:     Vitals:    01/08/20 2210 01/08/20 2357 01/09/20 0442 01/09/20 0644   BP: 150/78 123/61 107/65 113/69   Pulse: 98 60 60 62   Resp: 18 18 18 18   Temp: 97.4 °F (36.3 °C) 97.5 °F (36.4 °C) 97.6 °F (36.4 °C) 97.8 °F (36.6 °C)   SpO2: 96% 98% 99% 98%   Weight:       Height:            Intake and Output:  Current Shift: No intake/output data recorded. Last three shifts: No intake/output data recorded.     Physical Exam:   GENERAL: alert, cooperative, no distress, appears stated age  LUNG: clear to auscultation bilaterally  HEART: regular rate and rhythm, S1, S2 normal, no murmur, click, rub or gallop    Lab/Data Review:  BMP:   Lab Results   Component Value Date/Time     01/09/2020 05:37 AM    K 3.7 01/09/2020 05:37 AM     (H) 01/09/2020 05:37 AM    CO2 17 (L) 01/09/2020 05:37 AM    AGAP 12 01/09/2020 05:37 AM    GLU 87 01/09/2020 05:37 AM    BUN 68 (H) 01/09/2020 05:37 AM    CREA 6.92 (H) 01/09/2020 05:37 AM    GFRAA 7 (L) 01/09/2020 05:37 AM    GFRNA 6 (L) 01/09/2020 05:37 AM     CMP: Lab Results   Component Value Date/Time     01/09/2020 05:37 AM    K 3.7 01/09/2020 05:37 AM     (H) 01/09/2020 05:37 AM    CO2 17 (L) 01/09/2020 05:37 AM    AGAP 12 01/09/2020 05:37 AM    GLU 87 01/09/2020 05:37 AM    BUN 68 (H) 01/09/2020 05:37 AM    CREA 6.92 (H) 01/09/2020 05:37 AM    GFRAA 7 (L) 01/09/2020 05:37 AM    GFRNA 6 (L) 01/09/2020 05:37 AM    CA 8.6 01/09/2020 05:37 AM    ALB 2.4 (L) 01/09/2020 05:37 AM    TP 5.9 (L) 01/09/2020 05:37 AM    GLOB 3.5 01/09/2020 05:37 AM    AGRAT 0.7 (L) 01/09/2020 05:37 AM    SGOT 337 (H) 01/09/2020 05:37 AM     (H) 01/09/2020 05:37 AM     CBC:   Lab Results   Component Value Date/Time    WBC 5.1 01/09/2020 05:37 AM    HGB 8.4 (L) 01/09/2020 05:37 AM    HCT 25.7 (L) 01/09/2020 05:37 AM     01/09/2020 05:37 AM        Assessment:     Principal Problem:    DEANNA (acute kidney injury) ([facility] (1/8/2020)    Active Problems:    Hypertension (6/30/2009)      Hyperlipidemia (6/30/2009)      Coronary atherosclerosis of native coronary artery (7/23/2009)      Overview: 7/2009 - PCI of the RCA 3.5x33 and 3.5x18 Cypher      7/2009 - PCI of the LAD 2.5x18 Cypher, LCX 2.75x13 Cypher with kissing       POBA of small  OM1      06/2013:  Stress MPI with no ischemia and small inferior apical fixed       defect in patient with prior IMI      06/2013:  Normal echo      04/2014:  Stable CAD and FFR LAD 0.88      01/2018:  PCI mLAD for ISR - 2.5x28 Synergy                  DM2 (diabetes mellitus, type 2) ([facility] (1/9/2015)      Anemia (1/10/2015)      Elevated liver enzymes (1/8/2020)        Plan:     Acute kidney injury  - consult nephrology  - hold ASA, use clopidogrel (Plavix) instead   - may need kidney US and biopsy   - can move to dialysis if kidney function continues to worsen     Possible multiple myeloma due to abnormal light chains found on lab work  - consult oncology      Diabetes mellitus type 2  - no metformin due to kidney function     Hypertension  - monitor  - no ACEI due to kidney function      Hyperlipidemia  - no statins due to kidney function        Signed By: Roma Fleming     January 9, 2020        *ATTENTION:  This note has been created by a medical student for educational purposes only. Please do not refer to the content of this note for clinical decision-making, billing, or other purposes. Please see attending physicians note to obtain clinical information on this patient. *

## 2020-01-10 LAB
ALBUMIN SERPL ELPH-MCNC: 2.86 G/DL (ref 3.2–5.6)
ALBUMIN SERPL-MCNC: 2.4 G/DL (ref 3.2–4.6)
ALBUMIN/GLOB SERPL: 0.7 {RATIO} (ref 1.2–3.5)
ALBUMIN/GLOB SERPL: 1 {RATIO}
ALP SERPL-CCNC: 43 U/L (ref 50–136)
ALPHA1 GLOB SERPL ELPH-MCNC: 0.3 G/DL (ref 0.1–0.4)
ALPHA2 GLOB SERPL ELPH-MCNC: 1 G/DL (ref 0.4–1.2)
ALT SERPL-CCNC: 299 U/L (ref 12–65)
AMORPH CRY URNS QL MICRO: ABNORMAL
ANION GAP SERPL CALC-SCNC: 10 MMOL/L (ref 7–16)
APPEARANCE UR: CLEAR
AST SERPL-CCNC: 258 U/L (ref 15–37)
B-GLOBULIN SERPL QL ELPH: 1.01 G/DL (ref 0.6–1.3)
BACTERIA URNS QL MICRO: 0 /HPF
BILIRUB SERPL-MCNC: 0.5 MG/DL (ref 0.2–1.1)
BILIRUB SERPL-MCNC: 0.5 MG/DL (ref 0.2–1.1)
BILIRUB UR QL: NEGATIVE
BUN SERPL-MCNC: 64 MG/DL (ref 8–23)
CALCIUM SERPL-MCNC: 8.4 MG/DL (ref 8.3–10.4)
CHLORIDE SERPL-SCNC: 108 MMOL/L (ref 98–107)
CK SERPL-CCNC: ABNORMAL U/L (ref 21–215)
CO2 SERPL-SCNC: 21 MMOL/L (ref 21–32)
COLOR UR: YELLOW
CREAT SERPL-MCNC: 7.07 MG/DL (ref 0.6–1)
CREAT UR-MCNC: 21.6 MG/DL
ERYTHROCYTE [DISTWIDTH] IN BLOOD BY AUTOMATED COUNT: 17.4 % (ref 11.9–14.6)
FERRITIN SERPL-MCNC: 70 NG/ML (ref 8–388)
FOLATE SERPL-MCNC: 19.8 NG/ML (ref 3.1–17.5)
GAMMA GLOB MFR SERPL ELPH: 0.62 G/DL (ref 0.5–1.6)
GLOBULIN SER CALC-MCNC: 3.3 G/DL (ref 2.3–3.5)
GLUCOSE BLD STRIP.AUTO-MCNC: 105 MG/DL (ref 65–100)
GLUCOSE BLD STRIP.AUTO-MCNC: 132 MG/DL (ref 65–100)
GLUCOSE BLD STRIP.AUTO-MCNC: 132 MG/DL (ref 65–100)
GLUCOSE BLD STRIP.AUTO-MCNC: 149 MG/DL (ref 65–100)
GLUCOSE SERPL-MCNC: 125 MG/DL (ref 65–100)
GLUCOSE UR STRIP.AUTO-MCNC: NEGATIVE MG/DL
HCT VFR BLD AUTO: 22.4 % (ref 35.8–46.3)
HGB BLD-MCNC: 7.6 G/DL (ref 11.7–15.4)
HGB RETIC QN AUTO: 38 PG (ref 29–35)
HGB UR QL STRIP: ABNORMAL
IGA SERPL-MCNC: 265 MG/DL (ref 85–499)
IGG SERPL-MCNC: 725 MG/DL (ref 610–1616)
IGM SERPL-MCNC: 31 MG/DL (ref 35–242)
IMM RETICS NFR: 19.4 % (ref 3–15.9)
IRON SATN MFR SERPL: 14 %
IRON SERPL-MCNC: 40 UG/DL (ref 35–150)
IRON SERPL-MCNC: 41 UG/DL (ref 35–150)
KETONES UR QL STRIP.AUTO: NEGATIVE MG/DL
LDH SERPL L TO P-CCNC: 797 U/L (ref 110–210)
LEUKOCYTE ESTERASE UR QL STRIP.AUTO: NEGATIVE
M PROTEIN SERPL ELPH-MCNC: ABNORMAL G/DL
MCH RBC QN AUTO: 29.9 PG (ref 26.1–32.9)
MCHC RBC AUTO-ENTMCNC: 33.9 G/DL (ref 31.4–35)
MCV RBC AUTO: 88.2 FL (ref 79.6–97.8)
MYOGLOBIN SERPL-MCNC: ABNORMAL NG/ML (ref 9–82)
NITRITE UR QL STRIP.AUTO: NEGATIVE
NRBC # BLD: 0 K/UL (ref 0–0.2)
OTHER OBSERVATIONS,UCOM: ABNORMAL
PH UR STRIP: 6 [PH] (ref 5–9)
PLATELET # BLD AUTO: 213 K/UL (ref 150–450)
PMV BLD AUTO: 12.6 FL (ref 9.4–12.3)
POTASSIUM SERPL-SCNC: 3.6 MMOL/L (ref 3.5–5.1)
PROT PATTERN SERPL ELPH-IMP: ABNORMAL
PROT PATTERN SPEC IFE-IMP: ABNORMAL
PROT SERPL-MCNC: 5.7 G/DL (ref 6.3–8.2)
PROT SERPL-MCNC: 5.8 G/DL (ref 6.3–8.2)
PROT UR STRIP-MCNC: 30 MG/DL
PROT UR-MCNC: 59 MG/DL
PROT/CREAT UR-RTO: 2.7
RBC # BLD AUTO: 2.54 M/UL (ref 4.05–5.2)
RBC #/AREA URNS HPF: ABNORMAL /HPF
RETICS # AUTO: 0.04 M/UL (ref 0.03–0.1)
RETICS/RBC NFR AUTO: 1.5 % (ref 0.3–2)
SODIUM SERPL-SCNC: 139 MMOL/L (ref 136–145)
SODIUM UR-SCNC: 44 MMOL/L
SP GR UR REFRACTOMETRY: 1 (ref 1–1.02)
TIBC SERPL-MCNC: 284 UG/DL (ref 250–450)
UROBILINOGEN UR QL STRIP.AUTO: 0.2 EU/DL (ref 0.2–1)
VIT B12 SERPL-MCNC: 314 PG/ML (ref 193–986)
WBC # BLD AUTO: 4.9 K/UL (ref 4.3–11.1)
WBC URNS QL MICRO: ABNORMAL /HPF

## 2020-01-10 PROCEDURE — 83540 ASSAY OF IRON: CPT

## 2020-01-10 PROCEDURE — 80053 COMPREHEN METABOLIC PANEL: CPT

## 2020-01-10 PROCEDURE — 82550 ASSAY OF CK (CPK): CPT

## 2020-01-10 PROCEDURE — 82962 GLUCOSE BLOOD TEST: CPT

## 2020-01-10 PROCEDURE — 99223 1ST HOSP IP/OBS HIGH 75: CPT | Performed by: INTERNAL MEDICINE

## 2020-01-10 PROCEDURE — 83874 ASSAY OF MYOGLOBIN: CPT

## 2020-01-10 PROCEDURE — 82247 BILIRUBIN TOTAL: CPT

## 2020-01-10 PROCEDURE — 82607 VITAMIN B-12: CPT

## 2020-01-10 PROCEDURE — 83615 LACTATE (LD) (LDH) ENZYME: CPT

## 2020-01-10 PROCEDURE — 65270000029 HC RM PRIVATE

## 2020-01-10 PROCEDURE — 85027 COMPLETE CBC AUTOMATED: CPT

## 2020-01-10 PROCEDURE — 82728 ASSAY OF FERRITIN: CPT

## 2020-01-10 PROCEDURE — 36415 COLL VENOUS BLD VENIPUNCTURE: CPT

## 2020-01-10 PROCEDURE — 82746 ASSAY OF FOLIC ACID SERUM: CPT

## 2020-01-10 PROCEDURE — 85046 RETICYTE/HGB CONCENTRATE: CPT

## 2020-01-10 PROCEDURE — 83010 ASSAY OF HAPTOGLOBIN QUANT: CPT

## 2020-01-10 RX ADMIN — Medication 10 ML: at 21:20

## 2020-01-10 RX ADMIN — Medication 10 ML: at 14:00

## 2020-01-10 NOTE — PROGRESS NOTES
Interdisciplinary Rounds completed. Nursing, Case Management, and Physician  present. Plan of care reviewed and updated. Oncology and nephrology following.

## 2020-01-10 NOTE — CONSULTS
[facility]        Inpatient Hematology / Oncology Consult Note    Reason for Consult:  DEANNA (acute kidney injury) [facility]) [N17.9]  Referring Physician:  Meliza Novak MD    History of Present Illness:  Ms. Samia Harden is a 76 y.o. female that was sent to ER 1/8/2020 from her PCP for creatinine level of 6.78 and elevated LFTs. She went to see her PCP because of weakness for the past 2-3 weeks. She reported losing weight over the past month but could not quantify. She denied fevers, SOB, joint pain, rashes, nausea, vomiting or abdominal pain. She denies taking any OTC medications/herbs. She has PMH of DM, CAD post stent placement, hypertension, hyperlipidemia. She did not report long term anemia but it is apparent based off of her labs. She states that she had colonoscopy about a year ago that was normal. SPEP no M spike. FLC K/L ratio 2.66. CK >14,000. Nephrology has seen patient and feels that this could be from rhabdomyolysis possibly medication induced on statin (Crestor). We are consulted for recommnendations. Review of Systems:  Constitutional Denies fever, chills, +weight loss, +appetite changes, +fatigue   HEENT Denies trauma, blurry vision, hearing loss, ear pain, nosebleeds, sore throat, neck pain    Skin Denies lesions or rashes. Lungs Denies dyspnea, cough, sputum production or hemoptysis. Cardiovascular Denies chest pain, palpitations, or lower extremity edema. Gastrointestinal Denies nausea, vomiting, changes in bowel habits, bloody or black stools, abdominal pain.  Denies dysuria, frequency or hesitancy of urination. Neuro Denies headaches, visual changes or ataxia. Denies dizziness, tingling, tremors, sensory change, speech change, focal weakness or headaches. MSK Denies back pain, arthralgias, myalgias or frequent falls. Psychiatric/Behavioral The patient is not nervous/anxious.          Allergies   Allergen Reactions    Iodinated Contrast Media Anaphylaxis    Iothalamate Meglumine Anaphylaxis     Allergic to conray ivp dye    Adhesive Tape Other (comments)     Blisters - tolerates newer tapes without problems    Codeine Itching    Demerol [Meperidine] Swelling    Dilaudid [Hydromorphone (Bulk)] Other (comments)     hallucinate    Oxycodone Other (comments)     halucinations-- patient states it was oxycontin, not oxycodone     Past Medical History:   Diagnosis Date    Abnormal EKG 12/9/2015    CAD (coronary artery disease) 2009    MI & 4 stents    Chronic pain     left shoulder    Coagulation disorder (HCC)     anemia: pt reports after a surgery    Diabetes ([facility] 2013    type 2; oral med    DJD (degenerative joint disease) 6/30/2009    DM2 (diabetes mellitus, type 2) ([facility] 1/9/2015    Gastrointestinal disorder     GERD (gastroesophageal reflux disease)     controlled with med    Hypertension     well controlled with med    Nausea & vomiting     requests scopaolamine patch    Neuropathy     Other ill-defined conditions(799.89)     hyperlipidemia/dyslipidemia    Palpitations 12/9/2015    Unspecified adverse effect of anesthesia     pt reports she does not want nerve block with TSA; reports pain at injection site after previous block    Urinary tract infection 1/9/2015     Past Surgical History:   Procedure Laterality Date    CARDIAC SURG PROCEDURE UNLIST      stents x 4    HX APPENDECTOMY      hernia    HX GYN      hysterectomy    HX KNEE ARTHROSCOPY      total of 18 knee surgeries    HX ORTHOPAEDIC      reconstructive L knee    HX SHOULDER REPLACEMENT      right; rotator cuff x 2     Family History   Problem Relation Age of Onset    Arrhythmia Mother     Heart Attack Father     Heart Disease Father     Coronary Artery Disease Other         PREMATURE      Social History     Socioeconomic History    Marital status:      Spouse name: Not on file    Number of children: Not on file    Years of education: Not on file    Highest education level: Not on file   Occupational History    Not on file   Social Needs    Financial resource strain: Not on file    Food insecurity:     Worry: Not on file     Inability: Not on file    Transportation needs:     Medical: Not on file     Non-medical: Not on file   Tobacco Use    Smoking status: Never Smoker    Smokeless tobacco: Never Used   Substance and Sexual Activity    Alcohol use: No     Comment: rare    Drug use: No    Sexual activity: Not on file   Lifestyle    Physical activity:     Days per week: Not on file     Minutes per session: Not on file    Stress: Not on file   Relationships    Social connections:     Talks on phone: Not on file     Gets together: Not on file     Attends Rastafarian service: Not on file     Active member of club or organization: Not on file     Attends meetings of clubs or organizations: Not on file     Relationship status: Not on file    Intimate partner violence:     Fear of current or ex partner: Not on file     Emotionally abused: Not on file     Physically abused: Not on file     Forced sexual activity: Not on file   Other Topics Concern    Not on file   Social History Narrative    Not on file     Current Facility-Administered Medications   Medication Dose Route Frequency Provider Last Rate Last Dose    sodium bicarbonate (8.4%) 100 mEq in dextrose 5% 1,000 mL infusion   IntraVENous CONTINUOUS Ruben Chavarria  mL/hr at 01/09/20 1223      sodium chloride (NS) flush 5-40 mL  5-40 mL IntraVENous PRN KAREL Sánchez        sodium chloride (NS) flush 5-40 mL  5-40 mL IntraVENous Q8H Ruben Chavarria MD   10 mL at 01/09/20 2200    sodium chloride (NS) flush 5-40 mL  5-40 mL IntraVENous PRN Ruben Chavarria MD        ondansetron (ZOFRAN) injection 4 mg  4 mg IntraVENous Q6H PRN Ruben Chavarria MD        bisacodyl (DULCOLAX) tablet 5 mg  5 mg Oral DAILY PRN Ruben Chavarria MD        insulin lispro (HUMALOG) injection   SubCUTAneous AC&HS Lissette Cole MD   Stopped at 20 2200       OBJECTIVE:  Patient Vitals for the past 8 hrs:   BP Temp Pulse Resp SpO2   01/10/20 1606 127/79 97.5 °F (36.4 °C) 72 15 96 %   01/10/20 1050 125/68 97.9 °F (36.6 °C) (!) 59 16 98 %     Temp (24hrs), Av.9 °F (36.6 °C), Min:97.5 °F (36.4 °C), Max:98.3 °F (36.8 °C)    01/10 07 - 01/10 1900  In: 26 [P.O.:237]  Out: 400 [Urine:400]    Physical Exam:  Constitutional: Well developed, well nourished female in no acute distress, sitting comfortably in the hospital bed. HEENT: Normocephalic and atraumatic. Oropharynx is clear, mucous membranes are moist.  Pupils are equal, round, and reactive to light. Extraocular muscles are intact. Sclerae anicteric. Neck supple without JVD. No thyromegaly present. Skin Warm and dry. No bruising and no rash noted. No erythema. No pallor. Respiratory Lungs are clear to auscultation bilaterally without wheezes, rales or rhonchi, normal air exchange without accessory muscle use. CVS Normal rate, regular rhythm and normal S1 and S2. No murmurs, gallops, or rubs. Abdomen Soft, nontender and nondistended, normoactive bowel sounds. No palpable mass. No hepatosplenomegaly. Neuro Grossly nonfocal with no obvious sensory or motor deficits. MSK Normal range of motion in general.  No edema and no tenderness. Psych Appropriate mood and affect.         Labs:    Recent Results (from the past 24 hour(s))   GLUCOSE, POC    Collection Time: 20  8:56 PM   Result Value Ref Range    Glucose (POC) 75 65 - 100 mg/dL   CK    Collection Time: 01/10/20  4:54 AM   Result Value Ref Range    CK 10,291 (H) 21 - 215 U/L   CBC W/O DIFF    Collection Time: 01/10/20  4:54 AM   Result Value Ref Range    WBC 4.9 4.3 - 11.1 K/uL    RBC 2.54 (L) 4.05 - 5.2 M/uL    HGB 7.6 (L) 11.7 - 15.4 g/dL    HCT 22.4 (L) 35.8 - 46.3 %    MCV 88.2 79.6 - 97.8 FL    MCH 29.9 26.1 - 32.9 PG    MCHC 33.9 31.4 - 35.0 g/dL    RDW 17.4 (H) 11.9 - 14.6 %    PLATELET 728 896 - 584 K/uL    MPV 12.6 (H) 9.4 - 12.3 FL    ABSOLUTE NRBC 0.00 0.0 - 0.2 K/uL   METABOLIC PANEL, COMPREHENSIVE    Collection Time: 01/10/20  4:54 AM   Result Value Ref Range    Sodium 139 136 - 145 mmol/L    Potassium 3.6 3.5 - 5.1 mmol/L    Chloride 108 (H) 98 - 107 mmol/L    CO2 21 21 - 32 mmol/L    Anion gap 10 7 - 16 mmol/L    Glucose 125 (H) 65 - 100 mg/dL    BUN 64 (H) 8 - 23 MG/DL    Creatinine 7.07 (H) 0.6 - 1.0 MG/DL    GFR est AA 7 (L) >60 ml/min/1.73m2    GFR est non-AA 6 (L) >60 ml/min/1.73m2    Calcium 8.4 8.3 - 10.4 MG/DL    Bilirubin, total 0.5 0.2 - 1.1 MG/DL    ALT (SGPT) 299 (H) 12 - 65 U/L    AST (SGOT) 258 (H) 15 - 37 U/L    Alk.  phosphatase 43 (L) 50 - 136 U/L    Protein, total 5.7 (L) 6.3 - 8.2 g/dL    Albumin 2.4 (L) 3.2 - 4.6 g/dL    Globulin 3.3 2.3 - 3.5 g/dL    A-G Ratio 0.7 (L) 1.2 - 3.5     GLUCOSE, POC    Collection Time: 01/10/20  7:14 AM   Result Value Ref Range    Glucose (POC) 149 (H) 65 - 100 mg/dL   GLUCOSE, POC    Collection Time: 01/10/20 10:47 AM   Result Value Ref Range    Glucose (POC) 132 (H) 65 - 100 mg/dL   GLUCOSE, POC    Collection Time: 01/10/20  4:05 PM   Result Value Ref Range    Glucose (POC) 105 (H) 65 - 100 mg/dL       Imaging:  [unfilled]    ASSESSMENT:  Problem List  Date Reviewed: 7/30/2019          Codes Class Noted    CAD (coronary artery disease) ICD-10-CM: I25.10  ICD-9-CM: 414.00  2/8/2018        Non-traumatic rhabdomyolysis ICD-10-CM: M62.82  ICD-9-CM: 728.88  1/9/2020        * (Principal) DEANNA (acute kidney injury) ([facility] [address] ICD-10-CM: N17.9  ICD-9-CM: 584.9  1/8/2020        Elevated liver enzymes ICD-10-CM: R74.8  ICD-9-CM: 790.5  1/8/2020        Neuropathy (Chronic) ICD-10-CM: G62.9  ICD-9-CM: 355.9  4/6/2017        Palpitations ICD-10-CM: R00.2  ICD-9-CM: 785.1  12/9/2015        Abnormal EKG ICD-10-CM: R94.31  ICD-9-CM: 794.31  12/9/2015        Anemia ICD-10-CM: D64.9  ICD-9-CM: 285.9  1/10/2015 Anemia associated with acute blood loss ICD-10-CM: D62  ICD-9-CM: 285.1  1/10/2015        Other specified arthropathy, shoulder region ICD-10-CM: M12.819  ICD-9-CM: 716.81  1/9/2015        Nontraumatic rupture of tendons of biceps (long head) ICD-10-CM: K94.872  ICD-9-CM: 727.62  1/9/2015        DM2 (diabetes mellitus, type 2) ([facility] ICD-10-CM: E11.9  ICD-9-CM: 250.00  1/9/2015        Coronary atherosclerosis of native coronary artery (Chronic) ICD-10-CM: I25.10  ICD-9-CM: 414.01  7/23/2009    Overview Addendum 2/8/2018  1:13 PM by Shanta Childress MD     7/2009 - PCI of the RCA 3.5x33 and 3.5x18 Cypher  7/2009 - PCI of the LAD 2.5x18 Cypher, LCX 2.75x13 Cypher with kissing POBA of small  OM1  06/2013:  Stress MPI with no ischemia and small inferior apical fixed defect in patient with prior IMI  06/2013:  Normal echo  04/2014:  Stable CAD and FFR LAD 0.88  01/2018:  PCI mLAD for ISR - 2.5x28 Synergy                 Hypertension (Chronic) ICD-10-CM: I10  ICD-9-CM: 401.9  6/30/2009        Hyperlipidemia (Chronic) ICD-10-CM: E78.5  ICD-9-CM: 272.4  6/30/2009        DJD (degenerative joint disease) (Chronic) ICD-10-CM: M19.90  ICD-9-CM: 715.90  6/30/2009        GERD (gastroesophageal reflux disease) (Chronic) ICD-10-CM: K21.9  ICD-9-CM: 530.81  6/30/2009                RECOMMENDATIONS:  DEANNA  -likely rhabdomyolysis with CK>14,000. Likely medication induced. Continue IV fluids. Nephrology following. Stopping Crestor, Cymbalta, ACE, metformin. Check myoglobin. Elevated LFTs  -Hepatitis panel negative. Improving since admission    Anemia  -Check iron studies, hemolysis labs    Wheelchair bound due to multiple knee surgeries      Lab studies and imaging studies were personally reviewed. Pertinent old records were reviewed. Thank you for allowing us to participate in the care of Ms. Dos Santos. We will follow up on labs.           Kath Mcneil NP   [facility]  [address] [address]  Office : [phone]  Fax : [phone]  I personally saw, exammed and counselled the patient, and discussed with NP, agree with above history/assessment/plan. 76 y. o.female H/O CAD/HTN/DM developed weight loss and severe anemia, liver dysfunction and CPK [address], DEANNA Cr 7, admitted as rhabdomyolysis, consulted for anemia and elevated FLC, check iron/B12/FA/hemolysis/SPEP/UPEP, will follow. Sourav Boone M.D.   [address]  Office : [phone]  Fax : [phone]

## 2020-01-10 NOTE — PROGRESS NOTES
[facility]        Subjective: A vs CKD? No new complaints    Review of Systems -   General ROS: negative for - fever, chills  Respiratory ROS: no SOB, cough, SYED  Cardiovascular ROS: no CP, palpitations  Gastrointestinal ROS: no N&V, abdominal pain, diarrhea  Genito-Urinary ROS: no difficulty voiding, dysuria  Neurological ROS: no seizures, focal weekness        Objective:    Vitals:    01/09/20 2347 01/10/20 0312 01/10/20 0716 01/10/20 1050   BP: 108/65 110/62 140/74 125/68   Pulse: 74 63 66 (!) 59   Resp: 16 18 15 16   Temp: 98.1 °F (36.7 °C) 97.5 °F (36.4 °C) 98.3 °F (36.8 °C) 97.9 °F (36.6 °C)   SpO2: 94% 94% 96% 98%   Weight:       Height:           PE  Gen: in no acute distress  CV:reg rate  Chest:clear  Abd: soft  Ext/Access: no edema       . LAB  Recent Labs     01/10/20  0454 01/09/20  0537 01/08/20  1403   WBC 4.9 5.1 6.2   HGB 7.6* 8.4* 9.2*   HCT 22.4* 25.7* 27.8*    218 245     Recent Labs     01/10/20  0454 01/09/20  0537 01/08/20  1403    143 140   K 3.6 3.7 3.9   * 114* 109*   CO2 21 17* 20*   * 87 87   BUN 64* 68* 65*   CREA 7.07* 6.92* 6.78*   CA 8.4 8.6 9.2   ALB 2.4* 2.4* 3.1*   TBILI 0.5 0.5 0.6   * 323* 411*   SGOT 258* 337* 454*           Radiology    A/P:   Patient Active Problem List   Diagnosis Code    Hypertension I10    Hyperlipidemia E78.5    DJD (degenerative joint disease) M19.90    GERD (gastroesophageal reflux disease) K21.9    Coronary atherosclerosis of native coronary artery I25.10    Other specified arthropathy, shoulder region M12.819    Nontraumatic rupture of tendons of biceps (long head) M66.329    DM2 (diabetes mellitus, type 2) (HCC) E11.9    Anemia D64.9    Anemia associated with acute blood loss D62    Palpitations R00.2    Abnormal EKG R94.31    Neuropathy G62.9    CAD (coronary artery disease) I25.10    DEANNA (acute kidney injury) (HCC) N17.9    Elevated liver enzymes R74.8    Non-traumatic rhabdomyolysis M62.82       DEANNA vs CKD - workup in progress. Renal US shows normal size, but increased echogenicity bilat. Urine studies still pending. Elevated K/L ratio noted, Onc consulted. Labs are stable. No acute need for dialysis.     Abnormal LFT's    HTN    Anemia    DM    A      Sheryl Vasquez MD

## 2020-01-10 NOTE — PROGRESS NOTES
Progress Note    Patient: Emperatriz Sanchez MRN: [id number]  SSN: [id number]    YOB: 1945  Age: 76 y.o. Sex: female      Admit Date: 1/8/2020    LOS: 2 days     Subjective:     Patient came to ER because of abnormal lab test from her primary doctor's office.      Patient has been feeling weak for the past 2 or 3 weeks. She went to see her primary doctor. Part of the work-up she was found to have markedly elevated creatinine and liver enzymes she was advised to come to emergency room for evaluation.     Patient reports losing weight for the past months. She could not tell me how much she lost in terms of her weight but she knew that she must have lost weight. Appetite is poor. No fever. No shortness of breath. No swelling. She thinks her urine output is decreased as well. No joint pain. No skin rashes. She denies nausea vomiting. No abdominal pain. No blood in urine. No blood in her stool. No hematemesis. No hemoptysis.      She also denies sinus symptoms. Dened headache. Patient denies taking over-the-counter medications long-term. No herbal tea.      Denied acutely ill from acute illness. No chronic diarrhea.      In the emergency room she was found to have significant elevation of creatinine, and liver enzymes.      Past medical history significant for diabetes mellitus, CAD post stent placement hypertension, , hyperlipidemia. Patient was found to have elevated creatinine kinase. She was hydrated. She is feeling better today. No shortness of breath. No skin rashes. No joint pain. No fever. Yesterday her light chain came back as elevated. Oncology is consulted. Still pending for input.      Objective:     Vitals:    01/09/20 1950 01/09/20 2347 01/10/20 0312 01/10/20 0716   BP: 128/71 108/65 110/62 140/74   Pulse: 64 74 63 66   Resp: 18 16 18 15   Temp: 98.3 °F (36.8 °C) 98.1 °F (36.7 °C) 97.5 °F (36.4 °C) 98.3 °F (36.8 °C)   SpO2: 99% 94% 94% 96%   Weight: Height:            Intake and Output:  Current Shift: No intake/output data recorded. Last three shifts: 01/08 1901 - 01/10 0700  In: 480 [P.O.:480]  Out: 300 [Urine:300]    Physical Exam:     General:                    The patient is a pleasant female in no acute distress.  Patient has been feeling weak. Not much appetite. RGIJ:                                   [id number]. Eyes:                                   palpebral pallor, no scleral icterus. ENT:                                    External auricular and nasal exam within normal limits.                                             ZEXCDP membranes are moist.  Neck:                                   Supple, non-tender, no JVD. Lungs:                       Clear to auscultation bilaterally without wheezes or crackles.                                             No respiratory distress or accessory muscle use. Heart:                                  Regular rate and rhythm, without murmurs, rubs, or gallops. Abdomen:                  Soft, non-tender, non-distended with normoactive bowel sounds. Genitourinary:           No tenderness over the bladder or bilateral CVAs. Extremities:               Without clubbing, cyanosis, or edema. Skin:                                    Normal color, texture, and turgor. No rashes, lesions, or jaundice. Pulses:                      Radial and dorsalis pedis pulses present 2+ bilaterally.                                               Capillary refill <2s. Neurologic:                CN II-XII grossly intact and symmetrical.                                               Moving all four extremities well with no focal deficits. Psychiatric:                Pleasant demeanor, appropriate affect.  Alert and oriented x 3    Lab/Data Review:    Recent Results (from the past 24 hour(s))   PROTEIN ELEC WITH RADHA, SERUM    Collection Time: 01/09/20 10:46 AM   Result Value Ref Range    Protein, total 5.8 (L) 6.3 - 8.2 g/dL    A-G Ratio PENDING      ALBUMIN PENDING g/dL    Alpha-1 globulin PENDING g/dL    ALPHA 2 PENDING g/dL    Beta-globulin PENDING g/dL    Gamma-globulin PENDING 10 - 12 g/dL    M-Guerrero PENDING 0 g/dL    Immunoglobulin G 725 610 - 1,616 mg/dL    Immunoglobulin A 265 85 - 499 mg/dL    Immunoglobulin M 31 (L) 35 - 242 mg/dL    PEP Interpretation PENDING     RADHA Interpretation PENDING    FREE LIGHT CHAINS, KAPPA/LAMBDA, QT    Collection Time: 01/09/20 10:46 AM   Result Value Ref Range    Kappa Free Light Chain 131.36 (H) 3.30 - 19.40 mg/L    Lambda Free Light Chain 49.31 (H) 5.71 - 26.30 mg/L    Kappa/Lambda Ratio 2.66 (H) 0.26 - 1.65     GLUCOSE, POC    Collection Time: 01/09/20 12:42 PM   Result Value Ref Range    Glucose (POC) 53 (L) 65 - 100 mg/dL   GLUCOSE, POC    Collection Time: 01/09/20  1:16 PM   Result Value Ref Range    Glucose (POC) 72 65 - 100 mg/dL   GLUCOSE, POC    Collection Time: 01/09/20  2:32 PM   Result Value Ref Range    Glucose (POC) 158 (H) 65 - 100 mg/dL   GLUCOSE, POC    Collection Time: 01/09/20  4:08 PM   Result Value Ref Range    Glucose (POC) 178 (H) 65 - 100 mg/dL   GLUCOSE, POC    Collection Time: 01/09/20  8:56 PM   Result Value Ref Range    Glucose (POC) 75 65 - 100 mg/dL   CK    Collection Time: 01/10/20  4:54 AM   Result Value Ref Range    CK 10,291 (H) 21 - 215 U/L   CBC W/O DIFF    Collection Time: 01/10/20  4:54 AM   Result Value Ref Range    WBC 4.9 4.3 - 11.1 K/uL    RBC 2.54 (L) 4.05 - 5.2 M/uL    HGB 7.6 (L) 11.7 - 15.4 g/dL    HCT 22.4 (L) 35.8 - 46.3 %    MCV 88.2 79.6 - 97.8 FL    MCH 29.9 26.1 - 32.9 PG    MCHC 33.9 31.4 - 35.0 g/dL    RDW 17.4 (H) 11.9 - 14.6 %    PLATELET 493 440 - 076 K/uL    MPV 12.6 (H) 9.4 - 12.3 FL    ABSOLUTE NRBC 0.00 0.0 - 0.2 K/uL   METABOLIC PANEL, COMPREHENSIVE    Collection Time: 01/10/20  4:54 AM   Result Value Ref Range    Sodium 139 136 - 145 mmol/L    Potassium 3.6 3.5 - 5.1 mmol/L    Chloride 108 (H) 98 - 107 mmol/L    CO2 21 21 - 32 mmol/L    Anion gap 10 7 - 16 mmol/L    Glucose 125 (H) 65 - 100 mg/dL    BUN 64 (H) 8 - 23 MG/DL    Creatinine 7.07 (H) 0.6 - 1.0 MG/DL    GFR est AA 7 (L) >60 ml/min/1.73m2    GFR est non-AA 6 (L) >60 ml/min/1.73m2    Calcium 8.4 8.3 - 10.4 MG/DL    Bilirubin, total 0.5 0.2 - 1.1 MG/DL    ALT (SGPT) 299 (H) 12 - 65 U/L    AST (SGOT) 258 (H) 15 - 37 U/L    Alk. phosphatase 43 (L) 50 - 136 U/L    Protein, total 5.7 (L) 6.3 - 8.2 g/dL    Albumin 2.4 (L) 3.2 - 4.6 g/dL    Globulin 3.3 2.3 - 3.5 g/dL    A-G Ratio 0.7 (L) 1.2 - 3.5     GLUCOSE, POC    Collection Time: 01/10/20  7:14 AM   Result Value Ref Range    Glucose (POC) 149 (H) 65 - 100 mg/dL     US retroperitoneum  1-9-2020    IMPRESSION:     1. Diffusely increased renal cortical echogenicity bilaterally, most likely  related to medical renal disease.     2. No evidence of obstructive uropathy.     3. 0.4 cm right renal calculus.       Current Facility-Administered Medications:     sodium bicarbonate (8.4%) 100 mEq in dextrose 5% 1,000 mL infusion, , IntraVENous, CONTINUOUS, Argelia Patel MD, Last Rate: 125 mL/hr at 01/09/20 1223    sodium chloride (NS) flush 5-40 mL, 5-40 mL, IntraVENous, PRN, Abbey Dhillon PA    sodium chloride (NS) flush 5-40 mL, 5-40 mL, IntraVENous, Q8H, Argelia Patel MD, 10 mL at 01/09/20 2200    sodium chloride (NS) flush 5-40 mL, 5-40 mL, IntraVENous, PRN, Argelia Patel MD    ondansetron (ZOFRAN) injection 4 mg, 4 mg, IntraVENous, Q6H PRN, Argelia Patel MD    bisacodyl (DULCOLAX) tablet 5 mg, 5 mg, Oral, DAILY PRN, Heena Shelby MD    insulin lispro (HUMALOG) injection, , SubCUTAneous, AC&HS, Heena Shelby MD, Stopped at 01/09/20 2200      Assessment:     Principal Problem:    DEANNA [facility] (1/8/2020)    Active Problems:    Hypertension (6/30/2009)      Hyperlipidemia (6/30/2009)      Coronary atherosclerosis of native coronary artery (7/23/2009)      Overview: 7/2009 - PCI of the RCA 3.5x33 and 3.5x18 Cypher      7/2009 - PCI of the LAD 2.5x18 Cypher, LCX 2.75x13 Cypher with kissing       POBA of small  OM1      06/2013:  Stress MPI with no ischemia and small inferior apical fixed       defect in patient with prior IMI      06/2013:  Normal echo      04/2014:  Stable CAD and FFR LAD 0.88      01/2018:  PCI mLAD for ISR - 2.5x28 Synergy                  DM2 (diabetes mellitus, type 2) ([facility] [address] (1/9/2015)      Anemia (1/10/2015)      Elevated liver enzymes (1/8/2020)      Non-traumatic rhabdomyolysis (1/9/2020)        Plan:       Acute kidney injury  With rhabdomyolysis  In the presence of elevated liver enzymes  Likely secondary to medication causing liver toxicity and rhabdomyolysis. So far hepatitis panel is negative. Patient has metabolic acidosis now. Improved with bicarbonate. Kidney ultrasound consistent with some chronic medical changes. Free light chains are elevated. Oncology has been consulted.      Diabetes mellitus type 2  Monitor blood sugar. Cover with insulin sliding scale accordingly. We will take her off metformin for now due to her recent poor kidney function.      Hypertension  Monitor blood pressure and manage accordingly. Avoid ACE inhibitor for now.      Hyperlipidemia  Avoid statin for now due to hepatitis. Avoid acetaminophen too.      We will hold aspirin, or Plavix for now in case she needs to have kidney biopsy.      I have discussed with patient regarding advance directive.  Patient would like to have a DNR status.       DVT prophylaxis : SCD       Signed By: Nicole Sequeira MD     January 10, 2020

## 2020-01-11 LAB
ALBUMIN SERPL-MCNC: 2.4 G/DL (ref 3.2–4.6)
ALBUMIN/GLOB SERPL: 0.7 {RATIO} (ref 1.2–3.5)
ALP SERPL-CCNC: 45 U/L (ref 50–136)
ALT SERPL-CCNC: 288 U/L (ref 12–65)
ANION GAP SERPL CALC-SCNC: 11 MMOL/L (ref 7–16)
AST SERPL-CCNC: 241 U/L (ref 15–37)
BASOPHILS # BLD: 0.1 K/UL (ref 0–0.2)
BASOPHILS NFR BLD: 1 % (ref 0–2)
BILIRUB SERPL-MCNC: 0.5 MG/DL (ref 0.2–1.1)
BUN SERPL-MCNC: 62 MG/DL (ref 8–23)
CALCIUM SERPL-MCNC: 8.6 MG/DL (ref 8.3–10.4)
CHLORIDE SERPL-SCNC: 108 MMOL/L (ref 98–107)
CO2 SERPL-SCNC: 24 MMOL/L (ref 21–32)
CREAT SERPL-MCNC: 7.35 MG/DL (ref 0.6–1)
DIFFERENTIAL METHOD BLD: ABNORMAL
EOSINOPHIL # BLD: 0.2 K/UL (ref 0–0.8)
EOSINOPHIL NFR BLD: 3 % (ref 0.5–7.8)
ERYTHROCYTE [DISTWIDTH] IN BLOOD BY AUTOMATED COUNT: 17.5 % (ref 11.9–14.6)
GLOBULIN SER CALC-MCNC: 3.4 G/DL (ref 2.3–3.5)
GLUCOSE BLD STRIP.AUTO-MCNC: 112 MG/DL (ref 65–100)
GLUCOSE BLD STRIP.AUTO-MCNC: 138 MG/DL (ref 65–100)
GLUCOSE BLD STRIP.AUTO-MCNC: 159 MG/DL (ref 65–100)
GLUCOSE BLD STRIP.AUTO-MCNC: 165 MG/DL (ref 65–100)
GLUCOSE SERPL-MCNC: 106 MG/DL (ref 65–100)
HCT VFR BLD AUTO: 24.9 % (ref 35.8–46.3)
HGB BLD-MCNC: 8.5 G/DL (ref 11.7–15.4)
IMM GRANULOCYTES # BLD AUTO: 0 K/UL (ref 0–0.5)
IMM GRANULOCYTES NFR BLD AUTO: 0 % (ref 0–5)
LYMPHOCYTES # BLD: 1.5 K/UL (ref 0.5–4.6)
LYMPHOCYTES NFR BLD: 27 % (ref 13–44)
MCH RBC QN AUTO: 30.1 PG (ref 26.1–32.9)
MCHC RBC AUTO-ENTMCNC: 34.1 G/DL (ref 31.4–35)
MCV RBC AUTO: 88.3 FL (ref 79.6–97.8)
MONOCYTES # BLD: 0.5 K/UL (ref 0.1–1.3)
MONOCYTES NFR BLD: 10 % (ref 4–12)
NEUTS SEG # BLD: 3.1 K/UL (ref 1.7–8.2)
NEUTS SEG NFR BLD: 59 % (ref 43–78)
NRBC # BLD: 0 K/UL (ref 0–0.2)
PLATELET # BLD AUTO: 218 K/UL (ref 150–450)
PLATELET COMMENTS,PCOM: ADEQUATE
PMV BLD AUTO: 12.5 FL (ref 9.4–12.3)
POTASSIUM SERPL-SCNC: 3.6 MMOL/L (ref 3.5–5.1)
PROT SERPL-MCNC: 5.8 G/DL (ref 6.3–8.2)
RBC # BLD AUTO: 2.82 M/UL (ref 4.05–5.2)
RBC MORPH BLD: ABNORMAL
SODIUM SERPL-SCNC: 143 MMOL/L (ref 136–145)
WBC # BLD AUTO: 5.4 K/UL (ref 4.3–11.1)

## 2020-01-11 PROCEDURE — 74011250636 HC RX REV CODE- 250/636: Performed by: NURSE PRACTITIONER

## 2020-01-11 PROCEDURE — 99232 SBSQ HOSP IP/OBS MODERATE 35: CPT | Performed by: INTERNAL MEDICINE

## 2020-01-11 PROCEDURE — 74011250636 HC RX REV CODE- 250/636: Performed by: INTERNAL MEDICINE

## 2020-01-11 PROCEDURE — 74011000258 HC RX REV CODE- 258: Performed by: NURSE PRACTITIONER

## 2020-01-11 PROCEDURE — 82962 GLUCOSE BLOOD TEST: CPT

## 2020-01-11 PROCEDURE — 80053 COMPREHEN METABOLIC PANEL: CPT

## 2020-01-11 PROCEDURE — 85025 COMPLETE CBC W/AUTO DIFF WBC: CPT

## 2020-01-11 PROCEDURE — 86335 IMMUNFIX E-PHORSIS/URINE/CSF: CPT

## 2020-01-11 PROCEDURE — 74011636637 HC RX REV CODE- 636/637: Performed by: INTERNAL MEDICINE

## 2020-01-11 PROCEDURE — 65270000029 HC RM PRIVATE

## 2020-01-11 PROCEDURE — 74011000250 HC RX REV CODE- 250: Performed by: INTERNAL MEDICINE

## 2020-01-11 PROCEDURE — 36415 COLL VENOUS BLD VENIPUNCTURE: CPT

## 2020-01-11 PROCEDURE — 84156 ASSAY OF PROTEIN URINE: CPT

## 2020-01-11 RX ORDER — SODIUM FERRIC GLUCONATE COMPLEX IN SUCROSE 12.5 MG/ML
125 INJECTION INTRAVENOUS
Status: DISCONTINUED | OUTPATIENT
Start: 2020-01-11 | End: 2020-01-11 | Stop reason: SDUPTHER

## 2020-01-11 RX ORDER — LANOLIN ALCOHOL/MO/W.PET/CERES
1 CREAM (GRAM) TOPICAL
Status: DISCONTINUED | OUTPATIENT
Start: 2020-01-12 | End: 2020-01-15

## 2020-01-11 RX ADMIN — SODIUM CHLORIDE 125 MG: 900 INJECTION, SOLUTION INTRAVENOUS at 17:29

## 2020-01-11 RX ADMIN — Medication 10 ML: at 15:45

## 2020-01-11 RX ADMIN — Medication 10 ML: at 06:01

## 2020-01-11 RX ADMIN — SODIUM BICARBONATE: 84 INJECTION, SOLUTION INTRAVENOUS at 08:25

## 2020-01-11 RX ADMIN — INSULIN LISPRO 2 UNITS: 100 INJECTION, SOLUTION INTRAVENOUS; SUBCUTANEOUS at 22:09

## 2020-01-11 RX ADMIN — INSULIN LISPRO 2 UNITS: 100 INJECTION, SOLUTION INTRAVENOUS; SUBCUTANEOUS at 17:28

## 2020-01-11 RX ADMIN — SODIUM BICARBONATE: 84 INJECTION, SOLUTION INTRAVENOUS at 17:28

## 2020-01-11 RX ADMIN — Medication 10 ML: at 22:10

## 2020-01-11 NOTE — PROGRESS NOTES
Rounded on pt throughout shift. All needs met this shift. Will continue to monitor until night shift RN comes on. Bed low/locked and call bell within reach.

## 2020-01-11 NOTE — PROGRESS NOTES
Progress Note    Patient: Kb Robert MRN: [id number]  SSN: [id number]    YOB: 1945  Age: 76 y.o. Sex: female      Admit Date: 1/8/2020    LOS: 3 days     Subjective:     Patient came to ER because of abnormal lab test from her primary doctor's office.      Patient has been feeling weak for the past 2 or 3 weeks. She went to see her primary doctor. Part of the work-up she was found to have markedly elevated creatinine and liver enzymes she was advised to come to emergency room for evaluation.     Patient reports losing weight for the past months. She could not tell me how much she lost in terms of her weight but she knew that she must have lost weight. Appetite is poor. No fever. No shortness of breath. No swelling. She thinks her urine output is decreased as well. No joint pain. No skin rashes. She denies nausea vomiting. No abdominal pain. No blood in urine. No blood in her stool. No hematemesis. No hemoptysis.      She also denies sinus symptoms. Dened headache. Patient denies taking over-the-counter medications long-term. No herbal tea.      Denied acutely ill from acute illness. No chronic diarrhea.      In the emergency room she was found to have significant elevation of creatinine, and liver enzymes.      Past medical history significant for diabetes mellitus, CAD post stent placement hypertension, , hyperlipidemia. Patient was found to have elevated creatinine kinase. She was hydrated. Creatinine continues to get worse. Lamda light chains are elevated. Oncologist was consulted. Objective:     Vitals:    01/10/20 2027 01/10/20 2322 01/11/20 0408 01/11/20 0739   BP: 147/78 133/83 123/71 153/88   Pulse: 66 72 68 62   Resp: 15 17 18 18   Temp: 98.3 °F (36.8 °C) 98.7 °F (37.1 °C) 98.1 °F (36.7 °C) 98.1 °F (36.7 °C)   SpO2: 98% 97% 94% 96%   Weight:       Height:            Intake and Output:  Current Shift: No intake/output data recorded.   Last three shifts: 01/09 1901 - 01/11 0700  In: 717 [P.O.:717]  Out: 400 [Urine:400]    Physical Exam:     General:                    The patient is a pleasant female in no acute distress.  Patient has been feeling weak. Not much appetite. ANYN:                                   LHKFUXTSDARIB/ATYFTHHAFU. Eyes:                                   palpebral pallor, no scleral icterus. ENT:                                    External auricular and nasal exam within normal limits.                                             BXTSNM membranes are moist.  Neck:                                   Supple, non-tender, no JVD. Lungs:                       Clear to auscultation bilaterally without wheezes or crackles.                                             No respiratory distress or accessory muscle use. Heart:                                  Regular rate and rhythm, without murmurs, rubs, or gallops. Abdomen:                  Soft, non-tender, non-distended with normoactive bowel sounds. Genitourinary:           No tenderness over the bladder or bilateral CVAs. Extremities:               Without clubbing, cyanosis, or edema. Skin:                                    Normal color, texture, and turgor. No rashes, lesions, or jaundice. Pulses:                      Radial and dorsalis pedis pulses present 2+ bilaterally.                                               Capillary refill <2s. Neurologic:                CN II-XII grossly intact and symmetrical.                                               Moving all four extremities well with no focal deficits. Psychiatric:                Pleasant demeanor, appropriate affect.  Alert and oriented x 3    Lab/Data Review:    Recent Results (from the past 24 hour(s))   GLUCOSE, POC    Collection Time: 01/10/20 10:47 AM   Result Value Ref Range    Glucose (POC) 132 (H) 65 - 100 mg/dL   GLUCOSE, POC    Collection Time: 01/10/20  4:05 PM   Result Value Ref Range    Glucose (POC) 105 (H) 65 - 100 mg/dL   IRON    Collection Time: 01/10/20  8:56 PM   Result Value Ref Range    Iron 41 35 - 150 ug/dL   VITAMIN B12    Collection Time: 01/10/20  8:56 PM   Result Value Ref Range    Vitamin B12 314 193 - 986 pg/mL   FOLATE    Collection Time: 01/10/20  8:56 PM   Result Value Ref Range    Folate 19.8 (H) 3.1 - 17.5 ng/mL   TRANSFERRIN SATURATION    Collection Time: 01/10/20  8:56 PM   Result Value Ref Range    Iron 40 35 - 150 ug/dL    TIBC 284 250 - 450 ug/dL    Transferrin Saturation 14 (L) >20 %   FERRITIN    Collection Time: 01/10/20  8:56 PM   Result Value Ref Range    Ferritin 70 8 - 388 NG/ML   LD    Collection Time: 01/10/20  8:56 PM   Result Value Ref Range     (H) 110 - 210 U/L   RETICULOCYTE COUNT    Collection Time: 01/10/20  8:56 PM   Result Value Ref Range    Reticulocyte count 1.5 0.3 - 2.0 %    Absolute Retic Cnt. 0.0405 0.026 - 0.095 M/ul    Immature Retic Fraction 19.4 (H) 3.0 - 15.9 %    Retic Hgb Conc. 38 (H) 29 - 35 pg   BILIRUBIN, TOTAL    Collection Time: 01/10/20  8:56 PM   Result Value Ref Range    Bilirubin, total 0.5 0.2 - 1.1 MG/DL   MYOGLOBIN    Collection Time: 01/10/20  8:56 PM   Result Value Ref Range    Myoglobin >20,000 (H) 9 - 82 ng/mL   GLUCOSE, POC    Collection Time: 01/10/20  9:58 PM   Result Value Ref Range    Glucose (POC) 132 (H) 65 - 100 mg/dL   GLUCOSE, POC    Collection Time: 01/11/20  7:36 AM   Result Value Ref Range    Glucose (POC) 112 (H) 65 - 100 mg/dL   CBC WITH AUTOMATED DIFF    Collection Time: 01/11/20  7:43 AM   Result Value Ref Range    WBC 5.4 4.3 - 11.1 K/uL    RBC 2.82 (L) 4.05 - 5.2 M/uL    HGB 8.5 (L) 11.7 - 15.4 g/dL    HCT 24.9 (L) 35.8 - 46.3 %    MCV 88.3 79.6 - 97.8 FL    MCH 30.1 26.1 - 32.9 PG    MCHC 34.1 31.4 - 35.0 g/dL    RDW 17.5 (H) 11.9 - 14.6 %    PLATELET 463 857 - 481 K/uL    MPV 12.5 (H) 9.4 - 12.3 FL    ABSOLUTE NRBC 0.00 0.0 - 0.2 K/uL    DF PENDING    METABOLIC PANEL, COMPREHENSIVE    Collection Time: 01/11/20  7:43 AM   Result Value Ref Range    Sodium 143 136 - 145 mmol/L    Potassium 3.6 3.5 - 5.1 mmol/L    Chloride 108 (H) 98 - 107 mmol/L    CO2 24 21 - 32 mmol/L    Anion gap 11 7 - 16 mmol/L    Glucose 106 (H) 65 - 100 mg/dL    BUN 62 (H) 8 - 23 MG/DL    Creatinine 7.35 (H) 0.6 - 1.0 MG/DL    GFR est AA 7 (L) >60 ml/min/1.73m2    GFR est non-AA 6 (L) >60 ml/min/1.73m2    Calcium 8.6 8.3 - 10.4 MG/DL    Bilirubin, total 0.5 0.2 - 1.1 MG/DL    ALT (SGPT) 288 (H) 12 - 65 U/L    AST (SGOT) 241 (H) 15 - 37 U/L    Alk. phosphatase 45 (L) 50 - 136 U/L    Protein, total 5.8 (L) 6.3 - 8.2 g/dL    Albumin 2.4 (L) 3.2 - 4.6 g/dL    Globulin 3.4 2.3 - 3.5 g/dL    A-G Ratio 0.7 (L) 1.2 - 3.5       US retroperitoneum  1-9-2020    IMPRESSION:     1. Diffusely increased renal cortical echogenicity bilaterally, most likely  related to medical renal disease.     2. No evidence of obstructive uropathy.     3. 0.4 cm right renal calculus.       Current Facility-Administered Medications:     sodium bicarbonate (8.4%) 100 mEq in dextrose 5% 1,000 mL infusion, , IntraVENous, CONTINUOUS, Argelia Patel MD, Last Rate: 125 mL/hr at 01/11/20 0825    sodium chloride (NS) flush 5-40 mL, 5-40 mL, IntraVENous, PRN, Abbey Dhillon PA    sodium chloride (NS) flush 5-40 mL, 5-40 mL, IntraVENous, Q8H, Argelia Patel MD, 10 mL at 01/11/20 0601    sodium chloride (NS) flush 5-40 mL, 5-40 mL, IntraVENous, PRN, Argelia Patel MD    ondansetron (ZOFRAN) injection 4 mg, 4 mg, IntraVENous, Q6H PRN, Argelia Patel MD    bisacodyl (DULCOLAX) tablet 5 mg, 5 mg, Oral, DAILY PRN, Heena Shelby MD    insulin lispro (HUMALOG) injection, , SubCUTAneous, AC&HS, Heena Shelby MD, Stopped at 01/09/20 2200      Assessment:     Principal Problem:    DEANNA (acute kidney injury) ([facility] [address] (1/8/2020)    Active Problems:    Hypertension (6/30/2009)      Hyperlipidemia (6/30/2009)      Coronary atherosclerosis of native coronary artery (7/23/2009)      Overview: 7/2009 - PCI of the RCA 3.5x33 and 3.5x18 Cypher      7/2009 - PCI of the LAD 2.5x18 Cypher, LCX 2.75x13 Cypher with kissing       POBA of small  OM1      06/2013:  Stress MPI with no ischemia and small inferior apical fixed       defect in patient with prior IMI      06/2013:  Normal echo      04/2014:  Stable CAD and FFR LAD 0.88      01/2018:  PCI mLAD for ISR - 2.5x28 Synergy                  DM2 (diabetes mellitus, type 2) ([facility] [address] (1/9/2015)      Anemia (1/10/2015)      Elevated liver enzymes (1/8/2020)      Non-traumatic rhabdomyolysis (1/9/2020)        Plan:     Acute kidney injury  With rhabdomyolysis  In the presence of elevated liver enzymes  And now is found to have elevated Lamda light chains. Likely secondary to medication causing liver toxicity and rhabdomyolysis. So far hepatitis panel is negative. Patient has metabolic acidosis now. Improved with bicarbonate. Kidney ultrasound consistent with some chronic medical changes. Free light chains are elevated. Oncology has been consulted. Pending intervention.      Diabetes mellitus type 2  Monitor blood sugar. Cover with insulin sliding scale accordingly. off metformin for now due to her recent poor kidney function.      Hypertension  Monitor blood pressure and manage accordingly. Avoid ACE inhibitor for now.      Hyperlipidemia  Avoid statin for now due to hepatitis. Avoid acetaminophen too.      We will hold aspirin, or Plavix for now in case she needs to have kidney biopsy.      I have discussed with patient regarding advance directive.  Patient would like to have a DNR status.       DVT prophylaxis : SCD       Signed By: Mu Trotter MD     January 11, 2020

## 2020-01-11 NOTE — PROGRESS NOTES
[facility] Hematology & Oncology        Inpatient Hematology / Oncology Consult Note    Reason for Consult:  DEANNA (acute kidney injury) [facility]) [N17.9]  Referring Physician:  Carlos Gonsalez MD    History of Present Illness:  Ms. Ramona Cyr is a 76 y.o. female that was sent to ER 1/8/2020 from her PCP for creatinine level of 6.78 and elevated LFTs. She went to see her PCP because of weakness for the past 2-3 weeks. She reported losing weight over the past month but could not quantify. She denied fevers, SOB, joint pain, rashes, nausea, vomiting or abdominal pain. She denies taking any OTC medications/herbs. She has PMH of DM, CAD post stent placement, hypertension, hyperlipidemia. She did not report long term anemia but it is apparent based off of her labs. She states that she had colonoscopy about a year ago that was normal. SPEP no M spike. FLC K/L ratio 2.66. CK >14,000. Nephrology has seen patient and feels that this could be from rhabdomyolysis possibly medication induced on statin (Crestor). We are consulted for recommnendations. 24 hours: lab showed low iron    Review of Systems:  Constitutional Denies fever, chills, +weight loss, +appetite changes, +fatigue   HEENT Denies trauma, blurry vision, hearing loss, ear pain, nosebleeds, sore throat, neck pain    Skin Denies lesions or rashes. Lungs Denies dyspnea, cough, sputum production or hemoptysis. Cardiovascular Denies chest pain, palpitations, or lower extremity edema. Gastrointestinal Denies nausea, vomiting, changes in bowel habits, bloody or black stools, abdominal pain.  Denies dysuria, frequency or hesitancy of urination. Neuro Denies headaches, visual changes or ataxia. Denies dizziness, tingling, tremors, sensory change, speech change, focal weakness or headaches. MSK Denies back pain, arthralgias, myalgias or frequent falls. Psychiatric/Behavioral The patient is not nervous/anxious.          Allergies   Allergen Reactions  Iodinated Contrast Media Anaphylaxis    Iothalamate Meglumine Anaphylaxis     Allergic to conray ivp dye    Adhesive Tape Other (comments)     Blisters - tolerates newer tapes without problems    Codeine Itching    Demerol [Meperidine] Swelling    Dilaudid [Hydromorphone (Bulk)] Other (comments)     hallucinate    Oxycodone Other (comments)     halucinations-- patient states it was oxycontin, not oxycodone     Past Medical History:   Diagnosis Date    Abnormal EKG 12/9/2015    CAD (coronary artery disease) 2009    MI & 4 stents    Chronic pain     left shoulder    Coagulation disorder (HCC)     anemia: pt reports after a surgery    Diabetes ([facility] [address] 2013    type 2; oral med    DJD (degenerative joint disease) 6/30/2009    DM2 (diabetes mellitus, type 2) ([facility] [address] 1/9/2015    Gastrointestinal disorder     GERD (gastroesophageal reflux disease)     controlled with med    Hypertension     well controlled with med    Nausea & vomiting     requests scopaolamine patch    Neuropathy     Other ill-defined conditions(799.89)     hyperlipidemia/dyslipidemia    Palpitations 12/9/2015    Unspecified adverse effect of anesthesia     pt reports she does not want nerve block with TSA; reports pain at injection site after previous block    Urinary tract infection 1/9/2015     Past Surgical History:   Procedure Laterality Date    CARDIAC SURG PROCEDURE UNLIST      stents x 4    HX APPENDECTOMY      hernia    HX GYN      hysterectomy    HX KNEE ARTHROSCOPY      total of 18 knee surgeries    HX ORTHOPAEDIC      reconstructive L knee    HX SHOULDER REPLACEMENT      right; rotator cuff x 2     Family History   Problem Relation Age of Onset    Arrhythmia Mother     Heart Attack Father     Heart Disease Father     Coronary Artery Disease Other         PREMATURE      Social History     Socioeconomic History    Marital status:      Spouse name: Not on file    Number of children: Not on file    Years of education: Not on file    Highest education level: Not on file   Occupational History    Not on file   Social Needs    Financial resource strain: Not on file    Food insecurity:     Worry: Not on file     Inability: Not on file    Transportation needs:     Medical: Not on file     Non-medical: Not on file   Tobacco Use    Smoking status: Never Smoker    Smokeless tobacco: Never Used   Substance and Sexual Activity    Alcohol use: No     Comment: rare    Drug use: No    Sexual activity: Not on file   Lifestyle    Physical activity:     Days per week: Not on file     Minutes per session: Not on file    Stress: Not on file   Relationships    Social connections:     Talks on phone: Not on file     Gets together: Not on file     Attends [employer] service: Not on file     Active member of club or organization: Not on file     Attends meetings of clubs or organizations: Not on file     Relationship status: Not on file    Intimate partner violence:     Fear of current or ex partner: Not on file     Emotionally abused: Not on file     Physically abused: Not on file     Forced sexual activity: Not on file   Other Topics Concern    Not on file   Social History Narrative    Not on file     Current Facility-Administered Medications   Medication Dose Route Frequency Provider Last Rate Last Dose    sodium bicarbonate (8.4%) 100 mEq in dextrose 5% 1,000 mL infusion   IntraVENous CONTINUOUS Ada García  mL/hr at 01/11/20 0825      sodium chloride (NS) flush 5-40 mL  5-40 mL IntraVENous PRN KAREL Covarrubias        sodium chloride (NS) flush 5-40 mL  5-40 mL IntraVENous Q8H Ada García MD   10 mL at 01/11/20 0601    sodium chloride (NS) flush 5-40 mL  5-40 mL IntraVENous PRN Ada García MD        ondansetron (ZOFRAN) injection 4 mg  4 mg IntraVENous Q6H PRN Ada García MD        bisacodyl (DULCOLAX) tablet 5 mg  5 mg Oral DAILY PRN Ada García MD  insulin lispro (HUMALOG) injection   SubCUTAneous AC&HS Melzia Novak MD   Stopped at 20 2200       OBJECTIVE:  Patient Vitals for the past 8 hrs:   BP Temp Pulse Resp SpO2   20 1223 157/82 97.8 °F (36.6 °C) 70 19 96 %   20 0739 153/88 98.1 °F (36.7 °C) 62 18 96 %     Temp (24hrs), Av.1 °F (36.7 °C), Min:97.5 °F (36.4 °C), Max:98.7 °F (37.1 °C)     07 -  1900  In: -   Out: 250 [Urine:250]    Physical Exam:  Constitutional: Well developed, well nourished female in no acute distress, sitting comfortably in the hospital bed. HEENT: Normocephalic and atraumatic. Oropharynx is clear, mucous membranes are moist.  Pupils are equal, round, and reactive to light. Extraocular muscles are intact. Sclerae anicteric. Neck supple without JVD. No thyromegaly present. Skin Warm and dry. No bruising and no rash noted. No erythema. No pallor. Respiratory Lungs are clear to auscultation bilaterally without wheezes, rales or rhonchi, normal air exchange without accessory muscle use. CVS Normal rate, regular rhythm and normal S1 and S2. No murmurs, gallops, or rubs. Abdomen Soft, nontender and nondistended, normoactive bowel sounds. No palpable mass. No hepatosplenomegaly. Neuro Grossly nonfocal with no obvious sensory or motor deficits. MSK Normal range of motion in general.  No edema and no tenderness. Psych Appropriate mood and affect.         Labs:    Recent Results (from the past 24 hour(s))   GLUCOSE, POC    Collection Time: 01/10/20  4:05 PM   Result Value Ref Range    Glucose (POC) 105 (H) 65 - 100 mg/dL   IRON    Collection Time: 01/10/20  8:56 PM   Result Value Ref Range    Iron 41 35 - 150 ug/dL   VITAMIN B12    Collection Time: 01/10/20  8:56 PM   Result Value Ref Range    Vitamin B12 314 193 - 986 pg/mL   FOLATE    Collection Time: 01/10/20  8:56 PM   Result Value Ref Range    Folate 19.8 (H) 3.1 - 17.5 ng/mL   TRANSFERRIN SATURATION    Collection Time: 01/10/20  8:56 PM   Result Value Ref Range    Iron 40 35 - 150 ug/dL    TIBC 284 250 - 450 ug/dL    Transferrin Saturation 14 (L) >20 %   FERRITIN    Collection Time: 01/10/20  8:56 PM   Result Value Ref Range    Ferritin 70 8 - 388 NG/ML   LD    Collection Time: 01/10/20  8:56 PM   Result Value Ref Range     (H) 110 - 210 U/L   RETICULOCYTE COUNT    Collection Time: 01/10/20  8:56 PM   Result Value Ref Range    Reticulocyte count 1.5 0.3 - 2.0 %    Absolute Retic Cnt. 0.0405 0.026 - 0.095 M/ul    Immature Retic Fraction 19.4 (H) 3.0 - 15.9 %    Retic Hgb Conc. 38 (H) 29 - 35 pg   BILIRUBIN, TOTAL    Collection Time: 01/10/20  8:56 PM   Result Value Ref Range    Bilirubin, total 0.5 0.2 - 1.1 MG/DL   MYOGLOBIN    Collection Time: 01/10/20  8:56 PM   Result Value Ref Range    Myoglobin >20,000 (H) 9 - 82 ng/mL   GLUCOSE, POC    Collection Time: 01/10/20  9:58 PM   Result Value Ref Range    Glucose (POC) 132 (H) 65 - 100 mg/dL   GLUCOSE, POC    Collection Time: 01/11/20  7:36 AM   Result Value Ref Range    Glucose (POC) 112 (H) 65 - 100 mg/dL   CBC WITH AUTOMATED DIFF    Collection Time: 01/11/20  7:43 AM   Result Value Ref Range    WBC 5.4 4.3 - 11.1 K/uL    RBC 2.82 (L) 4.05 - 5.2 M/uL    HGB 8.5 (L) 11.7 - 15.4 g/dL    HCT 24.9 (L) 35.8 - 46.3 %    MCV 88.3 79.6 - 97.8 FL    MCH 30.1 26.1 - 32.9 PG    MCHC 34.1 31.4 - 35.0 g/dL    RDW 17.5 (H) 11.9 - 14.6 %    PLATELET 175 235 - 104 K/uL    MPV 12.5 (H) 9.4 - 12.3 FL    ABSOLUTE NRBC 0.00 0.0 - 0.2 K/uL    NEUTROPHILS 59 43 - 78 %    LYMPHOCYTES 27 13 - 44 %    MONOCYTES 10 4.0 - 12.0 %    EOSINOPHILS 3 0.5 - 7.8 %    BASOPHILS 1 0.0 - 2.0 %    IMMATURE GRANULOCYTES 0 0.0 - 5.0 %    ABS. NEUTROPHILS 3.1 1.7 - 8.2 K/UL    ABS. LYMPHOCYTES 1.5 0.5 - 4.6 K/UL    ABS. MONOCYTES 0.5 0.1 - 1.3 K/UL    ABS. EOSINOPHILS 0.2 0.0 - 0.8 K/UL    ABS. BASOPHILS 0.1 0.0 - 0.2 K/UL    ABS. IMM.  GRANS. 0.0 0.0 - 0.5 K/UL    RBC COMMENTS NORMOCYTIC/NORMOCHROMIC      PLATELET COMMENTS ADEQUATE      DF AUTOMATED     METABOLIC PANEL, COMPREHENSIVE    Collection Time: 01/11/20  7:43 AM   Result Value Ref Range    Sodium 143 136 - 145 mmol/L    Potassium 3.6 3.5 - 5.1 mmol/L    Chloride 108 (H) 98 - 107 mmol/L    CO2 24 21 - 32 mmol/L    Anion gap 11 7 - 16 mmol/L    Glucose 106 (H) 65 - 100 mg/dL    BUN 62 (H) 8 - 23 MG/DL    Creatinine 7.35 (H) 0.6 - 1.0 MG/DL    GFR est AA 7 (L) >60 ml/min/1.73m2    GFR est non-AA 6 (L) >60 ml/min/1.73m2    Calcium 8.6 8.3 - 10.4 MG/DL    Bilirubin, total 0.5 0.2 - 1.1 MG/DL    ALT (SGPT) 288 (H) 12 - 65 U/L    AST (SGOT) 241 (H) 15 - 37 U/L    Alk.  phosphatase 45 (L) 50 - 136 U/L    Protein, total 5.8 (L) 6.3 - 8.2 g/dL    Albumin 2.4 (L) 3.2 - 4.6 g/dL    Globulin 3.4 2.3 - 3.5 g/dL    A-G Ratio 0.7 (L) 1.2 - 3.5     GLUCOSE, POC    Collection Time: 01/11/20 12:22 PM   Result Value Ref Range    Glucose (POC) 138 (H) 65 - 100 mg/dL       Imaging:  [unfilled]    ASSESSMENT:  Problem List  Date Reviewed: 7/30/2019          Codes Class Noted    CAD (coronary artery disease) ICD-10-CM: I25.10  ICD-9-CM: 414.00  2/8/2018        Non-traumatic rhabdomyolysis ICD-10-CM: M62.82  ICD-9-CM: 728.88  1/9/2020        * (Principal) DEANNA (acute kidney injury) ([facility] [address] ICD-10-CM: N17.9  ICD-9-CM: 584.9  1/8/2020        Elevated liver enzymes ICD-10-CM: R74.8  ICD-9-CM: 790.5  1/8/2020        Neuropathy (Chronic) ICD-10-CM: G62.9  ICD-9-CM: 355.9  4/6/2017        Palpitations ICD-10-CM: R00.2  ICD-9-CM: 785.1  12/9/2015        Abnormal EKG ICD-10-CM: R94.31  ICD-9-CM: 794.31  12/9/2015        Anemia ICD-10-CM: D64.9  ICD-9-CM: 285.9  1/10/2015        Anemia associated with acute blood loss ICD-10-CM: D62  ICD-9-CM: 285.1  1/10/2015        Other specified arthropathy, shoulder region ICD-10-CM: M12.819  ICD-9-CM: 716.81  1/9/2015        Nontraumatic rupture of tendons of biceps (long head) ICD-10-CM: B43.194  ICD-9-CM: 727.62  1/9/2015        DM2 (diabetes mellitus, type 2) [facility]) ICD-10-CM: E11.9  ICD-9-CM: 250.00  1/9/2015        Coronary atherosclerosis of native coronary artery (Chronic) ICD-10-CM: I25.10  ICD-9-CM: 414.01  7/23/2009    Overview Addendum 2/8/2018  1:13 PM by Nick Arredondo MD     7/2009 - PCI of the RCA 3.5x33 and 3.5x18 Cypher  7/2009 - PCI of the LAD 2.5x18 Cypher, LCX 2.75x13 Cypher with kissing POBA of small  OM1  06/2013:  Stress MPI with no ischemia and small inferior apical fixed defect in patient with prior IMI  06/2013:  Normal echo  04/2014:  Stable CAD and FFR LAD 0.88  01/2018:  PCI mLAD for ISR - 2.5x28 Synergy                 Hypertension (Chronic) ICD-10-CM: I10  ICD-9-CM: 401.9  6/30/2009        Hyperlipidemia (Chronic) ICD-10-CM: E78.5  ICD-9-CM: 272.4  6/30/2009        DJD (degenerative joint disease) (Chronic) ICD-10-CM: M19.90  ICD-9-CM: 715.90  6/30/2009        GERD (gastroesophageal reflux disease) (Chronic) ICD-10-CM: K21.9  ICD-9-CM: 530.81  6/30/2009                RECOMMENDATIONS:  DEANNA  -likely rhabdomyolysis with CK>14,000. Likely medication induced. Continue IV fluids. Nephrology following. Stopping Crestor, Cymbalta, ACE, metformin. Check myoglobin. Elevated LFTs  -Hepatitis panel negative. Improving since admission    Anemia  -Check iron studies, hemolysis labs    Wheelchair bound due to multiple knee surgeries      Lab studies and imaging studies were personally reviewed. Pertinent old records were reviewed. I personally saw, exammed and counselled the patient, and discussed with NP, agree with above history/assessment/plan. 76 y. o.female H/O CAD/HTN/DM developed weight loss and severe anemia, liver dysfunction and CPK [id number], DEANNA Cr 7, admitted as rhabdomyolysis, consulted for anemia and elevated FLC, check iron/B12/FA/hemolysis/SPEP/UPEP, found low iron and arranged Infed, reported overdue for colonoscopy, consult GI to follow in office, will follow the remaining labs. oHdan Senior M.D.   Roberto Soliman [address]  Office : [phone]  Fax : [phone]

## 2020-01-11 NOTE — PROGRESS NOTES
[facility]        Subjective: A vs CKD? No new complaints  Resting comfortably    Review of Systems -   General ROS: negative for - fever, chills  Respiratory ROS: no SOB, cough, SYED  Cardiovascular ROS: no CP, palpitations  Gastrointestinal ROS: no N&V, abdominal pain, diarrhea  Genito-Urinary ROS: no difficulty voiding, dysuria  Neurological ROS: no seizures, focal weekness        Objective:    Vitals:    01/10/20 2322 01/11/20 0408 01/11/20 0739 01/11/20 1223   BP: 133/83 123/71 153/88 157/82   Pulse: 72 68 62 70   Resp: 17 18 18 19   Temp: 98.7 °F (37.1 °C) 98.1 °F (36.7 °C) 98.1 °F (36.7 °C) 97.8 °F (36.6 °C)   SpO2: 97% 94% 96% 96%   Weight:       Height:           PE  Gen: in no acute distress  CV:reg rate  Chest:clear  Abd: soft  Ext/Access: no edema       . LAB  Recent Labs     01/11/20  0743 01/10/20  0454 01/09/20  0537   WBC 5.4 4.9 5.1   HGB 8.5* 7.6* 8.4*   HCT 24.9* 22.4* 25.7*    213 218     Recent Labs     01/11/20  0743 01/10/20  2056 01/10/20  0454 01/09/20  0537     --  139 143   K 3.6  --  3.6 3.7   *  --  108* 114*   CO2 24  --  21 17*   *  --  125* 87   BUN 62*  --  64* 68*   CREA 7.35*  --  7.07* 6.92*   CA 8.6  --  8.4 8.6   ALB 2.4*  --  2.4* 2.4*   TBILI 0.5 0.5 0.5 0.5   *  --  299* 323*   SGOT 241*  --  258* 337*           Radiology    A/P:   Patient Active Problem List   Diagnosis Code    Hypertension I10    Hyperlipidemia E78.5    DJD (degenerative joint disease) M19.90    GERD (gastroesophageal reflux disease) K21.9    Coronary atherosclerosis of native coronary artery I25.10    Other specified arthropathy, shoulder region M12.819    Nontraumatic rupture of tendons of biceps (long head) M66.329    DM2 (diabetes mellitus, type 2) (HCC) E11.9    Anemia D64.9    Anemia associated with acute blood loss D62    Palpitations R00.2    Abnormal EKG R94.31    Neuropathy G62.9    CAD (coronary artery disease) I25.10    DEANNA (acute kidney injury) ([facility] [address] N17.9    Elevated liver enzymes R74.8    Non-traumatic rhabdomyolysis M62.82       DEANNA vs CKD - workup in progress. Renal US shows normal size, but increased echogenicity bilat. Urine shws some proteinuria. Elevated K/L ratio noted, Onc consulted. Labs are slowly getting worse. No acute need for dialysis, but may need dialysis next week if no better.     Abnormal LFT's    HTN    Anemia    DM    DORITA Garcia MD

## 2020-01-12 LAB
ANION GAP SERPL CALC-SCNC: 8 MMOL/L (ref 7–16)
BUN SERPL-MCNC: 64 MG/DL (ref 8–23)
CALCIUM SERPL-MCNC: 8.9 MG/DL (ref 8.3–10.4)
CHLORIDE SERPL-SCNC: 101 MMOL/L (ref 98–107)
CO2 SERPL-SCNC: 30 MMOL/L (ref 21–32)
CREAT SERPL-MCNC: 8.02 MG/DL (ref 0.6–1)
GLUCOSE BLD STRIP.AUTO-MCNC: 136 MG/DL (ref 65–100)
GLUCOSE BLD STRIP.AUTO-MCNC: 136 MG/DL (ref 65–100)
GLUCOSE BLD STRIP.AUTO-MCNC: 92 MG/DL (ref 65–100)
GLUCOSE BLD STRIP.AUTO-MCNC: 98 MG/DL (ref 65–100)
GLUCOSE SERPL-MCNC: 130 MG/DL (ref 65–100)
POTASSIUM SERPL-SCNC: 3.6 MMOL/L (ref 3.5–5.1)
SODIUM SERPL-SCNC: 139 MMOL/L (ref 136–145)

## 2020-01-12 PROCEDURE — 80048 BASIC METABOLIC PNL TOTAL CA: CPT

## 2020-01-12 PROCEDURE — 74011000258 HC RX REV CODE- 258: Performed by: NURSE PRACTITIONER

## 2020-01-12 PROCEDURE — 99232 SBSQ HOSP IP/OBS MODERATE 35: CPT | Performed by: INTERNAL MEDICINE

## 2020-01-12 PROCEDURE — 77030020263 HC SOL INJ SOD CL0.9% LFCR 1000ML

## 2020-01-12 PROCEDURE — 74011250636 HC RX REV CODE- 250/636: Performed by: INTERNAL MEDICINE

## 2020-01-12 PROCEDURE — 65270000029 HC RM PRIVATE

## 2020-01-12 PROCEDURE — 74011250636 HC RX REV CODE- 250/636: Performed by: NURSE PRACTITIONER

## 2020-01-12 PROCEDURE — 74011250637 HC RX REV CODE- 250/637: Performed by: NURSE PRACTITIONER

## 2020-01-12 PROCEDURE — 36415 COLL VENOUS BLD VENIPUNCTURE: CPT

## 2020-01-12 PROCEDURE — 82962 GLUCOSE BLOOD TEST: CPT

## 2020-01-12 RX ORDER — SODIUM CHLORIDE 9 MG/ML
25 INJECTION, SOLUTION INTRAVENOUS CONTINUOUS
Status: DISCONTINUED | OUTPATIENT
Start: 2020-01-12 | End: 2020-01-16

## 2020-01-12 RX ADMIN — Medication 10 ML: at 05:25

## 2020-01-12 RX ADMIN — SODIUM CHLORIDE 75 ML/HR: 900 INJECTION, SOLUTION INTRAVENOUS at 23:57

## 2020-01-12 RX ADMIN — SODIUM CHLORIDE 125 MG: 900 INJECTION, SOLUTION INTRAVENOUS at 09:22

## 2020-01-12 RX ADMIN — FERROUS SULFATE TAB 325 MG (65 MG ELEMENTAL FE) 325 MG: 325 (65 FE) TAB at 08:53

## 2020-01-12 RX ADMIN — SODIUM CHLORIDE 75 ML/HR: 900 INJECTION, SOLUTION INTRAVENOUS at 10:00

## 2020-01-12 RX ADMIN — Medication 10 ML: at 21:20

## 2020-01-12 RX ADMIN — Medication 10 ML: at 14:00

## 2020-01-12 NOTE — PROGRESS NOTES
Pt complains of L flank pain this morning, rating pain 8/10. MSG to MD through perfect serve sent. MD recommended hot compress. Hot compress applied to pt L flank. Hourly rounds performed during this shift; all needs met at this time. Bed in low/locked position and call light, personal items within reach.

## 2020-01-12 NOTE — PROGRESS NOTES
[facility]        Inpatient Hematology / Oncology Progress note    Reason for Consult:  DEANNA (acute kidney injury) [facility]) [N17.9]  Referring Physician:  Tico Orlando MD    History of Present Illness:  Ms. Radha Eugene is a 76 y.o. female that was sent to ER 1/8/2020 from her PCP for creatinine level of 6.78 and elevated LFTs. She went to see her PCP because of weakness for the past 2-3 weeks. She reported losing weight over the past month but could not quantify. She denied fevers, SOB, joint pain, rashes, nausea, vomiting or abdominal pain. She denies taking any OTC medications/herbs. She has PMH of DM, CAD post stent placement, hypertension, hyperlipidemia. She did not report long term anemia but it is apparent based off of her labs. She states that she had colonoscopy about a year ago that was normal. SPEP no M spike. FLC K/L ratio 2.66. CK >14,000. Nephrology has seen patient and feels that this could be from rhabdomyolysis possibly medication induced on statin (Crestor). We are consulted for recommnendations. Constitutional: negative for fever, chills, +weakness, malaise, fatigue. CV: negative for chest pain, palpitations, edema. Respiratory: negative for dyspnea, cough, wheezing. GI: negative for nausea, abdominal pain, diarrhea. Review of Systems:  Constitutional Denies fever, chills, +weight loss, +appetite changes, +fatigue   HEENT Denies trauma, blurry vision, hearing loss, ear pain, nosebleeds, sore throat, neck pain    Skin Denies lesions or rashes. Lungs Denies dyspnea, cough, sputum production or hemoptysis. Cardiovascular Denies chest pain, palpitations, or lower extremity edema. Gastrointestinal Denies nausea, vomiting, changes in bowel habits, bloody or black stools, abdominal pain.  Denies dysuria, frequency or hesitancy of urination. Right flank pain. Neuro Denies headaches, visual changes or ataxia.  Denies dizziness, tingling, tremors, sensory change, speech change, focal weakness or headaches. MSK + back pain, arthralgias, myalgias     Psychiatric/Behavioral The patient is not nervous/anxious.          Allergies   Allergen Reactions    Iodinated Contrast Media Anaphylaxis    Iothalamate Meglumine Anaphylaxis     Allergic to conray ivp dye    Adhesive Tape Other (comments)     Blisters - tolerates newer tapes without problems    Codeine Itching    Demerol [Meperidine] Swelling    Dilaudid [Hydromorphone (Bulk)] Other (comments)     hallucinate    Oxycodone Other (comments)     halucinations-- patient states it was oxycontin, not oxycodone     Past Medical History:   Diagnosis Date    Abnormal EKG 12/9/2015    CAD (coronary artery disease) 2009    MI & 4 stents    Chronic pain     left shoulder    Coagulation disorder (HCC)     anemia: pt reports after a surgery    Diabetes ([facility] [address] 2013    type 2; oral med    DJD (degenerative joint disease) 6/30/2009    DM2 (diabetes mellitus, type 2) ([facility] [address] 1/9/2015    Gastrointestinal disorder     GERD (gastroesophageal reflux disease)     controlled with med    Hypertension     well controlled with med    Nausea & vomiting     requests scopaolamine patch    Neuropathy     Other ill-defined conditions(799.89)     hyperlipidemia/dyslipidemia    Palpitations 12/9/2015    Unspecified adverse effect of anesthesia     pt reports she does not want nerve block with TSA; reports pain at injection site after previous block    Urinary tract infection 1/9/2015     Past Surgical History:   Procedure Laterality Date    CARDIAC SURG PROCEDURE UNLIST      stents x 4    HX APPENDECTOMY      hernia    HX GYN      hysterectomy    HX KNEE ARTHROSCOPY      total of 18 knee surgeries    HX ORTHOPAEDIC      reconstructive L knee    HX SHOULDER REPLACEMENT      right; rotator cuff x 2     Family History   Problem Relation Age of Onset    Arrhythmia Mother     Heart Attack Father     Heart Disease Father    Rafa Bender Coronary Artery Disease Other         PREMATURE      Social History     Socioeconomic History    Marital status:      Spouse name: Not on file    Number of children: Not on file    Years of education: Not on file    Highest education level: Not on file   Occupational History    Not on file   Social Needs    Financial resource strain: Not on file    Food insecurity:     Worry: Not on file     Inability: Not on file    Transportation needs:     Medical: Not on file     Non-medical: Not on file   Tobacco Use    Smoking status: Never Smoker    Smokeless tobacco: Never Used   Substance and Sexual Activity    Alcohol use: No     Comment: rare    Drug use: No    Sexual activity: Not on file   Lifestyle    Physical activity:     Days per week: Not on file     Minutes per session: Not on file    Stress: Not on file   Relationships    Social connections:     Talks on phone: Not on file     Gets together: Not on file     Attends [employer] service: Not on file     Active member of club or organization: Not on file     Attends meetings of clubs or organizations: Not on file     Relationship status: Not on file    Intimate partner violence:     Fear of current or ex partner: Not on file     Emotionally abused: Not on file     Physically abused: Not on file     Forced sexual activity: Not on file   Other Topics Concern    Not on file   Social History Narrative    Not on file     Current Facility-Administered Medications   Medication Dose Route Frequency Provider Last Rate Last Dose    0.9% sodium chloride infusion  75 mL/hr IntraVENous CONTINUOUS Argelia Patel MD 75 mL/hr at 01/12/20 1000 75 mL/hr at 01/12/20 1000    ferrous sulfate tablet 325 mg  1 Tab Oral DAILY WITH Nikos Sharp NP   325 mg at 01/12/20 0853    ferric gluconate (FERRLECIT) 125 mg in 0.9% sodium chloride 100 mL IVPB  125 mg IntraVENous DAILY Melany Garcia NP   125 mg at 01/12/20 5184    sodium chloride (NS) flush 5-40 mL  5-40 mL IntraVENous PRN KAREL Yepez        sodium chloride (NS) flush 5-40 mL  5-40 mL IntraVENous Q8H Carlos Gonsalez MD   10 mL at 20 0525    sodium chloride (NS) flush 5-40 mL  5-40 mL IntraVENous PRN Argelia Patel MD        ondansetron (ZOFRAN) injection 4 mg  4 mg IntraVENous Q6H PRN Argelia Patel MD        bisacodyl (DULCOLAX) tablet 5 mg  5 mg Oral DAILY PRN Argelia Patel MD        insulin lispro (HUMALOG) injection   SubCUTAneous AC&HS Carlos Gonsalez MD   Stopped at 20 0730       OBJECTIVE:  Patient Vitals for the past 8 hrs:   BP Temp Pulse Resp SpO2   20 0730 149/81 98.9 °F (37.2 °C) 63 18 92 %   20 0434 136/74 99 °F (37.2 °C) 78 18 92 %     Temp (24hrs), Av.4 °F (36.9 °C), Min:97.7 °F (36.5 °C), Max:99 °F (37.2 °C)    No intake/output data recorded. Physical Exam:  Constitutional: Well developed, well nourished female in no acute distress, sitting comfortably in the hospital bed. HEENT: Normocephalic and atraumatic. Oropharynx is clear, mucous membranes are moist.  Pupils are equal, round, and reactive to light. Extraocular muscles are intact. Sclerae anicteric. Neck supple without JVD. No thyromegaly present. Skin Warm and dry. No bruising and no rash noted. No erythema. No pallor. Respiratory Lungs are clear to auscultation bilaterally without wheezes, rales or rhonchi, normal air exchange without accessory muscle use. CVS Normal rate, regular rhythm and normal S1 and S2. No murmurs, gallops, or rubs. Abdomen Soft, nontender and nondistended, normoactive bowel sounds. No palpable mass. No hepatosplenomegaly. Neuro Grossly nonfocal with no obvious sensory or motor deficits. MSK Normal range of motion in general.  No edema and no tenderness. Psych Appropriate mood and affect.         Labs:    Recent Results (from the past 24 hour(s))   GLUCOSE, POC    Collection Time: 20 12:22 PM Result Value Ref Range    Glucose (POC) 138 (H) 65 - 100 mg/dL   GLUCOSE, POC    Collection Time: 01/11/20  3:48 PM   Result Value Ref Range    Glucose (POC) 165 (H) 65 - 100 mg/dL   GLUCOSE, POC    Collection Time: 01/11/20  9:28 PM   Result Value Ref Range    Glucose (POC) 159 (H) 65 - 938 mg/dL   METABOLIC PANEL, BASIC    Collection Time: 01/12/20  7:07 AM   Result Value Ref Range    Sodium 139 136 - 145 mmol/L    Potassium 3.6 3.5 - 5.1 mmol/L    Chloride 101 98 - 107 mmol/L    CO2 30 21 - 32 mmol/L    Anion gap 8 7 - 16 mmol/L    Glucose 130 (H) 65 - 100 mg/dL    BUN 64 (H) 8 - 23 MG/DL    Creatinine 8.02 (H) 0.6 - 1.0 MG/DL    GFR est AA 6 (L) >60 ml/min/1.73m2    GFR est non-AA 5 (L) >60 ml/min/1.73m2    Calcium 8.9 8.3 - 10.4 MG/DL   GLUCOSE, POC    Collection Time: 01/12/20  7:34 AM   Result Value Ref Range    Glucose (POC) 136 (H) 65 - 100 mg/dL       Imaging:  [unfilled]    ASSESSMENT:  Problem List  Date Reviewed: 7/30/2019          Codes Class Noted    CAD (coronary artery disease) ICD-10-CM: I25.10  ICD-9-CM: 414.00  2/8/2018        Non-traumatic rhabdomyolysis ICD-10-CM: M62.82  ICD-9-CM: 728.88  1/9/2020        * (Principal) DEANNA (acute kidney injury) ([facility] [address] ICD-10-CM: N17.9  ICD-9-CM: 584.9  1/8/2020        Elevated liver enzymes ICD-10-CM: R74.8  ICD-9-CM: 790.5  1/8/2020        Neuropathy (Chronic) ICD-10-CM: G62.9  ICD-9-CM: 355.9  4/6/2017        Palpitations ICD-10-CM: R00.2  ICD-9-CM: 785.1  12/9/2015        Abnormal EKG ICD-10-CM: R94.31  ICD-9-CM: 794.31  12/9/2015        Anemia ICD-10-CM: D64.9  ICD-9-CM: 285.9  1/10/2015        Anemia associated with acute blood loss ICD-10-CM: D62  ICD-9-CM: 285.1  1/10/2015        Other specified arthropathy, shoulder region ICD-10-CM: M12.819  ICD-9-CM: 716.81  1/9/2015        Nontraumatic rupture of tendons of biceps (long head) ICD-10-CM: M46.754  ICD-9-CM: 727.62  1/9/2015        DM2 (diabetes mellitus, type 2) (HCC) ICD-10-CM: E11.9  ICD-9-CM: 250.00 1/9/2015        Coronary atherosclerosis of native coronary artery (Chronic) ICD-10-CM: I25.10  ICD-9-CM: 414.01  7/23/2009    Overview Addendum 2/8/2018  1:13 PM by Josué Moseley MD     7/2009 - PCI of the RCA 3.5x33 and 3.5x18 Cypher  7/2009 - PCI of the LAD 2.5x18 Cypher, LCX 2.75x13 Cypher with kissing POBA of small  OM1  06/2013:  Stress MPI with no ischemia and small inferior apical fixed defect in patient with prior IMI  06/2013:  Normal echo  04/2014:  Stable CAD and FFR LAD 0.88  01/2018:  PCI mLAD for ISR - 2.5x28 [facility]nergy                 Hypertension (Chronic) ICD-10-CM: I10  ICD-9-CM: 401.9  6/30/2009        Hyperlipidemia (Chronic) ICD-10-CM: E78.5  ICD-9-CM: 272.4  6/30/2009        DJD (degenerative joint disease) (Chronic) ICD-10-CM: M19.90  ICD-9-CM: 715.90  6/30/2009        GERD (gastroesophageal reflux disease) (Chronic) ICD-10-CM: K21.9  ICD-9-CM: 530.81  6/30/2009                RECOMMENDATIONS:  DEANNA  -likely rhabdomyolysis with CK>14,000. Likely medication induced. Continue IV fluids. Nephrology following. Stopping Crestor, Cymbalta, ACE, metformin. Check myoglobin. 1/12 Creatinine continues to worsen. Nephology following. Elevated LFTs  -Hepatitis panel negative. Improving since admission  1/12 not checked today    Anemia  -Check iron studies, hemolysis labs  1/12 CORTEZ hgb improved after one dose IV iron to 8.5. receiving 2nd dose today. Also on oral iron. GI saw patient this am. She has not been off of plavix long enough for procedures. Depending on LOS will determine OP vs inpatient workup. CAD post stent placement-plavix on hold    Wheelchair bound due to multiple knee surgeries      Lab studies and imaging studies were personally reviewed. Pertinent old records were reviewed.           Joshua Roberts NP    [address]  Office : [phone]  Fax : [phone]  I personally saw, exammed and counselled the patient, and discussed with NP, agree with above history/assessment/plan. 76 y. o.female H/O CAD/HTN/DM developed weight loss and severe anemia, liver dysfunction and CPK [address]7981, DEANNA Cr [address], admitted as rhabdomyolysis, consulted for anemia and elevated FLC, check iron/B12/FA/hemolysis/SPEP/UPEP, no M spike, found low iron and arranged Infed, reported overdue for colonoscopy, consult GI to follow in office, will sign off, please call for questions.       Parker Young M.D.   [address]  Office : [phone]  Fax : [phone]

## 2020-01-12 NOTE — CONSULTS
[facility] Consult Note       Primary GI Physician: Dr. Viviane Yoo Physician: Dr. Carrie Rivera    Referring Provider:  Tamy Camarillo NP    Consult Date:  1/12/2020    Admit Date:  1/8/2020    Chief Complaint:  Anemia    Subjective:     History of Present Illness:  Patient is a 76 y.o. female being seen in GI consultation at the request of Tamy Camarillo NP for anemia. She appears to have had anemia for quite some time. Labs on admission revealed low HGb 9.2 with normal MCV though with low iron studies. HGb did drop to as low as 7.6 though is now stable at Hgb 8.5 without transfusion pRBC. Hematology has evaluated and arranged for IV iron with Infed and GI was consulted given CORTEZ in pt due for colonoscopy. She denies overt GI bleeding. She reports regular bowel patterns. She denies abdominal pain, N/V, reflux/heartburn, dysphagia. Her appetite has been stable though despite this she has noticed that lately close appears to be more loose fitting. Her PMH is significant for HTN, HLD, DM II, CAD s/p MI and cardiac stents x4. She is on Plavix daily and reports last dose was taken on the night prior to this admission 1/8. She denies use of NSAIDs or other blood thinners. She denies use of tobacco or ETOH. She had an EGD 11/19/14 for atypical chest pain showed hiatal hernia. Colonoscopy 7/5/08 for GI bleed revealed diverticulosis and was otherwise normal exam to terminal ileum. She was seen in our office 5/2016 to discuss colon cancer screening though then due to cardiac history, debility, pt voiced desire to avoid endoscopic evaluation at that time. Her PMH is also notable for a history of DJD, multiple knee surgeries, debility confined to a wheelchair.   She was admitted 1/8 for suspected rhabdomyolysis (possibly medication induced on statin, Crestor) after she presented for elevated Creatinine of 6.78, elevated LFTs, CK >14,000 and recent symptoms of weakness and weight loss.  Nephrology is following. Medications to include at least her Crestor, Cymbalta, ACE, Metformin were discontinued. Hepatitis panel is negative. PMH:  Past Medical History:   Diagnosis Date    Abnormal EKG 12/9/2015    CAD (coronary artery disease) 2009    MI & 4 stents    Chronic pain     left shoulder    Coagulation disorder (HCC)     anemia: pt reports after a surgery    Diabetes ([facility] 2013    type 2; oral med    DJD (degenerative joint disease) 6/30/2009    DM2 (diabetes mellitus, type 2) ([facility] [address] 1/9/2015    Gastrointestinal disorder     GERD (gastroesophageal reflux disease)     controlled with med    Hypertension     well controlled with med    Nausea & vomiting     requests scopaolamine patch    Neuropathy     Other ill-defined conditions(799.89)     hyperlipidemia/dyslipidemia    Palpitations 12/9/2015    Unspecified adverse effect of anesthesia     pt reports she does not want nerve block with TSA; reports pain at injection site after previous block    Urinary tract infection 1/9/2015       PSH:  Past Surgical History:   Procedure Laterality Date    CARDIAC SURG PROCEDURE UNLIST      stents x 4    HX APPENDECTOMY      hernia    HX GYN      hysterectomy    HX KNEE ARTHROSCOPY      total of 18 knee surgeries    HX ORTHOPAEDIC      reconstructive L knee    HX SHOULDER REPLACEMENT      right; rotator cuff x 2       Allergies: Allergies   Allergen Reactions    Iodinated Contrast Media Anaphylaxis    Iothalamate Meglumine Anaphylaxis     Allergic to conray ivp dye    Adhesive Tape Other (comments)     Blisters - tolerates newer tapes without problems    Codeine Itching    Demerol [Meperidine] Swelling    Dilaudid [Hydromorphone (Bulk)] Other (comments)     hallucinate    Oxycodone Other (comments)     halucinations-- patient states it was oxycontin, not oxycodone       Home Medications:  Prior to Admission medications    Medication Sig Start Date End Date Taking?  Authorizing Provider   rosuvastatin (CRESTOR) 40 mg tablet TAKE 1 TAB BY MOUTH NIGHTLY. 11/13/19   Jass Young MD   clopidogrel (PLAVIX) 75 mg tab Take 1 Tab by mouth daily. 10/15/19   Jass Young MD   polyethylene glycol (MIRALAX) 17 gram/dose powder Take 17 g by mouth daily. Provider, Historical   nitroglycerin (NITROSTAT) 0.4 mg SL tablet 1 Tab by SubLINGual route as needed for Chest Pain for up to 3 doses. 7/25/19   Jass Young MD   lisinopril (PRINIVIL, ZESTRIL) 10 mg tablet Take 10 mg by mouth daily. Indications: HYPERTENSION    Provider, Historical   aspirin delayed-release 81 mg tablet Take 81 mg by mouth nightly. Provider, Historical   METFORMIN HCL (METFORMIN PO) Take 500 mg by mouth two (2) times a day. Provider, Historical   multivitamin with iron (DAILY MULTI-VITAMINS/IRON) tablet Take 1 Tab by mouth daily. Provider, Historical   DULOXETINE HCL (CYMBALTA PO) Take 60 mg by mouth nightly. Indications: neuropathy    Claudia, MD Marychuy   VITAMIN B-12 IJ by Injection route every thirty (30) days. Provider, Historical   NEXIUM 40 mg capsule Take 40 mg by mouth Every morning (before breakfast). Indications: GERD    Marychuy Taylor MD   LYRICA PO Take 150 mg by mouth two (2) times a day.  Indications: neuropathy    Claudia, MD Marychuy       Hospital Medications:  Current Facility-Administered Medications   Medication Dose Route Frequency    ferrous sulfate tablet 325 mg  1 Tab Oral DAILY WITH BREAKFAST    ferric gluconate (FERRLECIT) 125 mg in 0.9% sodium chloride 100 mL IVPB  125 mg IntraVENous DAILY    sodium bicarbonate (8.4%) 100 mEq in dextrose 5% 1,000 mL infusion   IntraVENous CONTINUOUS    sodium chloride (NS) flush 5-40 mL  5-40 mL IntraVENous PRN    sodium chloride (NS) flush 5-40 mL  5-40 mL IntraVENous Q8H    sodium chloride (NS) flush 5-40 mL  5-40 mL IntraVENous PRN    ondansetron (ZOFRAN) injection 4 mg  4 mg IntraVENous Q6H PRN    bisacodyl (DULCOLAX) tablet 5 mg  5 mg Oral DAILY PRN    insulin lispro (HUMALOG) injection   SubCUTAneous AC&HS       Social History:  Social History     Tobacco Use    Smoking status: Never Smoker    Smokeless tobacco: Never Used   Substance Use Topics    Alcohol use: No     Comment: rare       Family History:  Family History   Problem Relation Age of Onset    Arrhythmia Mother     Heart Attack Father     Heart Disease Father     Coronary Artery Disease Other         PREMATURE        Review of Systems:  A detailed 10 system ROS is obtained, with pertinent positives as listed above. All others are negative. Diet:  Diabetic consistent carb regular    Objective:     Physical Exam:  Vitals:  Visit Vitals  /81   Pulse 63   Temp 98.9 °F (37.2 °C)   Resp 18   Ht 5' 6\" (1.676 m)   Wt 85.3 kg (188 lb)   SpO2 92%   BMI 30.34 kg/m²     Gen:  Pt is alert, cooperative, no acute distress  Skin:  Face reveal no rashes. HEENT: Sclerae anicteric. Cardiovascular: Regular rate and rhythm. Respiratory:  Comfortable breathing with no accessory muscle use. Clear breath sounds anteriorly with no wheezes, rales, or rhonchi. GI:  Abdomen nondistended, soft, and Nontender. Normal active bowel sounds. No obvious masses palpable. Rectal:  Deferred    Neurological:  Gross memory appears intact. Patient is alert and oriented. Psychiatric:  Mood appears appropriate with judgement intact.     Laboratory:    Recent Labs     01/12/20  0707 01/11/20  0743 01/10/20  2056 01/10/20  0454 01/09/20  1046   WBC  --  5.4  --  4.9  --    HGB  --  8.5*  --  7.6*  --    HCT  --  24.9*  --  22.4*  --    PLT  --  218  --  213  --    MCV  --  88.3  --  88.2  --     143  --  139  --    K 3.6 3.6  --  3.6  --     108*  --  108*  --    CO2 30 24  --  21  --    BUN 64* 62*  --  64*  --    CREA 8.02* 7.35*  --  7.07*  --    CA 8.9 8.6  --  8.4  --    * 106*  --  125*  --    AP  --  45*  --  43*  --    SGOT  --  241*  --  258*  --    ALT  --  288*  --  299* --    TBILI  --  0.5 0.5 0.5  --    ALB  --  2.4*  --  2.4*  --    TP  --  5.8*  --  5.7* 5.8*          Assessment:     Principal Problem:    DEANNA (acute kidney injury) ([facility] (1/8/2020)    Active Problems:    Hypertension (6/30/2009)      Hyperlipidemia (6/30/2009)      Coronary atherosclerosis of native coronary artery (7/23/2009)      Overview: 7/2009 - PCI of the RCA 3.5x33 and 3.5x18 Cypher      7/2009 - PCI of the LAD 2.5x18 Cypher, LCX 2.75x13 Cypher with kissing       POBA of small  OM1      06/2013:  Stress MPI with no ischemia and small inferior apical fixed       defect in patient with prior IMI      06/2013:  Normal echo      04/2014:  Stable CAD and FFR LAD 0.88      01/2018:  PCI mLAD for ISR - 2.5x28 Synergy                  DM2 (diabetes mellitus, type 2) ([facility] (1/9/2015)      Anemia (1/10/2015)      Elevated liver enzymes (1/8/2020)      Non-traumatic rhabdomyolysis (1/9/2020)      76 y.o. female with PMH  DJD, multiple knee surgeries, debility confined to a wheelchair, HTN, HLD, DM II, CAD s/p MI and cardiac stents x4 (on Plavix, held since admit per pt), admitted 1/8 with suspected Rhabdo, DEANNA, being seen in GI consultation at the request of Kath Mcneil NP for anemia. She appears to have had anemia for quite some time. Labs on admission revealed low HGb 9.2 with normal MCV though with low iron studies. HGb did drop to as low as 7.6 though is now stable at Hgb 8.5 without transfusion pRBC. Hematology has evaluated and arranged for IV iron with Infed and GI was consulted given CORTEZ in pt due for colonoscopy. No overt GI bleeding. No GI complaints in general other than c/o more loose fitting clothing. No NSAIDs or other blood thinners. No tobacco or ETOH. She had an EGD 11/19/14 for atypical chest pain that showed a hiatal hernia. Colonoscopy 7/5/08 for GI bleed revealed diverticulosis and was otherwise normal exam to terminal ileum.   She was seen in our office 5/2016 to discuss colon cancer screening though then due to cardiac history, debility, pt voiced desire to avoid endoscopic evaluation at that time. Plan:     -Agree with po and IV iron supplementation. Follow hematology recommendations.  -Follow H/H and consider transfusion pRBC if needed  -She is due for Colon cancer screening given last colon exam was completed more than 10yrs ago in 2008 but would also benefit from Colonoscopy along with EGD in setting of CORTEZ. Will consider inpatient vs. Outpatient procedures. Given debility would likely need to be done in hospital setting. Would need to be off of Plavix for a total of 5days prior to these procedures. Per pt last dose was taken night before admission 1/8 so tomorrow should be day #5 off of Plavix. -Will follow.       Alexandra Hernandez PA-C  [facility]

## 2020-01-12 NOTE — PROGRESS NOTES
Progress Note    Patient: Judith Pimentel MRN: [id number]  SSN: [id number]    YOB: 1945  Age: 76 y.o. Sex: female      Admit Date: 1/8/2020    LOS: 4 days     Subjective:     Patient came to ER because of abnormal lab test from her primary doctor's office.      Patient has been feeling weak for the past 2 or 3 weeks. She went to see her primary doctor. Part of the work-up she was found to have markedly elevated creatinine and liver enzymes she was advised to come to emergency room for evaluation.     Patient reports losing weight for the past months. She could not tell me how much she lost in terms of her weight but she knew that she must have lost weight. Appetite is poor. No fever. No shortness of breath. No swelling. She thinks her urine output is decreased as well. No joint pain. No skin rashes. She denies nausea vomiting. No abdominal pain. No blood in urine. No blood in her stool. No hematemesis. No hemoptysis.      She also denies sinus symptoms. Dened headache. Patient denies taking over-the-counter medications long-term. No herbal tea.      Denied acutely ill from acute illness. No chronic diarrhea.      In the emergency room she was found to have significant elevation of creatinine, and liver enzymes.      Past medical history significant for diabetes mellitus, CAD post stent placement hypertension, , hyperlipidemia. Patient was found to have elevated creatinine kinase. She was hydrated. Creatinine continues to get worse. Lamda light chains are elevated. Oncologist was consulted. Patient reports having good amount of urine. She reports having adequate oral fluid intake.      Objective:     Vitals:    01/11/20 1929 01/11/20 2354 01/12/20 0434 01/12/20 0730   BP: 139/75 149/75 136/74 149/81   Pulse: 69 69 78 63   Resp: 19 18 18 18   Temp: 98.5 °F (36.9 °C) 98.6 °F (37 °C) 99 °F (37.2 °C) 98.9 °F (37.2 °C)   SpO2: 96% 96% 92% 92%   Weight: Height:            Intake and Output:  Current Shift: No intake/output data recorded. Last three shifts: 01/10 1901 - 01/12 0700  In: 240 [P.O.:240]  Out: 575 [Urine:575]    Physical Exam:     General:                    The patient is a pleasant female in no acute distress.  Patient has been feeling weak. Not much appetite. WKYD:                                   OVUMURYIPGSGX/[id number]. Eyes:                                   palpebral pallor, no scleral icterus. ENT:                                    External auricular and nasal exam within normal limits.                                             VQIYBS membranes are moist.  Neck:                                   Supple, non-tender, no JVD. Lungs:                       Clear to auscultation bilaterally without wheezes or crackles.                                             No respiratory distress or accessory muscle use. Heart:                                  Regular rate and rhythm, without murmurs, rubs, or gallops. Abdomen:                  Soft, non-tender, non-distended with normoactive bowel sounds. Genitourinary:           No tenderness over the bladder or bilateral CVAs. Extremities:               Without clubbing, cyanosis, or edema. Skin:                                    Normal color, texture, and turgor. No rashes, lesions, or jaundice. Pulses:                      Radial and dorsalis pedis pulses present 2+ bilaterally.                                               Capillary refill <2s. Neurologic:                CN II-XII grossly intact and symmetrical.                                               Moving all four extremities well with no focal deficits. Psychiatric:                Pleasant demeanor, appropriate affect.  Alert and oriented x 3    Lab/Data Review:    Recent Results (from the past 24 hour(s))   GLUCOSE, POC    Collection Time: 01/11/20 12:22 PM   Result Value Ref Range    Glucose (POC) 138 (H) 65 - 100 mg/dL   GLUCOSE, POC    Collection Time: 01/11/20  3:48 PM   Result Value Ref Range    Glucose (POC) 165 (H) 65 - 100 mg/dL   GLUCOSE, POC    Collection Time: 01/11/20  9:28 PM   Result Value Ref Range    Glucose (POC) 159 (H) 65 - 107 mg/dL   METABOLIC PANEL, BASIC    Collection Time: 01/12/20  7:07 AM   Result Value Ref Range    Sodium 139 136 - 145 mmol/L    Potassium 3.6 3.5 - 5.1 mmol/L    Chloride 101 98 - 107 mmol/L    CO2 30 21 - 32 mmol/L    Anion gap 8 7 - 16 mmol/L    Glucose 130 (H) 65 - 100 mg/dL    BUN 64 (H) 8 - 23 MG/DL    Creatinine 8.02 (H) 0.6 - 1.0 MG/DL    GFR est AA 6 (L) >60 ml/min/1.73m2    GFR est non-AA 5 (L) >60 ml/min/1.73m2    Calcium 8.9 8.3 - 10.4 MG/DL   GLUCOSE, POC    Collection Time: 01/12/20  7:34 AM   Result Value Ref Range    Glucose (POC) 136 (H) 65 - 100 mg/dL     US retroperitoneum  1-9-2020    IMPRESSION:     1. Diffusely increased renal cortical echogenicity bilaterally, most likely  related to medical renal disease.     2. No evidence of obstructive uropathy.     3. 0.4 cm right renal calculus.       Current Facility-Administered Medications:     ferrous sulfate tablet 325 mg, 1 Tab, Oral, DAILY WITH BREAKFAST, Kayla Fonseca NP, 325 mg at 01/12/20 0853    ferric gluconate (FERRLECIT) 125 mg in 0.9% sodium chloride 100 mL IVPB, 125 mg, IntraVENous, DAILY, Kayla Fonseca NP, 125 mg at 01/11/20 1729    sodium bicarbonate (8.4%) 100 mEq in dextrose 5% 1,000 mL infusion, , IntraVENous, CONTINUOUS, Argelia Patel MD, Last Rate: 125 mL/hr at 01/11/20 1728    sodium chloride (NS) flush 5-40 mL, 5-40 mL, IntraVENous, PRN, Beatrice Dhillon PA    sodium chloride (NS) flush 5-40 mL, 5-40 mL, IntraVENous, Q8H, Argelia Patel MD, 10 mL at 01/12/20 0525    sodium chloride (NS) flush 5-40 mL, 5-40 mL, IntraVENous, PRN, Argelia Patel MD    ondansetron (ZOFRAN) injection 4 mg, 4 mg, IntraVENous, Q6H PRN, Argelia Patel MD    bisacodyl (DULCOLAX) tablet 5 mg, 5 mg, Oral, DAILY PRN, Beth Barrett MD    insulin lispro (HUMALOG) injection, , SubCUTAneous, AC&HS, Beth Barrett MD, Stopped at 01/12/20 0730      Assessment:     Principal Problem:    DEANNA (acute kidney injury) ([facility] (1/8/2020)    Active Problems:    Hypertension (6/30/2009)      Hyperlipidemia (6/30/2009)      Coronary atherosclerosis of native coronary artery (7/23/2009)      Overview: 7/2009 - PCI of the RCA 3.5x33 and 3.5x18 Cypher      7/2009 - PCI of the LAD 2.5x18 Cypher, LCX 2.75x13 Cypher with kissing       POBA of small  OM1      06/2013:  Stress MPI with no ischemia and small inferior apical fixed       defect in patient with prior IMI      06/2013:  Normal echo      04/2014:  Stable CAD and FFR LAD 0.88      01/2018:  PCI mLAD for ISR - 2.5x28 Synergy                  DM2 (diabetes mellitus, type 2) ([facility] (1/9/2015)      Anemia (1/10/2015)      Elevated liver enzymes (1/8/2020)      Non-traumatic rhabdomyolysis (1/9/2020)        Plan:     Acute kidney injury  With rhabdomyolysis  In the presence of elevated liver enzymes  And now is found to have elevated Lamda light chains. Likely secondary to medication statin causing liver toxicity and rhabdomyolysis. So far hepatitis panel is negative. Patient has metabolic acidosis now. Improved with bicarbonate. Bicarbonate is already improved, will cut back on bicarbonate supplementation. Kidney ultrasound consistent with some chronic medical changes. Free light chains are elevated. Oncology has been consulted. Pending intervention further. So far her renal failure is nonoliguric.      Diabetes mellitus type 2  Monitor blood sugar. Cover with insulin sliding scale accordingly. off metformin for now due to her recent poor kidney function.      Hypertension  Monitor blood pressure and manage accordingly. Avoid ACE inhibitor for now.      Hyperlipidemia  Avoid statin for now due to hepatitis. Avoid acetaminophen too.    We will hold aspirin, or Plavix for now in case she needs to have kidney biopsy.      I have discussed with patient regarding advance directive.  Patient would like to have a DNR status.       DVT prophylaxis : SCD       Signed By: Christiano Sloan MD     January 12, 2020

## 2020-01-12 NOTE — PROGRESS NOTES
[facility]        Subjective: A vs CKD? Resting comfortably  Only complaint is back pain     Review of Systems -   General ROS: negative for - fever, chills  Respiratory ROS: no SOB, cough, SYED  Cardiovascular ROS: no CP, palpitations  Gastrointestinal ROS: no N&V, abdominal pain, diarrhea  Genito-Urinary ROS: no difficulty voiding, dysuria  Neurological ROS: no seizures, focal weekness        Objective:    Vitals:    01/11/20 1929 01/11/20 2354 01/12/20 0434 01/12/20 0730   BP: 139/75 149/75 136/74 149/81   Pulse: 69 69 78 63   Resp: 19 18 18 18   Temp: 98.5 °F (36.9 °C) 98.6 °F (37 °C) 99 °F (37.2 °C) 98.9 °F (37.2 °C)   SpO2: 96% 96% 92% 92%   Weight:       Height:           PE  Gen: in no acute distress  CV:reg rate  Chest:clear  Abd: soft  Ext/Access: no edema       . LAB  Recent Labs     01/11/20  0743 01/10/20  0454   WBC 5.4 4.9   HGB 8.5* 7.6*   HCT 24.9* 22.4*    213     Recent Labs     01/12/20  0707 01/11/20  0743 01/10/20  2056 01/10/20  0454    143  --  139   K 3.6 3.6  --  3.6    108*  --  108*   CO2 30 24  --  21   * 106*  --  125*   BUN 64* 62*  --  64*   CREA 8.02* 7.35*  --  7.07*   CA 8.9 8.6  --  8.4   ALB  --  2.4*  --  2.4*   TBILI  --  0.5 0.5 0.5   ALT  --  288*  --  299*   SGOT  --  241*  --  258*           Radiology    A/P:   Patient Active Problem List   Diagnosis Code    Hypertension I10    Hyperlipidemia E78.5    DJD (degenerative joint disease) M19.90    GERD (gastroesophageal reflux disease) K21.9    Coronary atherosclerosis of native coronary artery I25.10    Other specified arthropathy, shoulder region M12.819    Nontraumatic rupture of tendons of biceps (long head) M66.329    DM2 (diabetes mellitus, type 2) ([facility]) E11.9    Anemia D64.9    Anemia associated with acute blood loss D62    Palpitations R00.2    Abnormal EKG R94.31    Neuropathy G62.9    CAD (coronary artery disease) I25.10    DEANNA (acute kidney injury) ([facility]) N17.9    Elevated liver enzymes R74.8    Non-traumatic rhabdomyolysis M62.82       EDANNA vs CKD -  Labs are slowly getting worse. No acute need for dialysis, but may need dialysis next week if no better. Discussed with pt.     Abnormal LFT's    HTN    Anemia    DM    DORITA Valerio MD

## 2020-01-13 LAB
ALBUMIN SERPL-MCNC: 2.2 G/DL (ref 3.2–4.6)
ALBUMIN UR ELPH-MCNC: 10 MG/DL
ALBUMIN/GLOB SERPL: 0.2 {RATIO}
ALBUMIN/GLOB SERPL: 0.6 {RATIO} (ref 1.2–3.5)
ALP SERPL-CCNC: 50 U/L (ref 50–136)
ALPHA1 GLOB 24H UR ELPH-MCNC: 6.6 MG/DL
ALPHA2 GLOB SERPL ELPH-MCNC: 12.9 MG/DL
ALT SERPL-CCNC: 240 U/L (ref 12–65)
ANION GAP SERPL CALC-SCNC: 10 MMOL/L (ref 7–16)
AST SERPL-CCNC: 128 U/L (ref 15–37)
B-GLOBULIN UR QL ELPH: 23.1 MG/DL
BASOPHILS # BLD: 0.1 K/UL (ref 0–0.2)
BASOPHILS NFR BLD: 1 % (ref 0–2)
BILIRUB DIRECT SERPL-MCNC: 0.1 MG/DL
BILIRUB SERPL-MCNC: 0.5 MG/DL (ref 0.2–1.1)
BUN SERPL-MCNC: 62 MG/DL (ref 8–23)
CALCIUM SERPL-MCNC: 9 MG/DL (ref 8.3–10.4)
CHLORIDE SERPL-SCNC: 107 MMOL/L (ref 98–107)
CO2 SERPL-SCNC: 28 MMOL/L (ref 21–32)
COLLECT DURATION TIME UR: 24 HR
CREAT SERPL-MCNC: 7.93 MG/DL (ref 0.6–1)
DIFFERENTIAL METHOD BLD: ABNORMAL
EOSINOPHIL # BLD: 0.2 K/UL (ref 0–0.8)
EOSINOPHIL NFR BLD: 3 % (ref 0.5–7.8)
ERYTHROCYTE [DISTWIDTH] IN BLOOD BY AUTOMATED COUNT: 18.1 % (ref 11.9–14.6)
GAMMA GLOB MFR UR ELPH: 10.4 MG/DL
GLOBULIN SER CALC-MCNC: 3.8 G/DL (ref 2.3–3.5)
GLUCOSE BLD STRIP.AUTO-MCNC: 171 MG/DL (ref 65–100)
GLUCOSE BLD STRIP.AUTO-MCNC: 85 MG/DL (ref 65–100)
GLUCOSE BLD STRIP.AUTO-MCNC: 86 MG/DL (ref 65–100)
GLUCOSE BLD STRIP.AUTO-MCNC: 87 MG/DL (ref 65–100)
GLUCOSE SERPL-MCNC: 87 MG/DL (ref 65–100)
HAPTOGLOB SERPL-MCNC: 307 MG/DL (ref 30–200)
HCT VFR BLD AUTO: 24.1 % (ref 35.8–46.3)
HGB BLD-MCNC: 8 G/DL (ref 11.7–15.4)
IMM GRANULOCYTES # BLD AUTO: 0 K/UL (ref 0–0.5)
IMM GRANULOCYTES NFR BLD AUTO: 1 % (ref 0–5)
LYMPHOCYTES # BLD: 1.6 K/UL (ref 0.5–4.6)
LYMPHOCYTES NFR BLD: 24 % (ref 13–44)
M PROTEIN UR-MCNC: NORMAL MG/DL
MCH RBC QN AUTO: 29.7 PG (ref 26.1–32.9)
MCHC RBC AUTO-ENTMCNC: 33.2 G/DL (ref 31.4–35)
MCV RBC AUTO: 89.6 FL (ref 79.6–97.8)
MONOCYTES # BLD: 0.7 K/UL (ref 0.1–1.3)
MONOCYTES NFR BLD: 10 % (ref 4–12)
NEUTS SEG # BLD: 4.1 K/UL (ref 1.7–8.2)
NEUTS SEG NFR BLD: 61 % (ref 43–78)
NRBC # BLD: 0 K/UL (ref 0–0.2)
PLATELET # BLD AUTO: 267 K/UL (ref 150–450)
PMV BLD AUTO: 12.1 FL (ref 9.4–12.3)
POTASSIUM SERPL-SCNC: 3.5 MMOL/L (ref 3.5–5.1)
PROT 24H UR-MRATE: 725 MG/24HR
PROT PATTERN SPEC IFE-IMP: NORMAL
PROT PATTERN UR ELPH-IMP: NORMAL
PROT SERPL-MCNC: 6 G/DL (ref 6.3–8.2)
PROT UR-MCNC: 63 MG/DL
RBC # BLD AUTO: 2.69 M/UL (ref 4.05–5.2)
SODIUM SERPL-SCNC: 145 MMOL/L (ref 136–145)
SPECIMEN VOL ?TM UR: 1150 ML
WBC # BLD AUTO: 6.8 K/UL (ref 4.3–11.1)

## 2020-01-13 PROCEDURE — 80048 BASIC METABOLIC PNL TOTAL CA: CPT

## 2020-01-13 PROCEDURE — 36415 COLL VENOUS BLD VENIPUNCTURE: CPT

## 2020-01-13 PROCEDURE — 80076 HEPATIC FUNCTION PANEL: CPT

## 2020-01-13 PROCEDURE — 82962 GLUCOSE BLOOD TEST: CPT

## 2020-01-13 PROCEDURE — 85025 COMPLETE CBC W/AUTO DIFF WBC: CPT

## 2020-01-13 PROCEDURE — 77030020263 HC SOL INJ SOD CL0.9% LFCR 1000ML

## 2020-01-13 PROCEDURE — 74011250637 HC RX REV CODE- 250/637: Performed by: NURSE PRACTITIONER

## 2020-01-13 PROCEDURE — 65270000029 HC RM PRIVATE

## 2020-01-13 PROCEDURE — 74011636637 HC RX REV CODE- 636/637: Performed by: INTERNAL MEDICINE

## 2020-01-13 RX ORDER — POLYETHYLENE GLYCOL 3350 17 G/17G
17 POWDER, FOR SOLUTION ORAL DAILY PRN
Status: DISCONTINUED | OUTPATIENT
Start: 2020-01-13 | End: 2020-01-20 | Stop reason: HOSPADM

## 2020-01-13 RX ADMIN — FERROUS SULFATE TAB 325 MG (65 MG ELEMENTAL FE) 325 MG: 325 (65 FE) TAB at 09:06

## 2020-01-13 RX ADMIN — Medication 10 ML: at 05:21

## 2020-01-13 RX ADMIN — Medication 10 ML: at 14:00

## 2020-01-13 RX ADMIN — INSULIN LISPRO 2 UNITS: 100 INJECTION, SOLUTION INTRAVENOUS; SUBCUTANEOUS at 12:01

## 2020-01-13 NOTE — PROGRESS NOTES
[facility] Progress Note         Admit Date:  1/8/2020    Today's Date:  1/13/2020    CC:  Anemia, elevated liver chemistry    Subjective:     Patient with no overt bleeding, has received IV iron infusion here. Agreeable to eventual EGD/colonoscopy which will be likely done as an outpatient after acute issues improve. Medications:   Current Facility-Administered Medications   Medication Dose Route Frequency    polyethylene glycol (MIRALAX) packet 17 g  17 g Oral DAILY PRN    0.9% sodium chloride infusion  75 mL/hr IntraVENous CONTINUOUS    ferrous sulfate tablet 325 mg  1 Tab Oral DAILY WITH BREAKFAST    sodium chloride (NS) flush 5-40 mL  5-40 mL IntraVENous PRN    sodium chloride (NS) flush 5-40 mL  5-40 mL IntraVENous Q8H    sodium chloride (NS) flush 5-40 mL  5-40 mL IntraVENous PRN    ondansetron (ZOFRAN) injection 4 mg  4 mg IntraVENous Q6H PRN    bisacodyl (DULCOLAX) tablet 5 mg  5 mg Oral DAILY PRN    insulin lispro (HUMALOG) injection   SubCUTAneous AC&HS       Review of Systems:  ROS was obtained, with pertinent positives as listed above. No chest pain or SOB. Diet:  regular    Objective:   Vitals:  Visit Vitals  /80   Pulse (!) 59   Temp 98.2 °F (36.8 °C)   Resp 18   Ht 5' 6\" (1.676 m)   Wt 85.3 kg (188 lb)   SpO2 95%   BMI 30.34 kg/m²     Intake/Output:  No intake/output data recorded. 01/11 1901 - 01/13 0700  In: 56 [P.O.:740]  Out: 825 [Urine:825]  Exam:  General appearance: alert, cooperative, no distress  Skin: pale  Lungs: clear to auscultation bilaterally anteriorly  Heart: regular rate and rhythm  Abdomen: soft, non-tender.  Bowel sounds normal. No masses, no organomegaly  Extremities: extremities normal, atraumatic, no cyanosis or edema  Neuro:  alert and oriented    Data Review (Labs):    Recent Labs     01/13/20  0552 01/12/20  0707 01/11/20  0743 01/10/20  2056   WBC 6.8  --  5.4  --    HGB 8.0*  --  8.5*  --    HCT 24.1*  --  24.9*  --     --  218  --    MCV 89.6  --  88.3  --     139 143  --    K 3.5 3.6 3.6  --     101 108*  --    CO2 28 30 24  --    BUN 62* 64* 62*  --    CREA 7.93* 8.02* 7.35*  --    CA 9.0 8.9 8.6  --    GLU 87 130* 106*  --    AP  --   --  45*  --    SGOT  --   --  241*  --    ALT  --   --  288*  --    TBILI  --   --  0.5 0.5   ALB  --   --  2.4*  --    TP  --   --  5.8*  --        Assessment:     Principal Problem:    DEANNA (acute kidney injury) ([facility] (1/8/2020)    Active Problems:    Hypertension (6/30/2009)      Hyperlipidemia (6/30/2009)      Coronary atherosclerosis of native coronary artery (7/23/2009)      Overview: 7/2009 - PCI of the RCA 3.5x33 and 3.5x18 Cypher      7/2009 - PCI of the LAD 2.5x18 Cypher, LCX 2.75x13 Cypher with kissing       POBA of small  OM1      06/2013:  Stress MPI with no ischemia and small inferior apical fixed       defect in patient with prior IMI      06/2013:  Normal echo      04/2014:  Stable CAD and FFR LAD 0.88      01/2018:  PCI mLAD for ISR - 2.5x28 Synergy                  DM2 (diabetes mellitus, type 2) ([facility] (1/9/2015)      Anemia (1/10/2015)      Elevated liver enzymes (1/8/2020)      Non-traumatic rhabdomyolysis (1/9/2020)    76 y.o. female with PMH  DJD, multiple knee surgeries, debility confined to a wheelchair, HTN, HLD, DM II, CAD s/p MI and cardiac stents x4 (on Plavix, held since admit per pt), admitted 1/8 with suspected Rhabdo, DEANNA, being seen in GI consultation at the request of Raffi Miller NP for anemia. She appears to have had anemia for quite some time. Labs on admission revealed low HGb 9.2 with normal MCV though with low iron studies. HGb did drop to as low as 7.6 though is now stable at Hgb 8.5 without transfusion pRBC. Hematology has evaluated and arranged for IV iron with Infed and GI was consulted given CORTEZ in pt due for colonoscopy. No overt GI bleeding. No GI complaints in general other than c/o more loose fitting clothing.   No NSAIDs or other blood thinners. No tobacco or ETOH. She had an EGD 11/19/14 for atypical chest pain that showed a hiatal hernia. Colonoscopy 7/5/08 for GI bleed revealed diverticulosis and was otherwise normal exam to terminal ileum. She was seen in our office 5/2016 to discuss colon cancer screening though then due to cardiac history, debility, pt voiced desire to avoid endoscopic evaluation at that time. Plan:     1.  Hgb overall stable with no overt bleeding; has received IV iron infusion here  2. Liver chemistry improving; today's values pending  3. Viral hepatitis panel negative    Patient is seen and examined in collaboration with Dr. Destiny Johansen. Assessment and plan as per Dr. Destiny Johansen. Krista Stewart NP    ATTENDING NOTE:  I agree with the above assessment and plan. No recurrence of overt bleeding. Suspect elevated LFTs due to acute illness including rhabdomyolysis. No evidence of extrahepatic obstruction. We will sign off, but do not hesitate to contact us for any additional assistance. We will arrange for a GI follow-up office visit in 3-4 weeks. Patient will be contacted by our office. At follow up visit, will arrange for EGD and colonoscopy for evaluation of iron deficiency anemia and colon cancer screening.      Indira Car MD

## 2020-01-13 NOTE — PROGRESS NOTES
CM screened chart for discharge planning. No needs voiced at this time. Please consult CM if any needs shall arise. CM continues to follow pt to assist with discharge planning.

## 2020-01-13 NOTE — PROGRESS NOTES
Interdisciplinary Rounds completed. Nursing, Case Management, and Physician  present. Plan of care reviewed and updated. waiting on renal function to improve.

## 2020-01-13 NOTE — PROGRESS NOTES
Progress Note    Patient: Brian Keller MRN: [id number]  SSN: [id number]    YOB: 1945  Age: 76 y.o. Sex: female      Admit Date: 1/8/2020    LOS: 5 days     Subjective:     Patient came to ER because of abnormal lab test from her primary doctor's office.      Patient has been feeling weak for the past 2 or 3 weeks. She went to see her primary doctor. Part of the work-up she was found to have markedly elevated creatinine and liver enzymes she was advised to come to emergency room for evaluation.     Patient reports losing weight for the past months. She could not tell me how much she lost in terms of her weight but she knew that she must have lost weight. Appetite is poor. No fever. No shortness of breath. No swelling. She thinks her urine output is decreased as well. No joint pain. No skin rashes. She denies nausea vomiting. No abdominal pain. No blood in urine. No blood in her stool. No hematemesis. No hemoptysis.      She also denies sinus symptoms. Dened headache. Patient denies taking over-the-counter medications long-term. No herbal tea.      Denied acutely ill from acute illness. No chronic diarrhea.      In the emergency room she was found to have significant elevation of creatinine, and liver enzymes.      Past medical history significant for diabetes mellitus, CAD post stent placement hypertension, , hyperlipidemia. Patient was found to have elevated creatinine kinase. She was hydrated. Today is a very for stated her creatinine seem to level off. patient is feeling fine. She is eating well. She is making good amount of urine.      Objective:     Vitals:    01/12/20 1933 01/12/20 2320 01/13/20 0421 01/13/20 0739   BP: 149/77 156/76 150/73 143/80   Pulse: 65 64 65 (!) 59   Resp: 18 18 18 18   Temp: 98.2 °F (36.8 °C) 97.6 °F (36.4 °C) 97.6 °F (36.4 °C) 98.2 °F (36.8 °C)   SpO2: 95% 96% 94% 95%   Weight:       Height:            Intake and Output:  Current Shift: No intake/output data recorded. Last three shifts: 01/11 1901 - 01/13 0700  In: 740 [P.O.:740]  Out: 825 [Urine:825]    Physical Exam:     General:                    The patient is a pleasant female in no acute distress.  Patient has been feeling weak. AYQW:                                   [id number]/[id number]. Eyes:                                   palpebral pallor, no scleral icterus. ENT:                                    External auricular and nasal exam within normal limits.                                             UHANBH membranes are moist.  Neck:                                   Supple, non-tender, no JVD. Lungs:                       Clear to auscultation bilaterally without wheezes or crackles.                                             No respiratory distress or accessory muscle use. Heart:                                  Regular rate and rhythm, without murmurs, rubs, or gallops. Abdomen:                  Soft, non-tender, non-distended with normoactive bowel sounds. Genitourinary:           No tenderness over the bladder or bilateral CVAs. Extremities:               Without clubbing, cyanosis, or edema. Skin:                                    Normal color, texture, and turgor. No rashes, lesions, or jaundice. Pulses:                      Radial and dorsalis pedis pulses present 2+ bilaterally.                                               Capillary refill <2s. Neurologic:                CN II-XII grossly intact and symmetrical.                                               Moving all four extremities well with no focal deficits. Psychiatric:                Pleasant demeanor, appropriate affect.  Alert and oriented x 3    Lab/Data Review:    Recent Results (from the past 24 hour(s))   GLUCOSE, POC    Collection Time: 01/12/20 10:56 AM   Result Value Ref Range    Glucose (POC) 92 65 - 100 mg/dL   GLUCOSE, POC    Collection Time: 01/12/20  4:14 PM Result Value Ref Range    Glucose (POC) 136 (H) 65 - 100 mg/dL   GLUCOSE, POC    Collection Time: 01/12/20  8:46 PM   Result Value Ref Range    Glucose (POC) 98 65 - 730 mg/dL   METABOLIC PANEL, BASIC    Collection Time: 01/13/20  5:52 AM   Result Value Ref Range    Sodium 145 136 - 145 mmol/L    Potassium 3.5 3.5 - 5.1 mmol/L    Chloride 107 98 - 107 mmol/L    CO2 28 21 - 32 mmol/L    Anion gap 10 7 - 16 mmol/L    Glucose 87 65 - 100 mg/dL    BUN 62 (H) 8 - 23 MG/DL    Creatinine 7.93 (H) 0.6 - 1.0 MG/DL    GFR est AA 6 (L) >60 ml/min/1.73m2    GFR est non-AA 5 (L) >60 ml/min/1.73m2    Calcium 9.0 8.3 - 10.4 MG/DL   CBC WITH AUTOMATED DIFF    Collection Time: 01/13/20  5:52 AM   Result Value Ref Range    WBC 6.8 4.3 - 11.1 K/uL    RBC 2.69 (L) 4.05 - 5.2 M/uL    HGB 8.0 (L) 11.7 - 15.4 g/dL    HCT 24.1 (L) 35.8 - 46.3 %    MCV 89.6 79.6 - 97.8 FL    MCH 29.7 26.1 - 32.9 PG    MCHC 33.2 31.4 - 35.0 g/dL    RDW 18.1 (H) 11.9 - 14.6 %    PLATELET 256 628 - 156 K/uL    MPV 12.1 9.4 - 12.3 FL    ABSOLUTE NRBC 0.00 0.0 - 0.2 K/uL    DF AUTOMATED      NEUTROPHILS 61 43 - 78 %    LYMPHOCYTES 24 13 - 44 %    MONOCYTES 10 4.0 - 12.0 %    EOSINOPHILS 3 0.5 - 7.8 %    BASOPHILS 1 0.0 - 2.0 %    IMMATURE GRANULOCYTES 1 0.0 - 5.0 %    ABS. NEUTROPHILS 4.1 1.7 - 8.2 K/UL    ABS. LYMPHOCYTES 1.6 0.5 - 4.6 K/UL    ABS. MONOCYTES 0.7 0.1 - 1.3 K/UL    ABS. EOSINOPHILS 0.2 0.0 - 0.8 K/UL    ABS. BASOPHILS 0.1 0.0 - 0.2 K/UL    ABS. IMM. GRANS. 0.0 0.0 - 0.5 K/UL   GLUCOSE, POC    Collection Time: 01/13/20  7:43 AM   Result Value Ref Range    Glucose (POC) 87 65 - 100 mg/dL     US retroperitoneum  1-9-2020    IMPRESSION:     1. Diffusely increased renal cortical echogenicity bilaterally, most likely  related to medical renal disease.     2. No evidence of obstructive uropathy.     3. 0.4 cm right renal calculus.       Current Facility-Administered Medications:     polyethylene glycol (MIRALAX) packet 17 g, 17 g, Oral, DAILY PRN, Radha White MD    0.9% sodium chloride infusion, 75 mL/hr, IntraVENous, CONTINUOUS, Argelia Patel MD, Last Rate: 75 mL/hr at 01/12/20 2357, 75 mL/hr at 01/12/20 2357    ferrous sulfate tablet 325 mg, 1 Tab, Oral, DAILY WITH BREAKFAST, Kayla Fonseca, JAMIE, 325 mg at 01/13/20 0906    sodium chloride (NS) flush 5-40 mL, 5-40 mL, IntraVENous, PRN, Logan Dhillon PA    sodium chloride (NS) flush 5-40 mL, 5-40 mL, IntraVENous, Q8H, Argelia Patel MD, 10 mL at 01/13/20 0521    sodium chloride (NS) flush 5-40 mL, 5-40 mL, IntraVENous, PRN, Argelia Patel MD    ondansetron (ZOFRAN) injection 4 mg, 4 mg, IntraVENous, Q6H PRN, Argelia Patel MD    bisacodyl (DULCOLAX) tablet 5 mg, 5 mg, Oral, DAILY PRN, Argelia Patel MD    insulin lispro (HUMALOG) injection, , SubCUTAneous, AC&HS, Radha White MD, Stopped at 01/12/20 0730      Assessment:     Principal Problem:    DEANNA (acute kidney injury) ([facility] (1/8/2020)    Active Problems:    Hypertension (6/30/2009)      Hyperlipidemia (6/30/2009)      Coronary atherosclerosis of native coronary artery (7/23/2009)      Overview: 7/2009 - PCI of the RCA 3.5x33 and 3.5x18 Cypher      7/2009 - PCI of the LAD 2.5x18 Cypher, LCX 2.75x13 Cypher with kissing       POBA of small  OM1      06/2013:  Stress MPI with no ischemia and small inferior apical fixed       defect in patient with prior IMI      06/2013:  Normal echo      04/2014:  Stable CAD and FFR LAD 0.88      01/2018:  PCI mLAD for ISR - 2.5x28 Synergy                  DM2 (diabetes mellitus, type 2) ([facility] (1/9/2015)      Anemia (1/10/2015)      Elevated liver enzymes (1/8/2020)      Non-traumatic rhabdomyolysis (1/9/2020)        Plan:     Acute kidney injury  With rhabdomyolysis  In the presence of elevated liver enzymes  And now is found to have elevated Lamda light chains. Likely secondary to medication causing liver toxicity and rhabdomyolysis.    So far hepatitis panel is negative. Patient was having metabolic acidosis. Improved with sodium bicarbonate supplement . Katherene Means Sodium bicarbonate has been stopped. Today creatinine seems to have no change from yesterday. Will monitor closely. So far patient is nonoliguric.      diabetes mellitus type 2  Monitor blood sugar. Cover with insulin sliding scale accordingly. off metformin for now due to her recent poor kidney function.      Hypertension  Monitor blood pressure and manage accordingly. Avoid ACE inhibitor for now.      Hyperlipidemia  Avoid statin for now due to hepatitis. Avoid acetaminophen too.      We will hold aspirin, or Plavix for now in case she needs to have kidney biopsy.      I have discussed with patient regarding advance directive. Patient would like to have a DNR status.       DVT prophylaxis : SCD     Discharge planning: To be determined.      Signed By: Sarahi Masters MD     January 13, 2020

## 2020-01-13 NOTE — PROGRESS NOTES
[facility]        Subjective: A vs CKD? Resting comfortably  No complaints  Review of Systems -   General ROS: negative for - fever, chills  Respiratory ROS: no SOB, cough, SYED  Cardiovascular ROS: no CP, palpitations  Gastrointestinal ROS: no N&V, abdominal pain, diarrhea  Genito-Urinary ROS: no difficulty voiding, dysuria  Neurological ROS: no seizures, focal weekness        Objective:    Vitals:    01/12/20 2320 01/13/20 0421 01/13/20 0739 01/13/20 1217   BP: 156/76 150/73 143/80 152/78   Pulse: 64 65 (!) 59 74   Resp: 18 18 18 18   Temp: 97.6 °F (36.4 °C) 97.6 °F (36.4 °C) 98.2 °F (36.8 °C) 98.7 °F (37.1 °C)   SpO2: 96% 94% 95% 96%   Weight:       Height:           PE  Gen: in no acute distress  CV:reg rate  Chest:clear  Abd: soft  Ext/Access: no edema       . LAB  Recent Labs     01/13/20  0552 01/11/20  0743   WBC 6.8 5.4   HGB 8.0* 8.5*   HCT 24.1* 24.9*    218     Recent Labs     01/13/20  0552 01/12/20  0707 01/11/20  0743 01/10/20  2056    139 143  --    K 3.5 3.6 3.6  --     101 108*  --    CO2 28 30 24  --    GLU 87 130* 106*  --    BUN 62* 64* 62*  --    CREA 7.93* 8.02* 7.35*  --    CA 9.0 8.9 8.6  --    ALB 2.2*  --  2.4*  --    TBILI 0.5  --  0.5 0.5   *  --  288*  --    SGOT 128*  --  241*  --            Radiology    A/P:   Patient Active Problem List   Diagnosis Code    Hypertension I10    Hyperlipidemia E78.5    DJD (degenerative joint disease) M19.90    GERD (gastroesophageal reflux disease) K21.9    Coronary atherosclerosis of native coronary artery I25.10    Other specified arthropathy, shoulder region M12.819    Nontraumatic rupture of tendons of biceps (long head) M66.329    DM2 (diabetes mellitus, type 2) ([facility]) E11.9    Anemia D64.9    Anemia associated with acute blood loss D62    Palpitations R00.2    Abnormal EKG R94.31    Neuropathy G62.9    CAD (coronary artery disease) I25.10    DEANNA (acute kidney injury) ([facility]) N17.9    Elevated liver enzymes R74.8    Non-traumatic rhabdomyolysis M62.82       DEANNA vs CKD -  Stable creatinine. Good UO.  We may be at a plateau    Abnormal LFT's    HTN    Anemia    DM     Gerda Xavier MD

## 2020-01-14 PROBLEM — Z66 DNR (DO NOT RESUSCITATE): Status: ACTIVE | Noted: 2020-01-14

## 2020-01-14 LAB
ALBUMIN SERPL-MCNC: 2.1 G/DL (ref 3.2–4.6)
ALBUMIN/GLOB SERPL: 0.6 {RATIO} (ref 1.2–3.5)
ALP SERPL-CCNC: 49 U/L (ref 50–136)
ALT SERPL-CCNC: 175 U/L (ref 12–65)
ANION GAP SERPL CALC-SCNC: 10 MMOL/L (ref 7–16)
ANION GAP SERPL CALC-SCNC: 9 MMOL/L (ref 7–16)
AST SERPL-CCNC: 68 U/L (ref 15–37)
BILIRUB SERPL-MCNC: 0.5 MG/DL (ref 0.2–1.1)
BUN SERPL-MCNC: 58 MG/DL (ref 8–23)
BUN SERPL-MCNC: 58 MG/DL (ref 8–23)
CALCIUM SERPL-MCNC: 8.6 MG/DL (ref 8.3–10.4)
CALCIUM SERPL-MCNC: 8.9 MG/DL (ref 8.3–10.4)
CHLORIDE SERPL-SCNC: 106 MMOL/L (ref 98–107)
CHLORIDE SERPL-SCNC: 107 MMOL/L (ref 98–107)
CK SERPL-CCNC: 2023 U/L (ref 21–215)
CO2 SERPL-SCNC: 25 MMOL/L (ref 21–32)
CO2 SERPL-SCNC: 26 MMOL/L (ref 21–32)
CREAT SERPL-MCNC: 7.28 MG/DL (ref 0.6–1)
CREAT SERPL-MCNC: 7.37 MG/DL (ref 0.6–1)
ERYTHROCYTE [DISTWIDTH] IN BLOOD BY AUTOMATED COUNT: 18.2 % (ref 11.9–14.6)
EST. AVERAGE GLUCOSE BLD GHB EST-MCNC: 134 MG/DL
GLOBULIN SER CALC-MCNC: 3.8 G/DL (ref 2.3–3.5)
GLUCOSE BLD STRIP.AUTO-MCNC: 103 MG/DL (ref 65–100)
GLUCOSE BLD STRIP.AUTO-MCNC: 126 MG/DL (ref 65–100)
GLUCOSE BLD STRIP.AUTO-MCNC: 91 MG/DL (ref 65–100)
GLUCOSE BLD STRIP.AUTO-MCNC: 93 MG/DL (ref 65–100)
GLUCOSE SERPL-MCNC: 97 MG/DL (ref 65–100)
GLUCOSE SERPL-MCNC: 98 MG/DL (ref 65–100)
HBA1C MFR BLD: 6.3 % (ref 4.8–6)
HCT VFR BLD AUTO: 23.9 % (ref 35.8–46.3)
HGB BLD-MCNC: 7.9 G/DL (ref 11.7–15.4)
MCH RBC QN AUTO: 30.2 PG (ref 26.1–32.9)
MCHC RBC AUTO-ENTMCNC: 33.1 G/DL (ref 31.4–35)
MCV RBC AUTO: 91.2 FL (ref 79.6–97.8)
NRBC # BLD: 0 K/UL (ref 0–0.2)
PLATELET # BLD AUTO: 301 K/UL (ref 150–450)
PMV BLD AUTO: 12.5 FL (ref 9.4–12.3)
POTASSIUM SERPL-SCNC: 3.3 MMOL/L (ref 3.5–5.1)
POTASSIUM SERPL-SCNC: 3.3 MMOL/L (ref 3.5–5.1)
PROT SERPL-MCNC: 5.9 G/DL (ref 6.3–8.2)
RBC # BLD AUTO: 2.62 M/UL (ref 4.05–5.2)
SODIUM SERPL-SCNC: 140 MMOL/L (ref 136–145)
SODIUM SERPL-SCNC: 143 MMOL/L (ref 136–145)
WBC # BLD AUTO: 7.5 K/UL (ref 4.3–11.1)

## 2020-01-14 PROCEDURE — 85027 COMPLETE CBC AUTOMATED: CPT

## 2020-01-14 PROCEDURE — 74011250637 HC RX REV CODE- 250/637: Performed by: INTERNAL MEDICINE

## 2020-01-14 PROCEDURE — 74011000302 HC RX REV CODE- 302: Performed by: INTERNAL MEDICINE

## 2020-01-14 PROCEDURE — 82550 ASSAY OF CK (CPK): CPT

## 2020-01-14 PROCEDURE — 97112 NEUROMUSCULAR REEDUCATION: CPT

## 2020-01-14 PROCEDURE — 65270000029 HC RM PRIVATE

## 2020-01-14 PROCEDURE — 77030020263 HC SOL INJ SOD CL0.9% LFCR 1000ML

## 2020-01-14 PROCEDURE — 83036 HEMOGLOBIN GLYCOSYLATED A1C: CPT

## 2020-01-14 PROCEDURE — 80053 COMPREHEN METABOLIC PANEL: CPT

## 2020-01-14 PROCEDURE — 74011250637 HC RX REV CODE- 250/637: Performed by: NURSE PRACTITIONER

## 2020-01-14 PROCEDURE — 82962 GLUCOSE BLOOD TEST: CPT

## 2020-01-14 PROCEDURE — 74011250636 HC RX REV CODE- 250/636: Performed by: INTERNAL MEDICINE

## 2020-01-14 PROCEDURE — 97110 THERAPEUTIC EXERCISES: CPT

## 2020-01-14 PROCEDURE — 97161 PT EVAL LOW COMPLEX 20 MIN: CPT

## 2020-01-14 PROCEDURE — 86580 TB INTRADERMAL TEST: CPT | Performed by: INTERNAL MEDICINE

## 2020-01-14 PROCEDURE — 36415 COLL VENOUS BLD VENIPUNCTURE: CPT

## 2020-01-14 RX ORDER — FAMOTIDINE 20 MG/1
20 TABLET, FILM COATED ORAL DAILY
Status: DISCONTINUED | OUTPATIENT
Start: 2020-01-14 | End: 2020-01-20 | Stop reason: HOSPADM

## 2020-01-14 RX ORDER — NITROGLYCERIN 0.4 MG/1
0.4 TABLET SUBLINGUAL AS NEEDED
Status: DISCONTINUED | OUTPATIENT
Start: 2020-01-14 | End: 2020-01-20 | Stop reason: HOSPADM

## 2020-01-14 RX ORDER — FAMOTIDINE 20 MG/1
20 TABLET, FILM COATED ORAL 2 TIMES DAILY
Status: DISCONTINUED | OUTPATIENT
Start: 2020-01-14 | End: 2020-01-14

## 2020-01-14 RX ORDER — CLOPIDOGREL BISULFATE 75 MG/1
75 TABLET ORAL DAILY
Status: DISCONTINUED | OUTPATIENT
Start: 2020-01-15 | End: 2020-01-20 | Stop reason: HOSPADM

## 2020-01-14 RX ORDER — POTASSIUM CHLORIDE 20 MEQ/1
20 TABLET, EXTENDED RELEASE ORAL
Status: COMPLETED | OUTPATIENT
Start: 2020-01-14 | End: 2020-01-14

## 2020-01-14 RX ORDER — ASPIRIN 81 MG/1
81 TABLET ORAL
Status: DISCONTINUED | OUTPATIENT
Start: 2020-01-14 | End: 2020-01-20 | Stop reason: HOSPADM

## 2020-01-14 RX ORDER — HYDROXYZINE 25 MG/1
25 TABLET, FILM COATED ORAL ONCE
Status: COMPLETED | OUTPATIENT
Start: 2020-01-14 | End: 2020-01-14

## 2020-01-14 RX ADMIN — ASPIRIN 81 MG: 81 TABLET ORAL at 22:39

## 2020-01-14 RX ADMIN — HYDROXYZINE HYDROCHLORIDE 25 MG: 25 TABLET, FILM COATED ORAL at 22:56

## 2020-01-14 RX ADMIN — TUBERCULIN PURIFIED PROTEIN DERIVATIVE 5 UNITS: 5 INJECTION, SOLUTION INTRADERMAL at 12:43

## 2020-01-14 RX ADMIN — Medication 10 ML: at 14:01

## 2020-01-14 RX ADMIN — FAMOTIDINE 20 MG: 20 TABLET, FILM COATED ORAL at 09:20

## 2020-01-14 RX ADMIN — FERROUS SULFATE TAB 325 MG (65 MG ELEMENTAL FE) 325 MG: 325 (65 FE) TAB at 08:47

## 2020-01-14 RX ADMIN — POTASSIUM CHLORIDE 20 MEQ: 20 TABLET, EXTENDED RELEASE ORAL at 12:06

## 2020-01-14 RX ADMIN — SODIUM CHLORIDE 75 ML/HR: 900 INJECTION, SOLUTION INTRAVENOUS at 02:37

## 2020-01-14 RX ADMIN — Medication 10 ML: at 22:00

## 2020-01-14 NOTE — PROGRESS NOTES
[facility]        Subjective: DEANNA vs CKD? Patient seen and examined on rounds, pt reports good UOP with good intake, denies uremic symptoms. Labs and chart reviewed- labs today pending    Review of Systems -   General ROS: negative for - fever, chills  Respiratory ROS: no SOB, cough, SYED  Cardiovascular ROS: no CP, palpitations  Gastrointestinal ROS: no N&V, abdominal pain, diarrhea  Genito-Urinary ROS: no difficulty voiding, dysuria  Neurological ROS: no seizures, focal weekness        Objective:    Vitals:    01/13/20 2300 01/14/20 0327 01/14/20 0542 01/14/20 0755   BP: 126/77 (!) 173/94 156/89 138/68   Pulse: 78 66  69   Resp: 18 18  18   Temp: 98.6 °F (37 °C) 98 °F (36.7 °C)  98.3 °F (36.8 °C)   SpO2: 91% 94%  93%   Weight:       Height:           PE  Gen: in no acute distress, alert and oriented  CV:reg rate, S1, S2, No Rub  Chest:clear bilaterally, no wheezes  Abd: soft, non tender, + BS  Ext/Access: no edema       . LAB  Recent Labs     01/13/20  0552   WBC 6.8   HGB 8.0*   HCT 24.1*        Recent Labs     01/14/20  0941 01/13/20  0552 01/12/20  0707    145 139   K 3.3* 3.5 3.6    107 101   CO2 26 28 30   GLU 98 87 130*   BUN 58* 62* 64*   CREA 7.37* 7.93* 8.02*   CA 8.9 9.0 8.9   ALB 2.1* 2.2*  --    TBILI 0.5 0.5  --    * 240*  --    SGOT 68* 128*  --            Radiology    A/P:   Patient Active Problem List   Diagnosis Code    Hypertension I10    Hyperlipidemia E78.5    DJD (degenerative joint disease) M19.90    GERD (gastroesophageal reflux disease) K21.9    Coronary atherosclerosis of native coronary artery I25.10    Other specified arthropathy, shoulder region M12.819    Nontraumatic rupture of tendons of biceps (long head) M66.329    DM2 (diabetes mellitus, type 2) ([facility]) E11.9    Anemia D64.9    Anemia associated with acute blood loss D62    Palpitations R00.2    Abnormal EKG R94.31    Neuropathy G62.9    CAD (coronary artery disease) I25.10    DEANNA (acute kidney injury) ([facility] N17.9    Elevated liver enzymes R74.8    Non-traumatic rhabdomyolysis M62.82    DNR (do not resuscitate) Z66       DEANNA vs CKD - rhabdomyolysis, Creatinine hopefully plateau- labs today, non oliguric.   -P/C/ratio 2.7, renal imaging with evidence of CKD, no hydronephrosis, 0.4 right renal calculus. CK elevated 2,023, SPEP no M spike,   -continue gentle IVF, follow renal function and quantify uop, no indication for acute RRT at this time.     Abnormal LFT's- improving    HTN- continue antihypertensives    Anemia on Fe PO    DM- check A1c     Emily Lui, NP

## 2020-01-14 NOTE — PROGRESS NOTES
Progress Note    Patient: Deepak Parry MRN: [id number]  SSN: [id number]    YOB: 1945  Age: 76 y.o. Sex: female      Admit Date: 1/8/2020    LOS: 6 days     Subjective:   She was found in no acute distress. The patient feels weak. She is barely eating since her appetite is poor. She does not feel ready to go home since she wont be able to be functional by herself. Objective:     Vitals:    01/13/20 2300 01/14/20 0327 01/14/20 0542 01/14/20 0755   BP: 126/77 (!) 173/94 156/89 138/68   Pulse: 78 66  69   Resp: 18 18  18   Temp: 98.6 °F (37 °C) 98 °F (36.7 °C)  98.3 °F (36.8 °C)   SpO2: 91% 94%  93%   Weight:       Height:            Intake and Output:  Current Shift: No intake/output data recorded. Last three shifts: 01/12 1901 - 01/14 0700  In: 240 [P.O.:240]  Out: -     Physical Exam:     General:                    JSACE weak    NTQX:                                   [id number]. Eyes:                                   palpebral pallor, no scleral icterus. ENT:                                    External auricular and nasal exam within normal limits.                                             ZDJELD membranes are moist.  Neck:                                   Supple, non-tender, no JVD. Lungs:                       Clear to auscultation bilaterally without wheezes or crackles.                                             No respiratory distress or accessory muscle use. Heart:                                  Regular rate and rhythm, without murmurs, rubs, or gallops. Abdomen:                  Soft, non-tender, non-distended with normoactive bowel sounds. Genitourinary:           No tenderness over the bladder or bilateral CVAs. Extremities:               Without clubbing, cyanosis, or edema. Skin:                                    Normal color, texture, and turgor. No rashes, lesions, or jaundice.   Pulses:                      Radial and dorsalis pedis pulses present 2+ bilaterally.                                               Capillary refill <2s. Neurologic:                CN II-XII grossly intact and symmetrical.                                               Moving all four extremities well with no focal deficits. Psychiatric:                Pleasant demeanor, appropriate affect. Alert and oriented x 3    Lab/Data Review:    Recent Results (from the past 24 hour(s))   GLUCOSE, POC    Collection Time: 01/13/20 11:01 AM   Result Value Ref Range    Glucose (POC) 171 (H) 65 - 100 mg/dL   GLUCOSE, POC    Collection Time: 01/13/20  4:53 PM   Result Value Ref Range    Glucose (POC) 85 65 - 100 mg/dL   GLUCOSE, POC    Collection Time: 01/13/20  7:54 PM   Result Value Ref Range    Glucose (POC) 86 65 - 100 mg/dL   GLUCOSE, POC    Collection Time: 01/14/20  7:14 AM   Result Value Ref Range    Glucose (POC) 93 65 - 100 mg/dL     US retroperitoneum  1-9-2020    IMPRESSION:     1. Diffusely increased renal cortical echogenicity bilaterally, most likely  related to medical renal disease.     2. No evidence of obstructive uropathy.     3. 0.4 cm right renal calculus.       Current Facility-Administered Medications:     polyethylene glycol (MIRALAX) packet 17 g, 17 g, Oral, DAILY PRN, Argelia Patel MD    0.9% sodium chloride infusion, 75 mL/hr, IntraVENous, CONTINUOUS, Argelia Patel MD, Last Rate: 75 mL/hr at 01/14/20 0237, 75 mL/hr at 01/14/20 0237    ferrous sulfate tablet 325 mg, 1 Tab, Oral, DAILY WITH BREAKFAST, Kayla Fonseca NP, 325 mg at 01/13/20 0906    sodium chloride (NS) flush 5-40 mL, 5-40 mL, IntraVENous, PRN, [address], [place]    sodium chloride (NS) flush 5-40 mL, 5-40 mL, IntraVENous, Q8H, Argelia Patel MD, 10 mL at 01/13/20 1400    sodium chloride (NS) flush 5-40 mL, 5-40 mL, IntraVENous, PRN, Argelia Patel MD    ondansetron (ZOFRAN) injection 4 mg, 4 mg, IntraVENous, Q6H PRN, Amphan, MD Estela Stout (DULCOLAX) tablet 5 mg, 5 mg, Oral, DAILY PRN, Sudha Houser MD    insulin lispro (HUMALOG) injection, , SubCUTAneous, AC&HS, Sudha Houser MD, Stopped at 01/13/20 1630      Assessment:     Principal Problem:    DEANNA (acute kidney injury) ([facility] (1/8/2020)    Active Problems:    Hypertension (6/30/2009)      Hyperlipidemia (6/30/2009)      Coronary atherosclerosis of native coronary artery (7/23/2009)      Overview: 7/2009 - PCI of the RCA 3.5x33 and 3.5x18 Cypher      7/2009 - PCI of the LAD 2.5x18 Cypher, LCX 2.75x13 Cypher with kissing       POBA of small  OM1      06/2013:  Stress MPI with no ischemia and small inferior apical fixed       defect in patient with prior IMI      06/2013:  Normal echo      04/2014:  Stable CAD and FFR LAD 0.88      01/2018:  PCI mLAD for ISR - 2.5x28 Synergy                  DM2 (diabetes mellitus, type 2) ([facility] (1/9/2015)      Anemia (1/10/2015)      Elevated liver enzymes (1/8/2020)      Non-traumatic rhabdomyolysis (1/9/2020)      DNR (do not resuscitate) (1/14/2020)        Plan:     -Non oliguric Acute kidney injury on CKD:  -possible medication induced / rhabdomyolysis:   She has CKD stage IV  She has elevated Lamda light chains. metabolic acidosis. Improved with sodium bicarbonate supplement   Continue strict IO  IVFs hydration  Nephrology f/u. No need for HD yet  Daily chemistry    -Non traumatic rhabdomyolysis:  Medication induced possible   Continue IVFs  CK levels much better  Avoid nephrotoxic meds     -Elevated LFTs:  Normal hepatitis panel  Medication induced: statins have been held   GI consulted. They signed off already  LFTs improving     -Weakness:  Start PT/OT  PPD     -diabetes mellitus type 2:   Monitor blood sugar  Cover with insulin sliding scale accordingly. off metformin for now due to her recent poor kidney function.      -Hypertension: Avoid ACE inhibitor for now.      -Hyperlipidemia: Avoid statin for now due to hepatitis.      -CAD: will resume asa and plavix         DVT prophylaxis : SCD     Code status: DNR/DNI.     Discharge planning: HOME ONCE MEDICALLY STABLE     Signed By: Angy Luque MD     January 14, 2020

## 2020-01-14 NOTE — PROGRESS NOTES
Problem: Mobility Impaired (Adult and Pediatric)  Goal: *Acute Goals and Plan of Care (Insert Text)  Description  LTG:  (1.)Ms. Jonna Garcia will move from supine to sit and sit to supine, scoot up and down and roll side to side INDEPENDENTLY with bed flat within 7 treatment day(s). (2.)Ms. Jonna Garcia will transfers from bed to wheelchair and wheelchair to bed with MODIFIED INDEPENDENCE using the least restrictive device within 7 treatment day(s). (3.)Ms. Jonna Garcia will perform exercises per HEP for 10+ minutes to improve mobility/transfers within 7 days. ________________________________________________________________________________________________   Outcome: Progressing Towards Goal    PHYSICAL THERAPY: Initial Assessment and PM 1/14/2020  INPATIENT: PT Visit Days : 1  Payor: Oleksandr Archuleta / Plan: [address] / Product Type: Managed Care Medicare /       NAME/AGE/GENDER: Emperatriz Sanchez is a 76 y.o. female   PRIMARY DIAGNOSIS: DEANNA (acute kidney injury) ([facility] [N17.9] DEANNA (acute kidney injury) ([facility] DEANNA (acute kidney injury) ([facility]        ICD-10: Treatment Diagnosis:    Generalized Muscle Weakness (M62.81)  Other abnormalities of gait and mobility (R26.89)  History of falling (Z91.81)   Precaution/Allergies:  Iodinated contrast media; Iothalamate meglumine; Adhesive tape; Codeine; Crestor [rosuvastatin]; Demerol [meperidine]; Dilaudid [hydromorphone (bulk)]; and Oxycodone      ASSESSMENT:     Ms. Jonna Garcia is a 76year old female admitted from home with DEANNA, rhabdo. At baseline pt lives with daughter and son in law in single story home with ramp to enter. Pt typically uses power wheelchair for household mobility due to history of multiple bilateral lower extremity surgeries. She is typically independent with transfers to/from bed and wheelchair; daughter assists with ADLs and transfer to shower chair. Pt reports occasional falls with transfers.  Pt presents in supine, reports feeling very weak and c/o headache. Agreeable to therapy evaluation and mobility. Requires additional time and cueing (verbal, visual, and tactile) for bed mobility and transfer to sitting. Demonstrates fair to fair+ seated balance at edge of bed with impaired posture and mildly impaired coordination. Worked on seated static/dynamic mobility, body mechanics, posture, and positioning in preparation for transfer. Moved wheelchair close to bed; with additional time and cueing pt able to perform lateral transfer to wheelchair with min assist and verbal cues for technique, offloading/avoiding friction, UE/LE placement and body mechanics. Worked on muscle control and coordination during transfer and with scooting/repositioning in chair. Pt performs wc mobility in room and hallway for 250 ft total to improve general strength and activity tolerance. Returned to room, again practiced transfer from wheelchair back to bed due to needing to use bedpan and not having drop arm BSC at this time. Min-mod A for wc to bed transfer and sit to supine transfer. Needs assist and cueing to roll/reposition in bed. Positioned comfortably with needs in reach, nursing assistant present to help. Mirella Mercado appears to be much weaker than baseline with mobility, transfers, balance, and activity tolerance and will benefit from continued therapy during hospital stay to address deficits/maximize independence with mobility. She does not feel strong enough to return home at [place] and asking about rehab. Do feel she would benefit from short rehab stay to work on transfer safety, independence, activity tolerance, and safety.      This section established at most recent assessment   PROBLEM LIST (Impairments causing functional limitations):  Decreased Strength  Decreased Transfer Abilities  Decreased Ambulation Ability/Technique  Decreased Balance  Decreased Activity Tolerance   INTERVENTIONS PLANNED: (Benefits and precautions of physical therapy have been discussed with the patient.)  Balance Exercise  Bed Mobility  Gait Training  Home Exercise Program (HEP)  Therapeutic Activites  Therapeutic Exercise/Strengthening  Transfer Training     TREATMENT PLAN: Frequency/Duration: 3 times a week for duration of hospital stay  Rehabilitation Potential For Stated Goals: Good     REHAB RECOMMENDATIONS (at time of discharge pending progress):    Placement: It is my opinion, based on this patient's performance to date, that Ms. Jona Waite may benefit from intensive therapy at a [address] [place] [address] after discharge due to the functional deficits listed above that are likely to improve with skilled rehabilitation and concerns that he/she may be unsafe to be unsupervised at home due to weakness, need for assistance with mobility/transfers, decreased activity tolerance. Equipment:   Tbd pending progress               HISTORY:   History of Present Injury/Illness (Reason for Referral):  Per H&P, \"Mikayla Escobedo is a 76 y.o. female who came to ER because of abnormal lab test from her primary doctor's office. Patient has been feeling weak for the past 2 or 3 weeks. She went to see her primary doctor. Part of the work-up she was found to have markedly elevated creatinine and liver enzymes she was advised to come to emergency room for evaluation. Patient reports losing weight for the past months. She could not tell me how much she lost in terms of her weight but she knew that she must have lost weight. Appetite is poor. No fever. No shortness of breath. No swelling. She thinks her urine output is decreased as well. No joint pain. No skin rashes. She denies nausea vomiting. No abdominal pain. No blood in urine. No blood in her stool. No hematemesis. No hemoptysis. She also denies sinus symptoms. Dened headache. Patient denies taking over-the-counter medications long-term. No herbal tea.       Denied acutely ill from acute illness. No chronic diarrhea. In the emergency room she was found to have significant elevation of creatinine, and liver enzymes. Hospitalist service is requested to admit the patient. Past medical history significant for diabetes mellitus, CAD post stent placement hypertension, , hyperlipidemia\"    Past Medical History/Comorbidities:   Ms. Senthil Garrett  has a past medical history of Abnormal EKG (12/9/2015), CAD (coronary artery disease) (2009), Chronic pain, Coagulation disorder ([address] Diabetes ([address] (2013), DJD (degenerative joint disease) (6/30/2009), DM2 (diabetes mellitus, type 2) ([address] (1/9/2015), Gastrointestinal disorder, GERD (gastroesophageal reflux disease), Hypertension, Nausea & vomiting, Neuropathy, Other ill-defined conditions(799.89), Palpitations (12/9/2015), Unspecified adverse effect of anesthesia, and Urinary tract infection (1/9/2015). She also has no past medical history of Aneurysm ([address] Arrhythmia, Asthma, Autoimmune disease ([address] Cancer ([address] Chronic kidney disease, Chronic obstructive pulmonary disease ([address] Dementia, Difficult intubation, Heart failure ([address] Liver disease, Malignant hyperthermia due to anesthesia, Morbid obesity ([address] Neurological disorder, Pseudocholinesterase deficiency, Psychiatric disorder, PUD (peptic ulcer disease), Seizures ([address] Sleep disorder, Stroke ([address] Thromboembolus ([address] Thyroid disease, or Unspecified sleep apnea. Ms. Senthil Garrett  has a past surgical history that includes pr cardiac surg procedure unlist; hx appendectomy; hx gyn; hx orthopaedic; hx knee arthroscopy; and hx shoulder replacement.   Social History/Living Environment:   Home Environment: Private residence  Wheelchair Ramp: Yes  One/Two Story Residence: One story  Living Alone: No  Support Systems: Child(destini), Family member(s)  Patient Expects to be Discharged to[de-identified] Private residence  Current DME Used/Available at Home: Wheelchair, power, Shower chair  Tub or Shower Type: Shower  Prior Level of Function/Work/Activity:  Lives with daughter and son in law in single story home w/ramp. Mod I using power wc. Some assist w/ADLs and transfer to/from shower chair. Number of Personal Factors/Comorbidities that affect the Plan of Care: 1-2: MODERATE COMPLEXITY   EXAMINATION:   Most Recent Physical Functioning:   Gross Assessment:  AROM: Generally decreased, functional  Strength: Generally decreased, functional  Coordination: Generally decreased, functional               Posture:  Posture (WDL): Exceptions to WDL  Posture Assessment: Forward head, Rounded shoulders  Balance:  Sitting: Impaired  Sitting - Static: Good (unsupported)  Sitting - Dynamic: Fair (occasional)(+) Bed Mobility:  Rolling: Minimum assistance  Supine to Sit: Moderate assistance  Sit to Supine: Moderate assistance;Maximum assistance  Scooting: Moderate assistance;Maximum assistance  Wheelchair Mobility:     Transfers:     Gait:            Body Structures Involved:  Metabolic  Muscles Body Functions Affected: Movement Related  Metobolic/Endocrine Activities and Participation Affected:  General Tasks and Demands  Mobility  Domestic Life  Community, Social and Civic Life   Number of elements that affect the Plan of Care: 4+: HIGH COMPLEXITY   CLINICAL PRESENTATION:   Presentation: Stable and uncomplicated: LOW COMPLEXITY   CLINICAL DECISION MAKIN [facility] Mobility Inpatient Short Form  How much difficulty does the patient currently have. .. Unable A Lot A Little None   1. Turning over in bed (including adjusting bedclothes, sheets and blankets)? [] 1   [] 2   [x] 3   [] 4   2. Sitting down on and standing up from a chair with arms ( e.g., wheelchair, bedside commode, etc.)   [] 1   [x] 2   [] 3   [] 4   3. Moving from lying on back to sitting on the side of the bed? [] 1   [] 2   [x] 3   [] 4   How much help from another person does the patient currently need. ..  Total A Lot A Little None   4. Moving to and from a bed to a chair (including a wheelchair)? [] 1   [] 2   [x] 3   [] 4   5. Need to walk in hospital room? [x] 1   [] 2   [] 3   [] 4   6. Climbing 3-5 steps with a railing? [x] 1   [] 2   [] 3   [] 4   © 2007, Trustees of [address] [place] [address] [address] [id number], under license to [employer]. All rights reserved      Score:  Initial: 13 Most Recent: X (Date: -- )    Interpretation of Tool:  Represents activities that are increasingly more difficult (i.e. Bed mobility, Transfers, Gait). Medical Necessity:     Patient demonstrates   good   rehab potential due to higher previous functional level. Reason for Services/Other Comments:  Patient   continues to demonstrate capacity to improve strength, mobility, balance, transfers, and activity tolerance which will   increase independence, decrease amount of assistance required from caregiver, and increase safety  . Use of outcome tool(s) and clinical judgement create a POC that gives a: Clear prediction of patient's progress: LOW COMPLEXITY            TREATMENT:   (In addition to Assessment/Re-Assessment sessions the following treatments were rendered)   Pre-treatment Symptoms/Complaints:  \"thank you\"  Pain: Initial:   Pain Intensity 1: 0  Post Session:  0/10     Therapeutic Exercise: (8 Minutes):  Exercises per grid below to improve mobility, strength, balance, and activity tolerance . Required minimal visual and verbal cues to promote proper body alignment, promote proper body posture, and promote proper body mechanics. Progressed activity tolerance and wc mobility as indicated. Neuromuscular Re-education: ( 15 Minutes):  Exercise/activities per grid below to improve balance, coordination, and posture. Required minimal-moderate assistance and visual, verbal, manual, and tactile cues to promote static and dynamic balance in sitting and promote motor control of bilateral, upper extremity(s), lower extremity(s). Braces/Orthotics/Lines/Etc:   IV  O2 Device: Room air  Treatment/Session Assessment:    Response to Treatment:  pt performs mobility with min-mod A  Interdisciplinary Collaboration:   Physical Therapist  Registered Nurse  Rehabilitation Attendant  Certified Nursing Assistant/Patient Care Technician  After treatment position/precautions:   Supine in bed  Bed/Chair-wheels locked  Bed in low position  Call light within reach  Nursing assistant at bedside   Compliance with Program/Exercises: Will assess as treatment progresses  Recommendations/Intent for next treatment session: \"Next visit will focus on advancements to more challenging activities and reduction in assistance provided\".   Total Treatment Duration:  PT Patient Time In/Time Out  Time In: 1350  Time Out: [address], DPT

## 2020-01-14 NOTE — PROGRESS NOTES
Interdisciplinary Rounds completed. Nursing, Case Management, and Physician  present. Plan of care reviewed and updated. Still weak and not eating well. PT/OT ordered.

## 2020-01-15 LAB
ALBUMIN SERPL-MCNC: 1.9 G/DL (ref 3.2–4.6)
ANION GAP SERPL CALC-SCNC: 10 MMOL/L (ref 7–16)
BUN SERPL-MCNC: 64 MG/DL (ref 8–23)
CALCIUM SERPL-MCNC: 8.5 MG/DL (ref 8.3–10.4)
CHLORIDE SERPL-SCNC: 109 MMOL/L (ref 98–107)
CK SERPL-CCNC: 1045 U/L (ref 21–215)
CO2 SERPL-SCNC: 24 MMOL/L (ref 21–32)
CREAT SERPL-MCNC: 7.19 MG/DL (ref 0.6–1)
ERYTHROCYTE [DISTWIDTH] IN BLOOD BY AUTOMATED COUNT: 18.5 % (ref 11.9–14.6)
GLUCOSE BLD STRIP.AUTO-MCNC: 102 MG/DL (ref 65–100)
GLUCOSE BLD STRIP.AUTO-MCNC: 103 MG/DL (ref 65–100)
GLUCOSE BLD STRIP.AUTO-MCNC: 106 MG/DL (ref 65–100)
GLUCOSE BLD STRIP.AUTO-MCNC: 147 MG/DL (ref 65–100)
GLUCOSE SERPL-MCNC: 97 MG/DL (ref 65–100)
HCT VFR BLD AUTO: 20.1 % (ref 35.8–46.3)
HCT VFR BLD AUTO: 28.4 % (ref 35.8–46.3)
HGB BLD-MCNC: 6.7 G/DL (ref 11.7–15.4)
HGB BLD-MCNC: 9.4 G/DL (ref 11.7–15.4)
MCH RBC QN AUTO: 30.5 PG (ref 26.1–32.9)
MCHC RBC AUTO-ENTMCNC: 33.3 G/DL (ref 31.4–35)
MCV RBC AUTO: 91.4 FL (ref 79.6–97.8)
MM INDURATION POC: 0 MM (ref 0–5)
NRBC # BLD: 0 K/UL (ref 0–0.2)
PHOSPHATE SERPL-MCNC: 7 MG/DL (ref 2.3–3.7)
PLATELET # BLD AUTO: 256 K/UL (ref 150–450)
PMV BLD AUTO: 12 FL (ref 9.4–12.3)
POTASSIUM SERPL-SCNC: 3.4 MMOL/L (ref 3.5–5.1)
PPD POC: NORMAL
RBC # BLD AUTO: 2.2 M/UL (ref 4.05–5.2)
SODIUM SERPL-SCNC: 143 MMOL/L (ref 136–145)
WBC # BLD AUTO: 6.6 K/UL (ref 4.3–11.1)

## 2020-01-15 PROCEDURE — 36430 TRANSFUSION BLD/BLD COMPNT: CPT

## 2020-01-15 PROCEDURE — 36415 COLL VENOUS BLD VENIPUNCTURE: CPT

## 2020-01-15 PROCEDURE — 82962 GLUCOSE BLOOD TEST: CPT

## 2020-01-15 PROCEDURE — 65270000029 HC RM PRIVATE

## 2020-01-15 PROCEDURE — 85018 HEMOGLOBIN: CPT

## 2020-01-15 PROCEDURE — 85027 COMPLETE CBC AUTOMATED: CPT

## 2020-01-15 PROCEDURE — 74011250637 HC RX REV CODE- 250/637: Performed by: INTERNAL MEDICINE

## 2020-01-15 PROCEDURE — 30233N1 TRANSFUSION OF NONAUTOLOGOUS RED BLOOD CELLS INTO PERIPHERAL VEIN, PERCUTANEOUS APPROACH: ICD-10-PCS | Performed by: INTERNAL MEDICINE

## 2020-01-15 PROCEDURE — 86900 BLOOD TYPING SEROLOGIC ABO: CPT

## 2020-01-15 PROCEDURE — P9016 RBC LEUKOCYTES REDUCED: HCPCS

## 2020-01-15 PROCEDURE — 74011250636 HC RX REV CODE- 250/636: Performed by: INTERNAL MEDICINE

## 2020-01-15 PROCEDURE — 80069 RENAL FUNCTION PANEL: CPT

## 2020-01-15 PROCEDURE — 82550 ASSAY OF CK (CPK): CPT

## 2020-01-15 PROCEDURE — 77030020263 HC SOL INJ SOD CL0.9% LFCR 1000ML

## 2020-01-15 PROCEDURE — 86923 COMPATIBILITY TEST ELECTRIC: CPT

## 2020-01-15 RX ORDER — HYDROXYZINE PAMOATE 25 MG/1
25 CAPSULE ORAL
Status: DISCONTINUED | OUTPATIENT
Start: 2020-01-15 | End: 2020-01-20 | Stop reason: HOSPADM

## 2020-01-15 RX ORDER — ACETAMINOPHEN 500 MG
500 TABLET ORAL
Status: DISCONTINUED | OUTPATIENT
Start: 2020-01-15 | End: 2020-01-20 | Stop reason: HOSPADM

## 2020-01-15 RX ORDER — POTASSIUM CHLORIDE 20 MEQ/1
20 TABLET, EXTENDED RELEASE ORAL
Status: COMPLETED | OUTPATIENT
Start: 2020-01-15 | End: 2020-01-15

## 2020-01-15 RX ORDER — SODIUM CHLORIDE 9 MG/ML
250 INJECTION, SOLUTION INTRAVENOUS AS NEEDED
Status: DISCONTINUED | OUTPATIENT
Start: 2020-01-15 | End: 2020-01-20 | Stop reason: HOSPADM

## 2020-01-15 RX ADMIN — POTASSIUM CHLORIDE 20 MEQ: 20 TABLET, EXTENDED RELEASE ORAL at 09:08

## 2020-01-15 RX ADMIN — SODIUM CHLORIDE 75 ML/HR: 900 INJECTION, SOLUTION INTRAVENOUS at 06:25

## 2020-01-15 RX ADMIN — Medication 10 ML: at 13:52

## 2020-01-15 RX ADMIN — FAMOTIDINE 20 MG: 20 TABLET, FILM COATED ORAL at 09:08

## 2020-01-15 RX ADMIN — ACETAMINOPHEN 500 MG: 500 TABLET, FILM COATED ORAL at 18:28

## 2020-01-15 RX ADMIN — SODIUM CHLORIDE 25 ML/HR: 900 INJECTION, SOLUTION INTRAVENOUS at 22:00

## 2020-01-15 NOTE — PROGRESS NOTES
Hourly rounds completed. Has no complaints at this, blood sugars were WNL, will give rreport to incoming RN.

## 2020-01-15 NOTE — PROGRESS NOTES
Nutrition Assessment for:   Consult for general nutrition management and supplements (renal pt., decreased appetite) (Tim Wills MD)    Assessment: Patient admitted with DEANNA and medication induced rhabdomyolysis. PMH of HTN, GERD, hyperlipidemia, DM2, and anemia. During dietetic intern visit, patient reported very little PO intake and decreased appetite. States she is tolerating liquids but is not interested in heavy, solid foods. Requests items such as watermelon, fruit, and chicken noodle soup. She reports thirst and has been taking in adequate fluids. Admits she has not liked ONS in the past, but is willing to try at present time (Prefers vanilla and chocolate). The prospect of a fluid ONS seemed appealing with her current appetite and preference for liquids. DIET DIABETIC CONSISTENT CARB Regular; 2 GM NA (House Low NA)      Anthropometrics:  Height: 5' 6\" (167.6 cm), Weight: 85.3 kg (188 lb), source not specified , Body mass index is 30.34 kg/m². BMI class of obese. Macronutrient needs: (using Listed body weight 85.3 kg)  EER: 8511-6952 kcal/day 20-30 kcal/kg  EPR:  g/day 1-1.3 g/kg      Intake/Comparative Standards: Per patient, consuming 0% most meals. This meets <25% of kcal needs and <25% of protein needs. Nutrition Diagnosis:  Inadequate oral intake related to decreased appetite and limited food acceptance as evidenced by preference to drink rather than eat and estimated intake less than estimated needs. Nutrition Intervention:  Meals and snacks: Encourage patient to order appetizing foods (soups, fruits)  Medical food supplement therapy: Glucerna Sheree Huy or Tricia.) TID  Discharge Nutrition Plan: Continue Oral Nutrition Supplement (ONS) at discharge. Recommend Glucerna or a comparable/similar product Three times daily for 30 days unless otherwise directed by your Primary Care Physician. Recommend ONS until appetite and tolerance of solid foods returns.        Bubba Becerra, Dietetic Intern

## 2020-01-15 NOTE — PROGRESS NOTES
[facility]        Subjective: DEANNA vs CKD? Patient seen and examined on rounds, pt reports good UOP and drinking fluids, denies uremic symptoms. Poor appetite with weakness and chronic back pain- asking about rehab. Labs and chart reviewed- renal indices slowly improving     Review of Systems -   General ROS: negative for - fever, chills  Respiratory ROS: no SOB, cough, SYED  Cardiovascular ROS: no CP, palpitations  Gastrointestinal ROS: no N&V, abdominal pain, diarrhea  Genito-Urinary ROS: no difficulty voiding, dysuria  Neurological ROS: no seizures, focal weekness        Objective:    Vitals:    01/14/20 2345 01/15/20 0425 01/15/20 0756 01/15/20 1052   BP: 144/77 126/67 121/64 126/66   Pulse: 66 83 64 66   Resp: 18 18 19 18   Temp: 98.4 °F (36.9 °C) 98.1 °F (36.7 °C) 98.6 °F (37 °C) 98.2 °F (36.8 °C)   SpO2: 96%  94% 94%   Weight:       Height:           PE  Gen: in no acute distress, alert and oriented  CV:reg rate, S1, S2, No Rub  Chest:clear bilaterally, no wheezes  Abd: soft, non tender, + BS  Ext/Access: no edema       . LAB  Recent Labs     01/15/20  0557 01/14/20  0941 01/13/20  0552   WBC 6.6 7.5 6.8   HGB 6.7* 7.9* 8.0*   HCT 20.1* 23.9* 24.1*    301 267     Recent Labs     01/15/20  0557 01/14/20  1233 01/14/20  0941 01/13/20  0552    140 143 145   K 3.4* 3.3* 3.3* 3.5   * 106 107 107   CO2 24 25 26 28   GLU 97 97 98 87   BUN 64* 58* 58* 62*   CREA 7.19* 7.28* 7.37* 7.93*   PHOS 7.0*  --   --   --    CA 8.5 8.6 8.9 9.0   ALB 1.9*  --  2.1* 2.2*   TBILI  --   --  0.5 0.5   ALT  --   --  175* 240*   SGOT  --   --  68* 128*           Radiology    A/P:   Patient Active Problem List   Diagnosis Code    Hypertension I10    Hyperlipidemia E78.5    DJD (degenerative joint disease) M19.90    GERD (gastroesophageal reflux disease) K21.9    Coronary atherosclerosis of native coronary artery I25.10    Other specified arthropathy, shoulder region M12.819    Nontraumatic rupture of tendons of biceps (long head) M66.329    DM2 (diabetes mellitus, type 2) (HCC) E11.9    Anemia D64.9    Anemia associated with acute blood loss D62    Palpitations R00.2    Abnormal EKG R94.31    Neuropathy G62.9    CAD (coronary artery disease) I25.10    DEANNA (acute kidney injury) ([facility]) N17.9    Elevated liver enzymes R74.8    Non-traumatic rhabdomyolysis M62.82    DNR (do not resuscitate) Z66       DEANNA vs CKD - rhabdomyolysis, Creatinine slowly trending down, non oliguric.   -P/C/ratio 2.7, renal imaging with evidence of CKD, no hydronephrosis, 0.4 right renal calculus. CK elevated >14,000->10,291->2,023->1,045, SPEP no M spike,   -continue gentle IVF, follow renal function and quantify uop, no indication for acute RRT at this time.     Abnormal LFT's- improving    HTN- continue antihypertensives    Anemia on Fe PO, PRBC today per primary     DM     Valencia Morales NP

## 2020-01-15 NOTE — PROGRESS NOTES
Hourly rounds completed. Blood transfusion as ordered by MD with no s/s of allergy. Pt has poor appetite,encouraged small frequent feedings. No complaints presented, needs are met at this time. Will give report to incoming RN.

## 2020-01-15 NOTE — PROGRESS NOTES
Progress Note    Patient: Mary Casanova MRN: [id number]  SSN: [id number]    YOB: 1945  Age: 76 y.o. Sex: female      Admit Date: 1/8/2020    LOS: 7 days     Subjective:   She feels week. The patient denies hematuria or melena. Her Hb was 6.7 gr today. Objective:     Vitals:    01/14/20 1939 01/14/20 2345 01/15/20 0425 01/15/20 0756   BP: 150/83 144/77 126/67 121/64   Pulse: 76 66 83 64   Resp: 18 18 18 19   Temp: 98.7 °F (37.1 °C) 98.4 °F (36.9 °C) 98.1 °F (36.7 °C) 98.6 °F (37 °C)   SpO2: 96% 96%  94%   Weight:       Height:            Intake and Output:  Current Shift: No intake/output data recorded. Last three shifts: 01/13 1901 - 01/15 0700  In: -   Out: 200 [Urine:200]    Physical Exam:     General:                    VMPCV weak    AADF:                                   YSOKFQUXAVUJN/EYRKRCFUCR. Eyes:                                   palpebral pallor, no scleral icterus. ENT:                                    External auricular and nasal exam within normal limits.                                             UFYFPS membranes are moist.  Neck:                                   Supple, non-tender, no JVD. Lungs:                       Clear to auscultation bilaterally without wheezes or crackles.                                             No respiratory distress or accessory muscle use. Heart:                                  Regular rate and rhythm, without murmurs, rubs, or gallops. Abdomen:                  Soft, non-tender, non-distended with normoactive bowel sounds. Genitourinary:           No tenderness over the bladder or bilateral CVAs. Extremities:               Without clubbing, cyanosis, or edema. Skin:                                    Normal color, texture, and turgor. No rashes, lesions, or jaundice.   Pulses:                      Radial and dorsalis pedis pulses present 2+ bilaterally.   Telma Hemp refill <2s.   Neurologic:                CN II-XII grossly intact and symmetrical.                                               Moving all four extremities well with no focal deficits. Psychiatric:                Pleasant demeanor, appropriate affect. Alert and oriented x 3    Lab/Data Review:    Recent Results (from the past 24 hour(s))   METABOLIC PANEL, COMPREHENSIVE    Collection Time: 01/14/20  9:41 AM   Result Value Ref Range    Sodium 143 136 - 145 mmol/L    Potassium 3.3 (L) 3.5 - 5.1 mmol/L    Chloride 107 98 - 107 mmol/L    CO2 26 21 - 32 mmol/L    Anion gap 10 7 - 16 mmol/L    Glucose 98 65 - 100 mg/dL    BUN 58 (H) 8 - 23 MG/DL    Creatinine 7.37 (H) 0.6 - 1.0 MG/DL    GFR est AA 7 (L) >60 ml/min/1.73m2    GFR est non-AA 6 (L) >60 ml/min/1.73m2    Calcium 8.9 8.3 - 10.4 MG/DL    Bilirubin, total 0.5 0.2 - 1.1 MG/DL    ALT (SGPT) 175 (H) 12 - 65 U/L    AST (SGOT) 68 (H) 15 - 37 U/L    Alk.  phosphatase 49 (L) 50 - 136 U/L    Protein, total 5.9 (L) 6.3 - 8.2 g/dL    Albumin 2.1 (L) 3.2 - 4.6 g/dL    Globulin 3.8 (H) 2.3 - 3.5 g/dL    A-G Ratio 0.6 (L) 1.2 - 3.5     CK    Collection Time: 01/14/20  9:41 AM   Result Value Ref Range    CK 2,023 (H) 21 - 215 U/L   CBC W/O DIFF    Collection Time: 01/14/20  9:41 AM   Result Value Ref Range    WBC 7.5 4.3 - 11.1 K/uL    RBC 2.62 (L) 4.05 - 5.2 M/uL    HGB 7.9 (L) 11.7 - 15.4 g/dL    HCT 23.9 (L) 35.8 - 46.3 %    MCV 91.2 79.6 - 97.8 FL    MCH 30.2 26.1 - 32.9 PG    MCHC 33.1 31.4 - 35.0 g/dL    RDW 18.2 (H) 11.9 - 14.6 %    PLATELET 421 751 - 820 K/uL    MPV 12.5 (H) 9.4 - 12.3 FL    ABSOLUTE NRBC 0.00 0.0 - 0.2 K/uL   HEMOGLOBIN A1C WITH EAG    Collection Time: 01/14/20  9:41 AM   Result Value Ref Range    Hemoglobin A1c 6.3 (H) 4.8 - 6.0 %    Est. average glucose 134 mg/dL   GLUCOSE, POC    Collection Time: 01/14/20 11:15 AM   Result Value Ref Range    Glucose (POC) 91 65 - 534 mg/dL   METABOLIC PANEL, BASIC    Collection Time: 01/14/20 12:33 PM   Result Value Ref Range    Sodium 140 136 - 145 mmol/L    Potassium 3.3 (L) 3.5 - 5.1 mmol/L    Chloride 106 98 - 107 mmol/L    CO2 25 21 - 32 mmol/L    Anion gap 9 7 - 16 mmol/L    Glucose 97 65 - 100 mg/dL    BUN 58 (H) 8 - 23 MG/DL    Creatinine 7.28 (H) 0.6 - 1.0 MG/DL    GFR est AA 7 (L) >60 ml/min/1.73m2    GFR est non-AA 6 (L) >60 ml/min/1.73m2    Calcium 8.6 8.3 - 10.4 MG/DL   GLUCOSE, POC    Collection Time: 01/14/20  4:30 PM   Result Value Ref Range    Glucose (POC) 103 (H) 65 - 100 mg/dL   GLUCOSE, POC    Collection Time: 01/14/20  8:57 PM   Result Value Ref Range    Glucose (POC) 126 (H) 65 - 100 mg/dL   RENAL FUNCTION PANEL    Collection Time: 01/15/20  5:57 AM   Result Value Ref Range    Sodium 143 136 - 145 mmol/L    Potassium 3.4 (L) 3.5 - 5.1 mmol/L    Chloride 109 (H) 98 - 107 mmol/L    CO2 24 21 - 32 mmol/L    Anion gap 10 7 - 16 mmol/L    Glucose 97 65 - 100 mg/dL    BUN 64 (H) 8 - 23 MG/DL    Creatinine 7.19 (H) 0.6 - 1.0 MG/DL    GFR est AA 7 (L) >60 ml/min/1.73m2    GFR est non-AA 6 (L) >60 ml/min/1.73m2    Calcium 8.5 8.3 - 10.4 MG/DL    Phosphorus 7.0 (H) 2.3 - 3.7 MG/DL    Albumin 1.9 (L) 3.2 - 4.6 g/dL   CBC W/O DIFF    Collection Time: 01/15/20  5:57 AM   Result Value Ref Range    WBC 6.6 4.3 - 11.1 K/uL    RBC 2.20 (L) 4.05 - 5.2 M/uL    HGB 6.7 (LL) 11.7 - 15.4 g/dL    HCT 20.1 (LL) 35.8 - 46.3 %    MCV 91.4 79.6 - 97.8 FL    MCH 30.5 26.1 - 32.9 PG    MCHC 33.3 31.4 - 35.0 g/dL    RDW 18.5 (H) 11.9 - 14.6 %    PLATELET 128 533 - 520 K/uL    MPV 12.0 9.4 - 12.3 FL    ABSOLUTE NRBC 0.00 0.0 - 0.2 K/uL   GLUCOSE, POC    Collection Time: 01/15/20  7:15 AM   Result Value Ref Range    Glucose (POC) 103 (H) 65 - 100 mg/dL     US retroperitoneum  1-9-2020    IMPRESSION:     1. Diffusely increased renal cortical echogenicity bilaterally, most likely  related to medical renal disease.     2. No evidence of obstructive uropathy.     3. 0.4 cm right renal calculus.       Current Facility-Administered Medications:   famotidine (PEPCID) tablet 20 mg, 20 mg, Oral, DAILY, Rockie Peabody, MD, 20 mg at 01/14/20 0920    tuberculin injection 5 Units, 5 Units, IntraDERMal, ONCE, Rockie Peabody, MD, 5 Units at 01/14/20 1243    clopidogrel (PLAVIX) tablet 75 mg, 75 mg, Oral, DAILY, Rockie Peabody, MD    aspirin delayed-release tablet 81 mg, 81 mg, Oral, QHS, Rockie Peabody, MD, 81 mg at 01/14/20 2239    nitroglycerin (NITROSTAT) tablet 0.4 mg, 0.4 mg, SubLINGual, PRN, Rockie Peabody, MD    polyethylene glycol [facility]) packet 17 g, 17 g, Oral, DAILY PRN, Aminata Ramos MD    0.9% sodium chloride infusion, 75 mL/hr, IntraVENous, CONTINUOUS, Argelia Patel MD, Last Rate: 75 mL/hr at 01/15/20 0625, 75 mL/hr at 01/15/20 9342    ferrous sulfate tablet 325 mg, 1 Tab, Oral, DAILY WITH BREAKFAST, Lupe Fonseca, NP, 325 mg at 01/14/20 0847    sodium chloride (NS) flush 5-40 mL, 5-40 mL, IntraVENous, PRN, KAREL Stoner    sodium chloride (NS) flush 5-40 mL, 5-40 mL, IntraVENous, Q8H, Argelia Patel MD, 10 mL at 01/14/20 2200    sodium chloride (NS) flush 5-40 mL, 5-40 mL, IntraVENous, PRN, Argelia Patel MD    ondansetron (ZOFRAN) injection 4 mg, 4 mg, IntraVENous, Q6H PRN, Argelia Patel MD    bisacodyl (DULCOLAX) tablet 5 mg, 5 mg, Oral, DAILY PRN, Aminata Ramos MD    insulin lispro (HUMALOG) injection, , SubCUTAneous, AC&HS, Aminata Ramos MD, Stopped at 01/13/20 1630      Assessment:     Principal Problem:    DEANNA (acute kidney injury) ([facility] [address] (1/8/2020)    Active Problems:    CAD (coronary artery disease) (2/8/2018)      Hypertension (6/30/2009)      Hyperlipidemia (6/30/2009)      GERD (gastroesophageal reflux disease) (6/30/2009)      Coronary atherosclerosis of native coronary artery (7/23/2009)      Overview: 7/2009 - PCI of the RCA 3.5x33 and 3.5x18 Cypher      7/2009 - PCI of the LAD 2.5x18 Cypher, LCX 2.75x13 Cypher with kissing       POBA of small  OM1      06/2013: Stress MPI with no ischemia and small inferior apical fixed       defect in patient with prior IMI      06/2013:  Normal echo      04/2014:  Stable CAD and FFR LAD 0.88      01/2018:  PCI mLAD for ISR - 2.5x28 Synergy                  DM2 (diabetes mellitus, type 2) ([facility] [address] (1/9/2015)      Anemia (1/10/2015)      Elevated liver enzymes (1/8/2020)      Non-traumatic rhabdomyolysis (1/9/2020)      DNR (do not resuscitate) (1/14/2020)        Plan:     -Acute anemia:  Possible CORTEZ after iron study   She denies lower gi bleeding  Transfuse 1 prbc  Monitor hh q 8hrs    -Non oliguric Acute kidney injury on CKD:   -possible medication induced / rhabdomyolysis:   She has CKD stage IV. Cr reached plateau   She has elevated Lamda light chains. Continue strict IO  IVFs hydration, titrate to 25 ml/hr   Nephrology f/u. No need for HD  Daily chemistry    -Non traumatic rhabdomyolysis:improved   Medication induced possible   Continue IVFs    -Elevated LFTs:  Normal hepatitis panel  Medication induced: statins have been held   GI consulted. They signed off     -Weakness:  PT/OT. Candidate for STR. CM on board     -diabetes mellitus type 2:   Monitor blood sugar  Cover with insulin sliding scale accordingly. off metformin for now due to her recent poor kidney function.      -Hypertension: Avoid ACE inhibitor for now.      -Hyperlipidemia: Avoid statin for now due to hepatitis. -CAD: will resume asa and plavix         DVT prophylaxis : SCD     Code status: DNR/DNI. Discharge planning: STR once medically stable.      Signed By: Judge Chrsitelle MD     January 15, 2020

## 2020-01-15 NOTE — PROGRESS NOTES
Attempted to follow up with Pt request for . Pt was sleeping. Did not wake. Please consult Spiritual care as needed.  Hair, Lonell Modest.

## 2020-01-15 NOTE — PROGRESS NOTES
Interdisciplinary Rounds completed. Nursing, Case Management, and Physician  present. Plan of care reviewed and updated. Rehab at discharge.

## 2020-01-15 NOTE — PROGRESS NOTES
CM met with pt to discuss PT recommendation. Per PT note, pt has been recommended for STR. CM informed the pt about PT recommendation and ask if the pt would be willing to complete short term rehab. CM confirmed the pt understood information before providing SNF list. Pt agreed she understood CM explaining  PT recommendation, and would like referral sent to [facility] and [place]. CM sent referral. Awaiting response. CM notified MD. Michaelle Sanchez continues to follow pt.

## 2020-01-16 LAB
ABO + RH BLD: NORMAL
ALBUMIN SERPL-MCNC: 2.2 G/DL (ref 3.2–4.6)
ANION GAP SERPL CALC-SCNC: 10 MMOL/L (ref 7–16)
BLD PROD TYP BPU: NORMAL
BLOOD GROUP ANTIBODIES SERPL: NORMAL
BPU ID: NORMAL
BUN SERPL-MCNC: 59 MG/DL (ref 8–23)
CALCIUM SERPL-MCNC: 9.2 MG/DL (ref 8.3–10.4)
CHLORIDE SERPL-SCNC: 109 MMOL/L (ref 98–107)
CO2 SERPL-SCNC: 23 MMOL/L (ref 21–32)
CREAT SERPL-MCNC: 6.99 MG/DL (ref 0.6–1)
CROSSMATCH RESULT,%XM: NORMAL
GLUCOSE BLD STRIP.AUTO-MCNC: 127 MG/DL (ref 65–100)
GLUCOSE BLD STRIP.AUTO-MCNC: 81 MG/DL (ref 65–100)
GLUCOSE BLD STRIP.AUTO-MCNC: 89 MG/DL (ref 65–100)
GLUCOSE BLD STRIP.AUTO-MCNC: 97 MG/DL (ref 65–100)
GLUCOSE SERPL-MCNC: 84 MG/DL (ref 65–100)
HCT VFR BLD AUTO: 26.1 % (ref 35.8–46.3)
HGB BLD-MCNC: 8.6 G/DL (ref 11.7–15.4)
MM INDURATION POC: 0 MM (ref 0–5)
PHOSPHATE SERPL-MCNC: 6.4 MG/DL (ref 2.3–3.7)
POTASSIUM SERPL-SCNC: 3.4 MMOL/L (ref 3.5–5.1)
PPD POC: NEGATIVE NEGATIVE
SODIUM SERPL-SCNC: 142 MMOL/L (ref 136–145)
SPECIMEN EXP DATE BLD: NORMAL
STATUS OF UNIT,%ST: NORMAL
UNIT DIVISION, %UDIV: 0

## 2020-01-16 PROCEDURE — 97165 OT EVAL LOW COMPLEX 30 MIN: CPT

## 2020-01-16 PROCEDURE — 97530 THERAPEUTIC ACTIVITIES: CPT

## 2020-01-16 PROCEDURE — 74011250636 HC RX REV CODE- 250/636: Performed by: INTERNAL MEDICINE

## 2020-01-16 PROCEDURE — 74011250637 HC RX REV CODE- 250/637: Performed by: INTERNAL MEDICINE

## 2020-01-16 PROCEDURE — 97535 SELF CARE MNGMENT TRAINING: CPT

## 2020-01-16 PROCEDURE — 82962 GLUCOSE BLOOD TEST: CPT

## 2020-01-16 PROCEDURE — 85018 HEMOGLOBIN: CPT

## 2020-01-16 PROCEDURE — 80069 RENAL FUNCTION PANEL: CPT

## 2020-01-16 PROCEDURE — 76937 US GUIDE VASCULAR ACCESS: CPT

## 2020-01-16 PROCEDURE — 97112 NEUROMUSCULAR REEDUCATION: CPT

## 2020-01-16 PROCEDURE — 36415 COLL VENOUS BLD VENIPUNCTURE: CPT

## 2020-01-16 PROCEDURE — 65270000029 HC RM PRIVATE

## 2020-01-16 PROCEDURE — C1751 CATH, INF, PER/CENT/MIDLINE: HCPCS

## 2020-01-16 PROCEDURE — 97110 THERAPEUTIC EXERCISES: CPT

## 2020-01-16 RX ORDER — SODIUM CHLORIDE 9 MG/ML
75 INJECTION, SOLUTION INTRAVENOUS CONTINUOUS
Status: DISCONTINUED | OUTPATIENT
Start: 2020-01-16 | End: 2020-01-18

## 2020-01-16 RX ORDER — POTASSIUM CHLORIDE 20 MEQ/1
20 TABLET, EXTENDED RELEASE ORAL 2 TIMES DAILY
Status: COMPLETED | OUTPATIENT
Start: 2020-01-16 | End: 2020-01-16

## 2020-01-16 RX ORDER — ONDANSETRON 4 MG/1
8 TABLET, ORALLY DISINTEGRATING ORAL
Status: DISCONTINUED | OUTPATIENT
Start: 2020-01-16 | End: 2020-01-20 | Stop reason: HOSPADM

## 2020-01-16 RX ADMIN — ONDANSETRON 8 MG: 4 TABLET, ORALLY DISINTEGRATING ORAL at 09:22

## 2020-01-16 RX ADMIN — POTASSIUM CHLORIDE 20 MEQ: 20 TABLET, EXTENDED RELEASE ORAL at 09:14

## 2020-01-16 RX ADMIN — Medication 5 ML: at 17:52

## 2020-01-16 RX ADMIN — Medication 10 ML: at 08:45

## 2020-01-16 RX ADMIN — FAMOTIDINE 20 MG: 20 TABLET, FILM COATED ORAL at 09:14

## 2020-01-16 RX ADMIN — POTASSIUM CHLORIDE 20 MEQ: 20 TABLET, EXTENDED RELEASE ORAL at 18:09

## 2020-01-16 RX ADMIN — Medication 10 ML: at 21:36

## 2020-01-16 RX ADMIN — Medication 10 ML: at 15:34

## 2020-01-16 RX ADMIN — ACETAMINOPHEN 500 MG: 500 TABLET, FILM COATED ORAL at 21:35

## 2020-01-16 RX ADMIN — ACETAMINOPHEN 500 MG: 500 TABLET, FILM COATED ORAL at 09:14

## 2020-01-16 NOTE — PROGRESS NOTES
Problem: Mobility Impaired (Adult and Pediatric)  Goal: *Acute Goals and Plan of Care  Description  LTG:  (1.)Ms. Mala Staton will move from supine to sit and sit to supine, scoot up and down and roll side to side INDEPENDENTLY with bed flat within 7 treatment day(s). (2.)Ms. Mala Staton will transfers from bed to wheelchair and wheelchair to bed with MODIFIED INDEPENDENCE using the least restrictive device within 7 treatment day(s). (3.)Ms. Mala Staton will perform exercises per HEP for 10+ minutes to improve mobility/transfers within 7 days. ________________________________________________________________________________________________   Outcome: Progressing Towards Goal    PHYSICAL THERAPY: Daily Note and PM 1/16/2020  INPATIENT: PT Visit Days : 2  Payor: [address] [place],9D / Plan: [address] / Product Type: Managed Care Medicare /       NAME/AGE/GENDER: Emma Palomino is a 76 y.o. female   PRIMARY DIAGNOSIS: DEANNA (acute kidney injury) ([facility] [address] [N17.9] DEANNA (acute kidney injury) ([facility] [address] DEANNA (acute kidney injury) ([facility] [address]       ICD-10: Treatment Diagnosis:    · Generalized Muscle Weakness (M62.81)  · Other abnormalities of gait and mobility (R26.89)  · History of falling (Z91.81)   Precaution/Allergies:  Iodinated contrast media; Iothalamate meglumine; Adhesive tape; Codeine; Crestor [rosuvastatin]; Demerol [meperidine]; Dilaudid [hydromorphone (bulk)]; and Oxycodone      ASSESSMENT:     Ms. Mala Staton is a 76year old female admitted from home with DEANNA, rhabdo. At baseline pt lives with daughter and son in law in single story home with ramp to enter. Pt typically uses power wheelchair for household mobility due to history of multiple bilateral lower extremity surgeries. She is typically independent with transfers to/from bed and wheelchair; daughter assists with ADLs and transfer to shower chair. Pt reports occasional falls with transfers.      1/16: Pt presents in wheelchair, reports she has been up for a bit and is getting antsy. Denies any pain. Agreeable to therapy. Pt performed wc mobility in room and hallway for 500 ft total to improve general strength, safety, and activity tolerance. Returned to room, PT instructed pt in seated BLE exercises for general strengthening and mobility. PT instructed pt in transfer from wheelchair back to bed, min A for wc to bed transfer, cues for sequencing and placement of BUE and BLE to facilitate safety and ease of transfer. Sitting EOB with improved sitting balance control and trunk control. Due to low back discomfort pt requested to sit edge of bed for a time, alarm activated for safety and RN notified. Anirudh Reyes appears to be functioning below baseline with mobility, transfers, balance, and activity tolerance and will benefit from continued therapy during hospital stay to address deficits/maximize independence with mobility. She does not feel strong enough to return home at [place] and asking about rehab. Do feel she would benefit from short rehab stay to work on transfer safety, independence, activity tolerance, and safety. She has already begun to make progress with activity tolerance and mobility and is making progress towards stated goals. Will continue therapy per plan of care. This section established at most recent assessment   PROBLEM LIST (Impairments causing functional limitations):  1. Decreased Strength  2. Decreased Transfer Abilities  3. Decreased Ambulation Ability/Technique  4. Decreased Balance  5. Decreased Activity Tolerance   INTERVENTIONS PLANNED: (Benefits and precautions of physical therapy have been discussed with the patient.)  1. Balance Exercise  2. Bed Mobility  3. Gait Training  4. Home Exercise Program (HEP)  5. Therapeutic Activites  6. Therapeutic Exercise/Strengthening  7.  Transfer Training     TREATMENT PLAN: Frequency/Duration: 3 times a week for duration of hospital stay  Rehabilitation Potential For Stated Goals: Good     REHAB RECOMMENDATIONS (at time of discharge pending progress):    Placement: It is my opinion, based on this patient's performance to date, that Ms. Humble Feliz may benefit from intensive therapy at a [address] [place] [address] after discharge due to the functional deficits listed above that are likely to improve with skilled rehabilitation and concerns that he/she may be unsafe to be unsupervised at home due to weakness, need for assistance with mobility/transfers, decreased activity tolerance. Equipment:    Tbd pending progress             HISTORY:   History of Present Injury/Illness (Reason for Referral):  Per H&P, \"Mikayla Howard is a 76 y.o. female who came to ER because of abnormal lab test from her primary doctor's office. Patient has been feeling weak for the past 2 or 3 weeks. She went to see her primary doctor. Part of the work-up she was found to have markedly elevated creatinine and liver enzymes she was advised to come to emergency room for evaluation. Patient reports losing weight for the past months. She could not tell me how much she lost in terms of her weight but she knew that she must have lost weight. Appetite is poor. No fever. No shortness of breath. No swelling. She thinks her urine output is decreased as well. No joint pain. No skin rashes. She denies nausea vomiting. No abdominal pain. No blood in urine. No blood in her stool. No hematemesis. No hemoptysis. She also denies sinus symptoms. Dened headache. Patient denies taking over-the-counter medications long-term. No herbal tea. Denied acutely ill from acute illness. No chronic diarrhea. In the emergency room she was found to have significant elevation of creatinine, and liver enzymes. Hospitalist service is requested to admit the patient. Past medical history significant for diabetes mellitus, CAD post stent placement hypertension, , hyperlipidemia. \"  Past Medical History/Comorbidities:   Ms. Eleazar Davies  has a past medical history of Abnormal EKG (12/9/2015), CAD (coronary artery disease) (2009), Chronic pain, Coagulation disorder ([address] Diabetes ([address] (2013), DJD (degenerative joint disease) (6/30/2009), DM2 (diabetes mellitus, type 2) ([address] (1/9/2015), Gastrointestinal disorder, GERD (gastroesophageal reflux disease), Hypertension, Nausea & vomiting, Neuropathy, Other ill-defined conditions(799.89), Palpitations (12/9/2015), Unspecified adverse effect of anesthesia, and Urinary tract infection (1/9/2015). She also has no past medical history of Aneurysm ([address] Arrhythmia, Asthma, Autoimmune disease ([address] Cancer ([address] Chronic kidney disease, Chronic obstructive pulmonary disease ([address] Dementia, Difficult intubation, Heart failure ([address] Liver disease, Malignant hyperthermia due to anesthesia, Morbid obesity ([address] Neurological disorder, Pseudocholinesterase deficiency, Psychiatric disorder, PUD (peptic ulcer disease), Seizures ([address] Sleep disorder, Stroke ([address] Thromboembolus ([address] Thyroid disease, or Unspecified sleep apnea. Ms. Eleazar Davies  has a past surgical history that includes pr cardiac surg procedure unlist; hx appendectomy; hx gyn; hx orthopaedic; hx knee arthroscopy; and hx shoulder replacement. Social History/Living Environment:   Home Environment: Private residence  Wheelchair Ramp: Yes  One/Two Story Residence: One story  Living Alone: No  Support Systems: Child(destini), Family member(s)  Patient Expects to be Discharged to[de-identified] Private residence  Current DME Used/Available at Home: Wheelchair, power, Shower chair  Tub or Shower Type: Shower  Prior Level of Function/Work/Activity:  Lives with daughter and son in law in single story home w/ramp. Mod I using power wc. Some assist w/ADLs and transfer to/from shower chair.    Number of Personal Factors/Comorbidities that affect the Plan of Care: 1-2: MODERATE COMPLEXITY   EXAMINATION:   Most Recent Physical Functioning:   Gross Assessment:                  Posture:     Balance:  Sitting: Impaired  Sitting - Static: Good (unsupported)  Sitting - Dynamic: Fair (occasional)(+) Bed Mobility:  Scooting: Minimum assistance  Interventions: Tactile cues; Safety awareness training;Verbal cues  Wheelchair Mobility:     Transfers:  Bed to Chair: Minimum assistance  Gait:            Body Structures Involved:  1. Metabolic  2. Muscles Body Functions Affected:  1. Movement Related  2. Metobolic/Endocrine Activities and Participation Affected:  1. General Tasks and Demands  2. Mobility  3. Domestic Life  4. Community, Social and [place]   Number of elements that affect the Plan of Care: 4+: HIGH COMPLEXITY   CLINICAL PRESENTATION:   Presentation: Stable and uncomplicated: LOW COMPLEXITY   CLINICAL DECISION MAKIN [facility] Inpatient Short Form  How much difficulty does the patient currently have. .. Unable A Lot A Little None   1. Turning over in bed (including adjusting bedclothes, sheets and blankets)? [] 1   [] 2   [x] 3   [] 4   2. Sitting down on and standing up from a chair with arms ( e.g., wheelchair, bedside commode, etc.)   [] 1   [x] 2   [] 3   [] 4   3. Moving from lying on back to sitting on the side of the bed? [] 1   [] 2   [x] 3   [] 4   How much help from another person does the patient currently need. .. Total A Lot A Little None   4. Moving to and from a bed to a chair (including a wheelchair)? [] 1   [] 2   [x] 3   [] 4   5. Need to walk in hospital room? [x] 1   [] 2   [] 3   [] 4   6. Climbing 3-5 steps with a railing? [x] 1   [] 2   [] 3   [] 4   © , Trustees of [facility] [employer], under license to [employer]. All rights reserved      Score:  Initial: 13 Most Recent: X (Date: -- )    Interpretation of Tool:  Represents activities that are increasingly more difficult (i.e. Bed mobility, Transfers, Gait).     Medical Necessity:     · Patient demonstrates   · good  ·  rehab potential due to higher previous functional level. Reason for Services/Other Comments:  · Patient   · continues to demonstrate capacity to improve strength, mobility, balance, transfers, and activity tolerance which will   · increase independence, decrease amount of assistance required from caregiver, and increase safety  · . Use of outcome tool(s) and clinical judgement create a POC that gives a: Clear prediction of patient's progress: LOW COMPLEXITY            TREATMENT:   (In addition to Assessment/Re-Assessment sessions the following treatments were rendered)   Pre-treatment Symptoms/Complaints:  Very pleasant, agreeable to therapy. Pain: Initial:   Pain Intensity 1: 0  Post Session:  0/10     Therapeutic Exercise: (   30 Minutes):  Functional strengthening exercises/activities including Bed transfers, Chair transfers, wheelchair mobility, sitting balance control training and seated BLE exercises (see chart below) to improve mobility, strength, balance and coordination. Required moderate   to promote static and dynamic balance in sitting and promote coordination of bilateral, upper extremity(s), lower extremity(s). Date:  1/16/2020 Date:   Date:     Activity/Exercise Parameters Parameters Parameters   Seated AP 1 x 20 BLE     Seated LAQ 1 x8 BLE AAROM     Seated Marches 1 x 15 BLE                               Braces/Orthotics/Lines/Etc:   · IV  · O2 Device: Room air  Treatment/Session Assessment:    · Response to Treatment:  Good progress, works well with therapy. · Interdisciplinary Collaboration:   o Physical Therapist  o Registered Nurse  · After treatment position/precautions:   o Supine in bed  o Bed alarm/tab alert on  o Bed/Chair-wheels locked  o Bed in low position  o Call light within reach  o RN notified   · Compliance with Program/Exercises: Will assess as treatment progresses  · Recommendations/Intent for next treatment session:   \"Next visit will focus on advancements to more challenging activities and reduction in assistance provided\".     Total Treatment Duration:  PT Patient Time In/Time Out  Time In: 1323  Time Out: [address], PT

## 2020-01-16 NOTE — PROGRESS NOTES
Problem: Self Care Deficits Care Plan (Adult)  Goal: *Acute Goals and Plan of Care (Insert Text)  Description  1. Patient will complete lower body bathing and dressing with minimal assistance and adaptive equipment as needed. 2. Patient will complete toileting with minimal assistance. 3. Patient will tolerate 30 minutes of OT treatment with 2-3 rest breaks to increase activity tolerance for ADLs. 4. Patient will complete functional transfers to her wheelchair with supervision and adaptive equipment as needed. 5. Patient will attempt a transfer to a [facility] within 3 visits to improve positioning for toileting tasks. 6. Patient will complete therapeutic exercises for 10 minutes to improve strength for ADL/functional transfers. Timeframe: 7 visits      Outcome: Progressing Towards Goal     OCCUPATIONAL THERAPY: Initial Assessment and Daily Note 1/16/2020  INPATIENT: OT Visit Days: 1  Payor: [address] [place] [address],9D / Plan: [address] / Product Type: Managed Care Medicare /      NAME/AGE/GENDER: Kurt Galindo is a 76 y.o. female   PRIMARY DIAGNOSIS:  DEANNA (acute kidney injury) ([facility] [N17.9] DEANNA (acute kidney injury) ([facility] DEANNA (acute kidney injury) ([facility]        ICD-10: Treatment Diagnosis:    Generalized Muscle Weakness (M62.81)  Other lack of cordination (R27.8)  History of falling (Z91.81)   Precautions/Allergies:     Iodinated contrast media; Iothalamate meglumine; Adhesive tape; Codeine; Crestor [rosuvastatin]; Demerol [meperidine]; Dilaudid [hydromorphone (bulk)]; and Oxycodone      ASSESSMENT:     Ms. Humble Feliz presents to the hospital with with DEANNA. Pt reports she typically lives with her daughter. Pt is home alone while her daughter is at work. Pt completes functional transfers to her power wheelchair with modified independence but does admit to several falls. Pt completes ADLs with modified independence but does get assistance from her daughter into her shower. Pt usually transfers herself onto a toilet. Pt was supine in the bed upon arrival. Pt reports 4/10 back pain from being in the bed. Pt is eager to get out of the bed to her power wheelchair. Pt needed total assistance to don socks in supine. Pt reports hx of multiple LE surgeries. Pt completed bed mobility to the edge of the bed with minimal assistance. Pt completed scooting transfer from the bed to her wheelchair with CGA/minimal assistance. Pt was happy to be up in wheelchair. During conversation it was noted that patient has memory loss with pt repeating the same stories and pt having difficulty recalling facts such as if she has a shower or tub/shower at her home, etc. Pt even admitted to difficulty with her memory. Pt completed brushing her teeth from wheelchair position with SBA. Pt reports she needs to use the bathroom but BSC was too low for her. Pt worked on transferring back to bed and then worked on rolling side to side and bridging to place bedpan. Pt needed assistance with hygiene and donning new brief. At the end of the session, pt once again transferred back into the wheelchair with minimal assistance and some decreased safety. Pt was left up in her wheelchair with call bell at her side and tray set-up in front of her. Pt was appreciative of care. Pt did repeat several times throughout session how much weaker she feels. Pt is currently needing more assistance with ADL tasks. Pt will benefit from OT services to address stated goals and plan of care.      This section established at most recent assessment   PROBLEM LIST (Impairments causing functional limitations):  Decreased Strength  Decreased ADL/Functional Activities  Decreased Transfer Abilities  Decreased Ambulation Ability/Technique  Decreased Balance  Increased Pain  Decreased Activity Tolerance  Increased Fatigue  Decreased Flexibility/Joint Mobility  Decreased [place] with Home Exercise Program  Decreased Cognition   INTERVENTIONS PLANNED: (Benefits and precautions of occupational therapy have been discussed with the patient.)  Activities of daily living training  Adaptive equipment training  Balance training  Clothing management  Cognitive training  Donning&doffing training  Neuromuscular re-eduation  Therapeutic activity  Therapeutic exercise  Wheelchair management     TREATMENT PLAN: Frequency/Duration: Follow patient 3 times per week to address above goals. Rehabilitation Potential For Stated Goals: Excellent     REHAB RECOMMENDATIONS (at time of discharge pending progress):    Placement: It is my opinion, based on this patient's performance to date, that Ms. Kaushik Blevins may benefit from intensive therapy at a [address] [place] [address] after discharge due to the functional deficits listed above that are likely to improve with skilled rehabilitation and concerns that he/she may be unsafe to be unsupervised at home due to risk for falls and further functional decline. Equipment:   TBD               OCCUPATIONAL PROFILE AND HISTORY:   History of Present Injury/Illness (Reason for Referral):  See H&P  Past Medical History/Comorbidities:   Ms. Kaushik Blevins  has a past medical history of Abnormal EKG (12/9/2015), CAD (coronary artery disease) (2009), Chronic pain, Coagulation disorder ([address] Diabetes ([address] (2013), DJD (degenerative joint disease) (6/30/2009), DM2 (diabetes mellitus, type 2) ([address] (1/9/2015), Gastrointestinal disorder, GERD (gastroesophageal reflux disease), Hypertension, Nausea & vomiting, Neuropathy, Other ill-defined conditions(799.89), Palpitations (12/9/2015), Unspecified adverse effect of anesthesia, and Urinary tract infection (1/9/2015).  She also has no past medical history of Aneurysm ([address] Arrhythmia, Asthma, Autoimmune disease ([address] Cancer ([address] Chronic kidney disease, Chronic obstructive pulmonary disease ([address] Dementia, Difficult intubation, Heart failure ([address] Liver disease, Malignant hyperthermia due to anesthesia, Morbid obesity ([facility] Neurological disorder, Pseudocholinesterase deficiency, Psychiatric disorder, PUD (peptic ulcer disease), Seizures ([facility] Sleep disorder, Stroke ([facility] Thromboembolus ([facility] Thyroid disease, or Unspecified sleep apnea. Ms. Kaushik Blevins  has a past surgical history that includes pr cardiac surg procedure unlist; hx appendectomy; hx gyn; hx orthopaedic; hx knee arthroscopy; and hx shoulder replacement. Social History/Living Environment:   Home Environment: Private residence  Wheelchair Ramp: Yes  One/Two Story Residence: One story  Living Alone: No  Support Systems: Child(destini)  Patient Expects to be Discharged to[de-identified] Private residence  Current DME Used/Available at Home: Shower chair, Wheelchair, power  Tub or Shower Type: Shower  Prior Level of Function/Work/Activity:  Pt reports she typically lives with her daughter. Pt is home alone while her daughter is at work. Pt completes functional transfers to her power wheelchair with modified independence but does admit to several falls. Pt completes ADLs with modified independence but does get assistance from her daughter into her shower. Pt usually transfers herself onto a toilet. Personal Factors:          Social Background:  Home alone while her daughter is at work        Overall Behavior:  memory loss         Other factors that influence how disability is experienced by the patient:  multiple co-morbidities    Number of Personal Factors/Comorbidities that affect the Plan of Care: Extensive review of physical, cognitive, and psychosocial performance (3+):  HIGH COMPLEXITY   ASSESSMENT OF OCCUPATIONAL PERFORMANCE[de-identified]   Activities of Daily Living:   Basic ADLs (From Assessment) Complex ADLs (From Assessment)   Feeding: Setup  Oral Facial Hygiene/Grooming: Stand-by assistance  Bathing: Moderate assistance  Upper Body Dressing: Stand-by assistance  Lower Body Dressing:  Moderate assistance  Toileting: Maximum assistance Instrumental ADL  Meal Preparation: Total assistance  Homemaking: Total assistance   Grooming/Bathing/Dressing Activities of Daily Living   Grooming  Brushing Teeth: Stand-by assistance;Minimum assistance Cognitive Retraining  Safety/Judgement: Fall prevention           Toileting  Toileting Assistance: Maximum assistance  Bladder Hygiene: Maximum assistance  Clothing Management: Maximum assistance  Cues: Tactile cues provided;Verbal cues provided;Visual cues provided         Lower Body Dressing Assistance  Socks: Total assistance (dependent) Bed/Mat Mobility  Rolling: Minimum assistance  Supine to Sit: Minimum assistance  Sit to Supine: Minimum assistance  Bed to Chair: Minimum assistance  Scooting: Minimum assistance     Most Recent Physical Functioning:   Gross Assessment:  AROM: Generally decreased, functional  Strength: Generally decreased, functional  Coordination: Generally decreased, functional               Posture:  Posture (WDL): Exceptions to WDL  Posture Assessment: Forward head, Rounded shoulders  Balance:  Sitting: Impaired  Sitting - Static: Good (unsupported)  Sitting - Dynamic: Fair (occasional) Bed Mobility:  Rolling: Minimum assistance  Supine to Sit: Minimum assistance  Sit to Supine: Minimum assistance  Scooting: Minimum assistance  Interventions: Tactile cues; Safety awareness training;Verbal cues  Wheelchair Mobility:     Transfers:  Bed to Chair: Minimum assistance  Interventions: Safety awareness training; Tactile cues; Verbal cues            Patient Vitals for the past 6 hrs:   BP SpO2 Pulse   01/16/20 1110 142/72 97 % 88       Mental Status  Neurologic State: Alert  Orientation Level: Oriented to person  Cognition: Decreased attention/concentration, Follows commands, Memory loss  Perception: Appears intact  Perseveration: No perseveration noted  Safety/Judgement: Fall prevention                          Physical Skills Involved:  Range of Motion  Balance  Strength  Activity Tolerance  Pain (Chronic) Cognitive Skills Affected (resulting in the inability to perform in a timely and safe manner):  Executive Function  Short Term Recall  Divided Attention Psychosocial Skills Affected:  Habits/Routines  Self-Awareness   Number of elements that affect the Plan of Care: 5+:  HIGH COMPLEXITY   CLINICAL DECISION MAKIN[address] [place] [address] AM-PAC 6 Clicks   Daily Activity Inpatient Short Form  How much help from another person does the patient currently need. .. Total A Lot A Little None   1. Putting on and taking off regular lower body clothing? [] 1   [x] 2   [] 3   [] 4   2. Bathing (including washing, rinsing, drying)? [] 1   [x] 2   [] 3   [] 4   3. Toileting, which includes using toilet, bedpan or urinal?   [] 1   [x] 2   [] 3   [] 4   4. Putting on and taking off regular upper body clothing? [] 1   [] 2   [x] 3   [] 4   5. Taking care of personal grooming such as brushing teeth? [] 1   [] 2   [x] 3   [] 4   6. Eating meals? [] 1   [] 2   [x] 3   [] 4   © 2007, Trustees of [address] [place] [address], under license to [employer]. All rights reserved      Score:  Initial: 15 Most Recent: X (Date: -- )    Interpretation of Tool:  Represents activities that are increasingly more difficult (i.e. Bed mobility, Transfers, Gait). Medical Necessity:     Patient demonstrates   good   rehab potential due to higher previous functional level. Reason for Services/Other Comments:  Patient continues to require skilled intervention due to   Decreased independence with ADL/functional transfers    .    Use of outcome tool(s) and clinical judgement create a POC that gives a: LOW COMPLEXITY         TREATMENT:   (In addition to Assessment/Re-Assessment sessions the following treatments were rendered)     Pre-treatment Symptoms/Complaints:    Pain: Initial:   Pain Intensity 1: 4  Pain Location 1: Back  Pain Intervention(s) 1: Repositioned  Post Session:  no pain observed     Self Care: (30): Procedure(s) (per grid) utilized to improve and/or restore self-care/home management as related to dressing, toileting, and grooming. Required maximal visual, verbal, manual, and tactile cueing to facilitate toileting and minimal assistance with grooming tasks. Neuromuscular Re-education: ( 10 minutes):  Exercise/activities included sitting balance and side scooting with pt working on weight shifts and transitioning over into her wheelchair x 3 improve balance, coordination, and posture. Required minimal visual, verbal, manual, and tactile cues to promote motor control of bilateral, upper extremity(s), lower extremity(s). Braces/Orthotics/Lines/Etc:   O2 Device: Room air  Treatment/Session Assessment:    Response to Treatment:  Pt tolerated it well with good participation and minimal complaints. Interdisciplinary Collaboration:   Occupational Therapist  Registered Nurse  After treatment position/precautions:   Call light within reach  RN notified  Up in power wheelchair   Compliance with Program/Exercises: Will assess as treatment progresses. Recommendations/Intent for next treatment session: \"Next visit will focus on advancements to more challenging activities and reduction in assistance provided\".   Total Treatment Duration:  OT Patient Time In/Time Out  Time In: 1128  Time Out: Hina Gunn OT

## 2020-01-16 NOTE — PROGRESS NOTES
MIDLINE Placement Note    PRE-PROCEDURE VERIFICATION  PROCEDURE DETAIL  Time out completed with Jacques Lewis, LILY VAT and everyone in agreement with procedure. A single lumen Midline was started for vascular access.  The following documentation is in addition to the Midline properties in the lines/airways flowsheet :  Lot #: [id number]  Xylocaine used: YES  Mid-Arm Circumference: 36 (cm)  Internal Catheter Length: 12 (cm)  Internal Catheter Total Length: 12 (cm)  Vein Selection for Midline:left brachial     Line is okay to use: YES

## 2020-01-16 NOTE — PROGRESS NOTES
Progress Note    Patient: Christine Palacios MRN: [id number]  SSN: [id number]    YOB: 1945  Age: 76 y.o. Sex: female      Admit Date: 1/8/2020    LOS: 8 days     Subjective:   She feels weak and sore. I explained she will go to [facility] once stable. The patient lost IV access. Objective:     Vitals:    01/15/20 1821 01/15/20 2026 01/15/20 2330 01/16/20 0431   BP: 157/76 134/61 148/69 148/73   Pulse: 70 65 63 67   Resp: 19 16 16 16   Temp: 99.1 °F (37.3 °C) 98.6 °F (37 °C) 98.3 °F (36.8 °C) 98.5 °F (36.9 °C)   SpO2:  96% 94% 94%   Weight:       Height:            Intake and Output:  Current Shift: No intake/output data recorded. Last three shifts: No intake/output data recorded. Physical Exam:     General:                    MYSVB weak    HHJV:                                   VSHVVEGLPYPLP/CZZSULCSWX. Eyes:                                   palpebral pallor, no scleral icterus. ENT:                                    External auricular and nasal exam within normal limits.                                             IIADDS membranes are moist.  Neck:                                   Supple, non-tender, no JVD. Lungs:                       Clear to auscultation bilaterally without wheezes or crackles.                                             No respiratory distress or accessory muscle use. Heart:                                  Regular rate and rhythm, without murmurs, rubs, or gallops. Abdomen:                  Soft, non-tender, non-distended with normoactive bowel sounds. Genitourinary:           No tenderness over the bladder or bilateral CVAs. Extremities:               Without clubbing, cyanosis, or edema. Skin:                                    Normal color, texture, and turgor. No rashes, lesions, or jaundice. Pulses:                      Radial and dorsalis pedis pulses present 2+ bilaterally.                                               Capillary refill <2s. Neurologic:                CN II-XII grossly intact and symmetrical.                                               Moving all four extremities well with no focal deficits. Psychiatric:                Pleasant demeanor, appropriate affect.  Alert and oriented x 3    Lab/Data Review:    Recent Results (from the past 24 hour(s))   GLUCOSE, POC    Collection Time: 01/15/20 10:55 AM   Result Value Ref Range    Glucose (POC) 102 (H) 65 - 100 mg/dL   TYPE + CROSSMATCH    Collection Time: 01/15/20 11:15 AM   Result Value Ref Range    Crossmatch Expiration 01/18/2020     ABO/Rh(D) A POSITIVE     Antibody screen NEG     Unit number [id number]     Blood component type RC LR     Unit division 00     Status of unit TRANSFUSED     Crossmatch result Compatible    PLEASE READ & DOCUMENT PPD TEST IN 24 HRS    Collection Time: 01/15/20 12:11 PM   Result Value Ref Range    PPD      mm Induration 0 0 - 5 mm   GLUCOSE, POC    Collection Time: 01/15/20  3:21 PM   Result Value Ref Range    Glucose (POC) 147 (H) 65 - 100 mg/dL   GLUCOSE, POC    Collection Time: 01/15/20  9:46 PM   Result Value Ref Range    Glucose (POC) 106 (H) 65 - 100 mg/dL   HGB & HCT    Collection Time: 01/15/20 10:24 PM   Result Value Ref Range    HGB 9.4 (L) 11.7 - 15.4 g/dL    HCT 28.4 (L) 35.8 - 46.3 %   RENAL FUNCTION PANEL    Collection Time: 01/16/20  6:39 AM   Result Value Ref Range    Sodium 142 136 - 145 mmol/L    Potassium 3.4 (L) 3.5 - 5.1 mmol/L    Chloride 109 (H) 98 - 107 mmol/L    CO2 23 21 - 32 mmol/L    Anion gap 10 7 - 16 mmol/L    Glucose 84 65 - 100 mg/dL    BUN 59 (H) 8 - 23 MG/DL    Creatinine 6.99 (H) 0.6 - 1.0 MG/DL    GFR est AA 7 (L) >60 ml/min/1.73m2    GFR est non-AA 6 (L) >60 ml/min/1.73m2    Calcium 9.2 8.3 - 10.4 MG/DL    Phosphorus 6.4 (H) 2.3 - 3.7 MG/DL    Albumin 2.2 (L) 3.2 - 4.6 g/dL   HGB & HCT    Collection Time: 01/16/20  6:39 AM   Result Value Ref Range    HGB 8.6 (L) 11.7 - 15.4 g/dL    HCT 26.1 (L) 35.8 - 46.3 % GLUCOSE, POC    Collection Time: 01/16/20  7:41 AM   Result Value Ref Range    Glucose (POC) 81 65 - 100 mg/dL     US retroperitoneum  1-9-2020    IMPRESSION:     1. Diffusely increased renal cortical echogenicity bilaterally, most likely  related to medical renal disease.     2. No evidence of obstructive uropathy.     3. 0.4 cm right renal calculus.       Current Facility-Administered Medications:     0.9% sodium chloride infusion 250 mL, 250 mL, IntraVENous, PRN, Kayli Shah MD    acetaminophen (TYLENOL) tablet 500 mg, 500 mg, Oral, Q6H PRN, Felicitas Mendieta MD, 500 mg at 01/15/20 1828    hydrOXYzine pamoate (VISTARIL) capsule 25 mg, 25 mg, Oral, TID PRN, Josr Adkins MD    famotidine (PEPCID) tablet 20 mg, 20 mg, Oral, DAILY, Kayli Shah MD, 20 mg at 01/15/20 0908    [Held by provider] clopidogrel (PLAVIX) tablet 75 mg, 75 mg, Oral, DAILY, Kayli Shah MD  [facility] by provider] aspirin delayed-release tablet 81 mg, 81 mg, Oral, QHS, Kayli Shah MD, 81 mg at 01/14/20 2239    nitroglycerin (NITROSTAT) tablet 0.4 mg, 0.4 mg, SubLINGual, PRN, Kayli Shah MD    polyethylene glycol [facility]) packet 17 g, 17 g, Oral, DAILY PRN, Dexter Cruz MD    0.9% sodium chloride infusion, 25 mL/hr, IntraVENous, CONTINUOUS, Felicitas Mendieta MD, Last Rate: 25 mL/hr at 01/15/20 2200, 25 mL/hr at 01/15/20 2200    sodium chloride (NS) flush 5-40 mL, 5-40 mL, IntraVENous, PRN, Jerardo Dhillon PA    sodium chloride (NS) flush 5-40 mL, 5-40 mL, IntraVENous, Q8H, Argelia Patel MD, 10 mL at 01/15/20 1352    sodium chloride (NS) flush 5-40 mL, 5-40 mL, IntraVENous, PRN, Argelia Patel MD    ondansetron (ZOFRAN) injection 4 mg, 4 mg, IntraVENous, Q6H PRN, Argelia Patel MD    bisacodyl (DULCOLAX) tablet 5 mg, 5 mg, Oral, DAILY PRN, Argelia Patel MD    insulin lispro (HUMALOG) injection, , SubCUTAneous, AC&HS, Dexter Cruz MD, Stopped at 01/13/20 1630      Assessment:     Principal Problem:    DEANNA (acute kidney injury) ([address] (1/8/2020)    Active Problems:    CAD (coronary artery disease) (2/8/2018)      Hypertension (6/30/2009)      Hyperlipidemia (6/30/2009)      GERD (gastroesophageal reflux disease) (6/30/2009)      Coronary atherosclerosis of native coronary artery (7/23/2009)      Overview: 7/2009 - PCI of the RCA 3.5x33 and 3.5x18 Cypher      7/2009 - PCI of the LAD 2.5x18 Cypher, LCX 2.75x13 Cypher with kissing       POBA of small  OM1      06/2013:  Stress MPI with no ischemia and small inferior apical fixed       defect in patient with prior IMI      06/2013:  Normal echo      04/2014:  Stable CAD and FFR LAD 0.88      01/2018:  PCI mLAD for ISR - 2.5x28 [facility]                  DM2 (diabetes mellitus, type 2) ([address] (1/9/2015)      Anemia (1/10/2015)      Elevated liver enzymes (1/8/2020)      Non-traumatic rhabdomyolysis (1/9/2020)      DNR (do not resuscitate) (1/14/2020)        Plan:     -Acute anemia:  Possible CORTEZ after iron study   She denies lower gi bleeding  S/p 1 prbc. Hb is stable     -Non oliguric Acute kidney injury on CKD:   -possible medication induced / rhabdomyolysis:   Cr slowly improving. No HD criteria   She has elevated Lamda light chains. Continue strict IO  She lost IV access this morning. Multiple attempts to try and obtain a line. I discussed case with Dr. Arvind Preciado over the phone and she suggested patient to continue IVFs for now  Will put an order for picc / midline   Nephrology f/u. Daily chemistry    -Hypokalemia: replace 40 po     -Non traumatic rhabdomyolysis:improved   Medication induced possible     -Elevated LFTs:  Normal hepatitis panel  Medication induced: statins have been held   GI consulted. They signed off     -Weakness:  PT/OT. Candidate for STR. CM on board     -diabetes mellitus type 2:   Monitor blood sugar  Cover with insulin sliding scale accordingly.     off metformin for now due to her recent poor kidney function.      -Hypertension: Avoid ACE inhibitor for now.      -Hyperlipidemia: Avoid statin for now due to hepatitis. -CAD: asa and plavix         DVT prophylaxis : SCD     Code status: DNR/DNI.     Discharge planning: STR once approved     Signed By: Andrés Bryant MD     January 16, 2020

## 2020-01-16 NOTE — PROGRESS NOTES
checked in on Pt. Pt grateful for  visit. Does not have a home [facility]. Son is not doing well either and that is weighing on Pt. Pt is not doing as well as she would like to be, but has spoke to God about this.  offered prayer with pt. No additional needs at this time. Please consult Spiritual Care as needed. Starla Barber, [address].

## 2020-01-16 NOTE — PROGRESS NOTES
[facility]        Subjective: DEANNA     Patient seen and examined     Labs and chart reviewed- renal indices slowly improving     Review of Systems -   General ROS: negative for - fever, chills  Respiratory ROS: no SOB, cough, SYED  Cardiovascular ROS: no CP, palpitations  Gastrointestinal ROS: no N&V, abdominal pain, diarrhea  Genito-Urinary ROS: no difficulty voiding, dysuria  Neurological ROS: no seizures, focal weekness        Objective:    Vitals:    01/15/20 2330 01/16/20 0431 01/16/20 0814 01/16/20 1110   BP: 148/69 148/73 136/76 142/72   Pulse: 63 67 72 88   Resp: 16 16 16 16   Temp: 98.3 °F (36.8 °C) 98.5 °F (36.9 °C) 98.2 °F (36.8 °C) 98.1 °F (36.7 °C)   SpO2: 94% 94% 96% 97%   Weight:       Height:           PE  Gen: in no acute distress, alert and oriented  CV:reg rate, S1, S2, No Rub  Chest:clear bilaterally, no wheezes  Abd: soft, non tender, + BS  Ext/Access: no edema       . LAB  Recent Labs     01/16/20  0639 01/15/20  2224 01/15/20  0557 01/14/20  0941   WBC  --   --  6.6 7.5   HGB 8.6* 9.4* 6.7* 7.9*   HCT 26.1* 28.4* 20.1* 23.9*   PLT  --   --  256 301     Recent Labs     01/16/20  0639 01/15/20  0557 01/14/20  1233 01/14/20  0941    143 140 143   K 3.4* 3.4* 3.3* 3.3*   * 109* 106 107   CO2 23 24 25 26   GLU 84 97 97 98   BUN 59* 64* 58* 58*   CREA 6.99* 7.19* 7.28* 7.37*   PHOS 6.4* 7.0*  --   --    CA 9.2 8.5 8.6 8.9   ALB 2.2* 1.9*  --  2.1*   TBILI  --   --   --  0.5   ALT  --   --   --  175*   SGOT  --   --   --  76*           Radiology    A/P:   Patient Active Problem List   Diagnosis Code    Hypertension I10    Hyperlipidemia E78.5    DJD (degenerative joint disease) M19.90    GERD (gastroesophageal reflux disease) K21.9    Coronary atherosclerosis of native coronary artery I25.10    Other specified arthropathy, shoulder region M12.819    Nontraumatic rupture of tendons of biceps (long head) M66.329    DM2 (diabetes mellitus, type 2) (HCC) E11.9    Anemia D64.9    Anemia associated with acute blood loss D62    Palpitations R00.2    Abnormal EKG R94.31    Neuropathy G62.9    CAD (coronary artery disease) I25.10    DEANNA (acute kidney injury) (HCC) N17.9    Elevated liver enzymes R74.8    Non-traumatic rhabdomyolysis M62.82    DNR (do not resuscitate) Z66       DEANNA  - rhabdomyolysis, Creatinine slowly trending down, non oliguric.   -P/C/ratio 2.7, renal imaging with evidence of CKD, no hydronephrosis, 0.4 right renal calculus. CK elevated >14,000->10,291->2,023->1,045, SPEP no M spike,   -continue gentle IVF, follow renal function and quantify uop, no indication for acute RRT at this time.     CLBP needs PT     Abnormal LFT's- improving    HTN- continue antihypertensives    Anemia on Fe PO, PRBC today per primary     DM  Continue to monitor her recovery     Saritha Guerrero MD

## 2020-01-16 NOTE — PROGRESS NOTES
Assuming Primary care of Pt at this time. Bedside report received from [facility]. Pt was assisted to w/c by PT. Pt did not stay long in chair but stated she felt better doing this. Midline placed to left arm. IV fluids reinitiated. Report given to Carlos Mendez RN.

## 2020-01-16 NOTE — PROGRESS NOTES
Hourly rounds completed this shift. Patient resting in bed. IV pulled out by patient, VAT notified. All needs met at this time. Will continue to monitor and give report to oncoming RN.

## 2020-01-16 NOTE — PROGRESS NOTES
CM informed the pt that [facility] has provided a bed offer. Pt would like to accept [facility] bed offer. CM informed liaison with [facility]. CM will continue to provide updates to liaison and inform facility when pt is medically stable for discharge. CM continues to follow pt to assist with discharge planning.

## 2020-01-17 LAB
ANION GAP SERPL CALC-SCNC: 8 MMOL/L (ref 7–16)
BASOPHILS # BLD: 0 K/UL (ref 0–0.2)
BASOPHILS NFR BLD: 0 % (ref 0–2)
BUN SERPL-MCNC: 61 MG/DL (ref 8–23)
CALCIUM SERPL-MCNC: 8.8 MG/DL (ref 8.3–10.4)
CHLORIDE SERPL-SCNC: 112 MMOL/L (ref 98–107)
CO2 SERPL-SCNC: 24 MMOL/L (ref 21–32)
CREAT SERPL-MCNC: 6.86 MG/DL (ref 0.6–1)
DIFFERENTIAL METHOD BLD: ABNORMAL
EOSINOPHIL # BLD: 0.2 K/UL (ref 0–0.8)
EOSINOPHIL NFR BLD: 2 % (ref 0.5–7.8)
ERYTHROCYTE [DISTWIDTH] IN BLOOD BY AUTOMATED COUNT: 18.4 % (ref 11.9–14.6)
GLUCOSE BLD STRIP.AUTO-MCNC: 104 MG/DL (ref 65–100)
GLUCOSE BLD STRIP.AUTO-MCNC: 73 MG/DL (ref 65–100)
GLUCOSE BLD STRIP.AUTO-MCNC: 83 MG/DL (ref 65–100)
GLUCOSE BLD STRIP.AUTO-MCNC: 87 MG/DL (ref 65–100)
GLUCOSE BLD STRIP.AUTO-MCNC: 97 MG/DL (ref 65–100)
GLUCOSE SERPL-MCNC: 87 MG/DL (ref 65–100)
HCT VFR BLD AUTO: 23.2 % (ref 35.8–46.3)
HGB BLD-MCNC: 7.4 G/DL (ref 11.7–15.4)
IMM GRANULOCYTES # BLD AUTO: 0 K/UL (ref 0–0.5)
IMM GRANULOCYTES NFR BLD AUTO: 0 % (ref 0–5)
LYMPHOCYTES # BLD: 1.2 K/UL (ref 0.5–4.6)
LYMPHOCYTES NFR BLD: 18 % (ref 13–44)
MCH RBC QN AUTO: 29.6 PG (ref 26.1–32.9)
MCHC RBC AUTO-ENTMCNC: 31.9 G/DL (ref 31.4–35)
MCV RBC AUTO: 92.8 FL (ref 79.6–97.8)
MONOCYTES # BLD: 0.7 K/UL (ref 0.1–1.3)
MONOCYTES NFR BLD: 11 % (ref 4–12)
NEUTS SEG # BLD: 4.6 K/UL (ref 1.7–8.2)
NEUTS SEG NFR BLD: 68 % (ref 43–78)
NRBC # BLD: 0 K/UL (ref 0–0.2)
PLATELET # BLD AUTO: 299 K/UL (ref 150–450)
PMV BLD AUTO: 11.5 FL (ref 9.4–12.3)
POTASSIUM SERPL-SCNC: 3.8 MMOL/L (ref 3.5–5.1)
RBC # BLD AUTO: 2.5 M/UL (ref 4.05–5.2)
SODIUM SERPL-SCNC: 144 MMOL/L (ref 136–145)
WBC # BLD AUTO: 6.7 K/UL (ref 4.3–11.1)

## 2020-01-17 PROCEDURE — 82962 GLUCOSE BLOOD TEST: CPT

## 2020-01-17 PROCEDURE — 74011250636 HC RX REV CODE- 250/636: Performed by: INTERNAL MEDICINE

## 2020-01-17 PROCEDURE — 77030020263 HC SOL INJ SOD CL0.9% LFCR 1000ML

## 2020-01-17 PROCEDURE — 74011250637 HC RX REV CODE- 250/637: Performed by: INTERNAL MEDICINE

## 2020-01-17 PROCEDURE — 65270000029 HC RM PRIVATE

## 2020-01-17 PROCEDURE — 80048 BASIC METABOLIC PNL TOTAL CA: CPT

## 2020-01-17 PROCEDURE — 85025 COMPLETE CBC W/AUTO DIFF WBC: CPT

## 2020-01-17 RX ORDER — CARBOXYMETHYLCELLULOSE SODIUM 10 MG/ML
1 GEL OPHTHALMIC AS NEEDED
Status: DISCONTINUED | OUTPATIENT
Start: 2020-01-17 | End: 2020-01-20 | Stop reason: HOSPADM

## 2020-01-17 RX ADMIN — FAMOTIDINE 20 MG: 20 TABLET, FILM COATED ORAL at 07:55

## 2020-01-17 RX ADMIN — Medication 10 ML: at 13:49

## 2020-01-17 RX ADMIN — SODIUM CHLORIDE 75 ML/HR: 900 INJECTION, SOLUTION INTRAVENOUS at 05:01

## 2020-01-17 RX ADMIN — ACETAMINOPHEN 500 MG: 500 TABLET, FILM COATED ORAL at 07:55

## 2020-01-17 RX ADMIN — Medication 10 ML: at 06:20

## 2020-01-17 RX ADMIN — Medication 10 ML: at 22:00

## 2020-01-17 RX ADMIN — ACETAMINOPHEN 500 MG: 500 TABLET, FILM COATED ORAL at 22:15

## 2020-01-17 RX ADMIN — HYDROXYZINE PAMOATE 25 MG: 25 CAPSULE ORAL at 22:15

## 2020-01-17 NOTE — PROGRESS NOTES
Hourly rounding completed. No new complaints. All needs met. Pt is currently resting in bed. Will continue to monitor and give report to oncoming nurse.

## 2020-01-17 NOTE — PROGRESS NOTES
Progress Note    Patient: Nitin Hernandez MRN: [id number]  SSN: [id number]    YOB: 1945  Age: 76 y.o. Sex: female      Admit Date: 1/8/2020    LOS: 9 days     Subjective: The patient feels weak. She was encouraged to participate with PT/OT as much as possible. She was told her creatinine function is much improved and that at the moment we are staying away from hemodialysis. Objective:     Vitals:    01/16/20 1632 01/16/20 1939 01/17/20 0055 01/17/20 0427   BP: 138/68 137/68 135/70 130/66   Pulse: 78 67 91 89   Resp: 16 18 18 18   Temp: 98 °F (36.7 °C) 98.9 °F (37.2 °C) 98.8 °F (37.1 °C) 98.6 °F (37 °C)   SpO2: 96% 95% 95% 95%   Weight:       Height:            Intake and Output:  Current Shift: No intake/output data recorded. Last three shifts: No intake/output data recorded. Physical Exam:     General:                    FMVYN weak    Head:                         Normocephalic/atraumatic. Eyes:                                   palpebral pallor, no scleral icterus. ENT:                                    External auricular and nasal exam within normal limits.                                             QNXWKS membranes are moist.  Neck:                                   Supple, non-tender, no JVD. Lungs:                       Clear to auscultation bilaterally without wheezes or crackles.                                             No respiratory distress or accessory muscle use. Heart:                                  Regular rate and rhythm, without murmurs, rubs, or gallops. Abdomen:                  Soft, non-tender, non-distended with normoactive bowel sounds. Genitourinary:           No tenderness over the bladder or bilateral CVAs. Extremities:               Without clubbing, cyanosis, or edema. Skin:                                    Normal color, texture, and turgor. No rashes, lesions, or jaundice.   Pulses:                      Radial and dorsalis pedis pulses present 2+ bilaterally.                                               Capillary refill <2s. Neurologic:                CN II-XII grossly intact and symmetrical.                                               Moving all four extremities well with no focal deficits. Psychiatric:                Pleasant demeanor, appropriate affect.  Alert and oriented x 3    Lab/Data Review:    Recent Results (from the past 24 hour(s))   GLUCOSE, POC    Collection Time: 01/16/20 10:36 AM   Result Value Ref Range    Glucose (POC) 127 (H) 65 - 100 mg/dL   PLEASE READ & DOCUMENT PPD TEST IN 48 HRS    Collection Time: 01/16/20 12:50 PM   Result Value Ref Range    PPD Negative Negative    mm Induration 0 0 - 5 mm   GLUCOSE, POC    Collection Time: 01/16/20  3:59 PM   Result Value Ref Range    Glucose (POC) 97 65 - 100 mg/dL   GLUCOSE, POC    Collection Time: 01/16/20  9:44 PM   Result Value Ref Range    Glucose (POC) 89 65 - 248 mg/dL   METABOLIC PANEL, BASIC    Collection Time: 01/17/20  4:15 AM   Result Value Ref Range    Sodium 144 136 - 145 mmol/L    Potassium 3.8 3.5 - 5.1 mmol/L    Chloride 112 (H) 98 - 107 mmol/L    CO2 24 21 - 32 mmol/L    Anion gap 8 7 - 16 mmol/L    Glucose 87 65 - 100 mg/dL    BUN 61 (H) 8 - 23 MG/DL    Creatinine 6.86 (H) 0.6 - 1.0 MG/DL    GFR est AA 8 (L) >60 ml/min/1.73m2    GFR est non-AA 6 (L) >60 ml/min/1.73m2    Calcium 8.8 8.3 - 10.4 MG/DL   CBC WITH AUTOMATED DIFF    Collection Time: 01/17/20  4:15 AM   Result Value Ref Range    WBC 6.7 4.3 - 11.1 K/uL    RBC 2.50 (L) 4.05 - 5.2 M/uL    HGB 7.4 (L) 11.7 - 15.4 g/dL    HCT 23.2 (L) 35.8 - 46.3 %    MCV 92.8 79.6 - 97.8 FL    MCH 29.6 26.1 - 32.9 PG    MCHC 31.9 31.4 - 35.0 g/dL    RDW 18.4 (H) 11.9 - 14.6 %    PLATELET 930 943 - 625 K/uL    MPV 11.5 9.4 - 12.3 FL    ABSOLUTE NRBC 0.00 0.0 - 0.2 K/uL    DF AUTOMATED      NEUTROPHILS 68 43 - 78 %    LYMPHOCYTES 18 13 - 44 %    MONOCYTES 11 4.0 - 12.0 %    EOSINOPHILS 2 0.5 - 7.8 %    BASOPHILS 0 0.0 - 2.0 %    IMMATURE GRANULOCYTES 0 0.0 - 5.0 %    ABS. NEUTROPHILS 4.6 1.7 - 8.2 K/UL    ABS. LYMPHOCYTES 1.2 0.5 - 4.6 K/UL    ABS. MONOCYTES 0.7 0.1 - 1.3 K/UL    ABS. EOSINOPHILS 0.2 0.0 - 0.8 K/UL    ABS. BASOPHILS 0.0 0.0 - 0.2 K/UL    ABS. IMM. GRANS. 0.0 0.0 - 0.5 K/UL     US retroperitoneum  1-9-2020    IMPRESSION:     1. Diffusely increased renal cortical echogenicity bilaterally, most likely  related to medical renal disease.     2. No evidence of obstructive uropathy.     3. 0.4 cm right renal calculus.       Current Facility-Administered Medications:     ondansetron (ZOFRAN ODT) tablet 8 mg, 8 mg, Oral, Q6H PRN, Sharmin Cole MD, 8 mg at 01/16/20 6103    0.9% sodium chloride infusion, 75 mL/hr, IntraVENous, CONTINUOUS, Sharmin Cole MD, Last Rate: 75 mL/hr at 01/17/20 0501, 75 mL/hr at 01/17/20 0501    0.9% sodium chloride infusion 250 mL, 250 mL, IntraVENous, PRN, Sharmin Cole MD    acetaminophen (TYLENOL) tablet 500 mg, 500 mg, Oral, Q6H PRN, Lisa Diggs MD, 500 mg at 01/16/20 2135    hydrOXYzine pamoate (VISTARIL) capsule 25 mg, 25 mg, Oral, TID PRN, Saima Lawrence MD    famotidine (PEPCID) tablet 20 mg, 20 mg, Oral, DAILY, Sharmin Cole MD, 20 mg at 01/16/20 0914    [Held by provider] clopidogrel (PLAVIX) tablet 75 mg, 75 mg, Oral, DAILY, Sharmin Cole MD    [facility] [place] by provider] aspirin delayed-release tablet 81 mg, 81 mg, Oral, QHS, Sharmin Cole MD, 81 mg at 01/14/20 9143    nitroglycerin (NITROSTAT) tablet 0.4 mg, 0.4 mg, SubLINGual, PRN, Sharmin Cole MD    polyethylene glycol [facility]) packet 17 g, 17 g, Oral, DAILY PRN, Argelia Patel MD    sodium chloride (NS) flush 5-40 mL, 5-40 mL, IntraVENous, PRN, Krystle Soda, PA, 5 mL at 01/16/20 1752    sodium chloride (NS) flush 5-40 mL, 5-40 mL, IntraVENous, Q8H, Argelia Patel MD, 10 mL at 01/17/20 0620    sodium chloride (NS) flush 5-40 mL, 5-40 mL, IntraVENous, PRN, Argelia Patel, MD, 10 mL at 01/16/20 0845    ondansetron (ZOFRAN) injection 4 mg, 4 mg, IntraVENous, Q6H PRN, Argelia Patel MD    bisacodyl (DULCOLAX) tablet 5 mg, 5 mg, Oral, DAILY PRN, Yana Cruz MD    insulin lispro (HUMALOG) injection, , SubCUTAneous, AC&HS, Yana Cruz MD, Stopped at 01/13/20 1630      Assessment:     Principal Problem:    DEANNA (acute kidney injury) ([facility] (1/8/2020)    Active Problems:    CAD (coronary artery disease) (2/8/2018)      Hypertension (6/30/2009)      Hyperlipidemia (6/30/2009)      GERD (gastroesophageal reflux disease) (6/30/2009)      Coronary atherosclerosis of native coronary artery (7/23/2009)      Overview: 7/2009 - PCI of the RCA 3.5x33 and 3.5x18 Cypher      7/2009 - PCI of the LAD 2.5x18 Cypher, LCX 2.75x13 Cypher with kissing       POBA of small  OM1      06/2013:  Stress MPI with no ischemia and small inferior apical fixed       defect in patient with prior IMI      06/2013:  Normal echo      04/2014:  Stable CAD and FFR LAD 0.88      01/2018:  PCI mLAD for ISR - 2.5x28 [facility]                  DM2 (diabetes mellitus, type 2) ([facility] (1/9/2015)      Anemia (1/10/2015)      Elevated liver enzymes (1/8/2020)      Non-traumatic rhabdomyolysis (1/9/2020)      DNR (do not resuscitate) (1/14/2020)        Plan:     -Acute anemia:  Possible CORTEZ after iron study   She denies lower gi bleeding  S/p 1 prbc. Hb is stable     -Non oliguric Acute kidney injury on CKD:   -possible medication induced / rhabdomyolysis:   Cr slowly improving. No HD criteria   Continue strict IO  IVFs today. May stop fluids tomorrow   Nephrology f/u. Daily chemistry    -Non traumatic rhabdomyolysis:improved   Medication induced possible     -Elevated LFTs:  Normal hepatitis panel  Medication induced: statins have been held   GI consulted. They signed off     -Weakness:  PT/OT. Candidate for STR.  CM on board     -diabetes mellitus type 2:   Monitor blood sugar  Cover with insulin sliding scale accordingly. off metformin for now due to her recent poor kidney function.      -Hypertension: Avoid ACE inhibitor for now.      -Hyperlipidemia: Avoid statin for now due to hepatitis. -CAD: asa and plavix         DVT prophylaxis : SCD     Code status: DNR/DNI.     Discharge planning: STR Monday     Signed By: Minh Taylor MD     January 17, 2020

## 2020-01-17 NOTE — PROGRESS NOTES
Messaged MD, Nancy Body to make aware of H&H values to see if she would like to change how often labs are completed to q8. MD stated to keep it at q daily.

## 2020-01-17 NOTE — PROGRESS NOTES
[facility]        Subjective: DEANNA     Patient seen and examined     Labs and chart reviewed- renal indices slowly improving     Review of Systems -   General ROS: negative for - fever, chills  Respiratory ROS: no SOB, cough, SYED  Cardiovascular ROS: no CP, palpitations  Gastrointestinal ROS: no N&V, abdominal pain, diarrhea  Genito-Urinary ROS: no difficulty voiding, dysuria  Neurological ROS: no seizures, focal weekness        Objective:    Vitals:    01/16/20 1939 01/17/20 0055 01/17/20 0427 01/17/20 0830   BP: 137/68 135/70 130/66 126/62   Pulse: 67 91 89 78   Resp: 18 18 18 18   Temp: 98.9 °F (37.2 °C) 98.8 °F (37.1 °C) 98.6 °F (37 °C) 98.4 °F (36.9 °C)   SpO2: 95% 95% 95% 96%   Weight:       Height:           PE  Gen: in no acute distress, alert and oriented  CV:reg rate, S1, S2, No Rub  Chest:clear bilaterally, no wheezes  Abd: soft, non tender, + BS  Ext/Access: no edema       . LAB  Recent Labs     01/17/20  0415 01/16/20  0639 01/15/20  2224 01/15/20  0557   WBC 6.7  --   --  6.6   HGB 7.4* 8.6* 9.4* 6.7*   HCT 23.2* 26.1* 28.4* 20.1*     --   --  256     Recent Labs     01/17/20  0415 01/16/20  0639 01/15/20  0557    142 143   K 3.8 3.4* 3.4*   * 109* 109*   CO2 24 23 24   GLU 87 84 97   BUN 61* 59* 64*   CREA 6.86* 6.99* 7.19*   PHOS  --  6.4* 7.0*   CA 8.8 9.2 8.5   ALB  --  2.2* 1.9*           Radiology    A/P:   Patient Active Problem List   Diagnosis Code    Hypertension I10    Hyperlipidemia E78.5    DJD (degenerative joint disease) M19.90    GERD (gastroesophageal reflux disease) K21.9    Coronary atherosclerosis of native coronary artery I25.10    Other specified arthropathy, shoulder region M12.819    Nontraumatic rupture of tendons of biceps (long head) M66.329    DM2 (diabetes mellitus, type 2) (HCC) E11.9    Anemia D64.9    Anemia associated with acute blood loss D62    Palpitations R00.2    Abnormal EKG R94.31    Neuropathy G62.9    CAD (coronary artery disease) I25.10    DEANNA (acute kidney injury) ([facility] N17.9    Elevated liver enzymes R74.8    Non-traumatic rhabdomyolysis M62.82    DNR (do not resuscitate) Z66       DEANNA  - rhabdomyolysis, Creatinine slowly trending down, non oliguric.   -P/C/ratio 2.7, renal imaging with evidence of CKD, no hydronephrosis, 0.4 right renal calculus. CK elevated >14,000->10,291->2,023->1,045, SPEP no M spike,   -continue gentle IVF, follow renal function and quantify uop, no indication for acute RRT at this time.   - Stop fluids when PO intake improves    CLBP needs PT     Abnormal LFT's- improving    HTN- continue antihypertensives    Anemia on Fe PO, PRBC today per primary     DM  Continue to monitor her recovery     Edin Villa MD

## 2020-01-17 NOTE — PROGRESS NOTES
Patient has slept well overnight with medications and treatments. Her midline was used this am to draw labs and she is tolerating this well. She has been observed on hourly rounds and has voiced no complaints during shift.

## 2020-01-17 NOTE — PROGRESS NOTES
CM met with pt to confirm CM is able to contact pt's daughter to request paperwork to be completed at [facility], to assist the pt with STR. Liaison with [facility] stated the pt can discharge to facility Monday, due to paperwork not being completed at this time. Liaison stated she attempted to contact family, but was unsuccessful at reaching the pt's daughter. CM informed pt's daughter to complete paperwork Monday, requested by liaison. CM will inform  weekend staff. CM continues to follow pt.

## 2020-01-18 LAB
ANION GAP SERPL CALC-SCNC: 14 MMOL/L (ref 7–16)
BUN SERPL-MCNC: 55 MG/DL (ref 8–23)
CALCIUM SERPL-MCNC: 8.9 MG/DL (ref 8.3–10.4)
CHLORIDE SERPL-SCNC: 114 MMOL/L (ref 98–107)
CO2 SERPL-SCNC: 19 MMOL/L (ref 21–32)
CREAT SERPL-MCNC: 6.59 MG/DL (ref 0.6–1)
GLUCOSE BLD STRIP.AUTO-MCNC: 174 MG/DL (ref 65–100)
GLUCOSE BLD STRIP.AUTO-MCNC: 72 MG/DL (ref 65–100)
GLUCOSE BLD STRIP.AUTO-MCNC: 75 MG/DL (ref 65–100)
GLUCOSE BLD STRIP.AUTO-MCNC: 84 MG/DL (ref 65–100)
GLUCOSE BLD STRIP.AUTO-MCNC: 96 MG/DL (ref 65–100)
GLUCOSE SERPL-MCNC: 70 MG/DL (ref 65–100)
HCT VFR BLD AUTO: 23.3 % (ref 35.8–46.3)
HGB BLD-MCNC: 7.6 G/DL (ref 11.7–15.4)
POTASSIUM SERPL-SCNC: 3.4 MMOL/L (ref 3.5–5.1)
SODIUM SERPL-SCNC: 147 MMOL/L (ref 136–145)

## 2020-01-18 PROCEDURE — 74011250637 HC RX REV CODE- 250/637: Performed by: INTERNAL MEDICINE

## 2020-01-18 PROCEDURE — 80048 BASIC METABOLIC PNL TOTAL CA: CPT

## 2020-01-18 PROCEDURE — 36415 COLL VENOUS BLD VENIPUNCTURE: CPT

## 2020-01-18 PROCEDURE — 65270000029 HC RM PRIVATE

## 2020-01-18 PROCEDURE — 74011636637 HC RX REV CODE- 636/637: Performed by: INTERNAL MEDICINE

## 2020-01-18 PROCEDURE — 82962 GLUCOSE BLOOD TEST: CPT

## 2020-01-18 PROCEDURE — 74011250636 HC RX REV CODE- 250/636: Performed by: INTERNAL MEDICINE

## 2020-01-18 PROCEDURE — 85018 HEMOGLOBIN: CPT

## 2020-01-18 RX ORDER — AMLODIPINE BESYLATE 5 MG/1
5 TABLET ORAL DAILY
Status: DISCONTINUED | OUTPATIENT
Start: 2020-01-18 | End: 2020-01-19

## 2020-01-18 RX ORDER — POTASSIUM CHLORIDE 20 MEQ/1
20 TABLET, EXTENDED RELEASE ORAL
Status: COMPLETED | OUTPATIENT
Start: 2020-01-18 | End: 2020-01-18

## 2020-01-18 RX ORDER — HYDRALAZINE HYDROCHLORIDE 20 MG/ML
10 INJECTION INTRAMUSCULAR; INTRAVENOUS
Status: DISCONTINUED | OUTPATIENT
Start: 2020-01-18 | End: 2020-01-20 | Stop reason: HOSPADM

## 2020-01-18 RX ORDER — SODIUM CHLORIDE 9 MG/ML
75 INJECTION, SOLUTION INTRAVENOUS CONTINUOUS
Status: DISCONTINUED | OUTPATIENT
Start: 2020-01-18 | End: 2020-01-19

## 2020-01-18 RX ORDER — DEXTROSE 50 % IN WATER (D50W) INTRAVENOUS SYRINGE
25 AS NEEDED
Status: DISCONTINUED | OUTPATIENT
Start: 2020-01-18 | End: 2020-01-20 | Stop reason: HOSPADM

## 2020-01-18 RX ADMIN — AMLODIPINE BESYLATE 5 MG: 5 TABLET ORAL at 09:05

## 2020-01-18 RX ADMIN — ONDANSETRON 8 MG: 4 TABLET, ORALLY DISINTEGRATING ORAL at 21:17

## 2020-01-18 RX ADMIN — ONDANSETRON 8 MG: 4 TABLET, ORALLY DISINTEGRATING ORAL at 08:49

## 2020-01-18 RX ADMIN — POTASSIUM CHLORIDE 20 MEQ: 20 TABLET, EXTENDED RELEASE ORAL at 09:05

## 2020-01-18 RX ADMIN — Medication 10 ML: at 21:21

## 2020-01-18 RX ADMIN — HYDRALAZINE HYDROCHLORIDE 10 MG: 20 INJECTION INTRAMUSCULAR; INTRAVENOUS at 10:05

## 2020-01-18 RX ADMIN — INSULIN LISPRO 2 UNITS: 100 INJECTION, SOLUTION INTRAVENOUS; SUBCUTANEOUS at 16:30

## 2020-01-18 RX ADMIN — ACETAMINOPHEN 500 MG: 500 TABLET, FILM COATED ORAL at 21:17

## 2020-01-18 RX ADMIN — SODIUM CHLORIDE 75 ML/HR: 900 INJECTION, SOLUTION INTRAVENOUS at 21:17

## 2020-01-18 RX ADMIN — SODIUM CHLORIDE 75 ML/HR: 900 INJECTION, SOLUTION INTRAVENOUS at 12:51

## 2020-01-18 RX ADMIN — Medication 10 ML: at 14:17

## 2020-01-18 RX ADMIN — Medication 10 ML: at 06:15

## 2020-01-18 RX ADMIN — FAMOTIDINE 20 MG: 20 TABLET, FILM COATED ORAL at 08:43

## 2020-01-18 NOTE — PROGRESS NOTES
[facility]        Subjective: DEANNA     Patient seen and examined     Labs and chart reviewed- renal indices slowly improving     Review of Systems -   General ROS: negative for - fever, chills  Respiratory ROS: no SOB, cough, SYED  Cardiovascular ROS: no CP, palpitations  Gastrointestinal ROS: no N&V, abdominal pain, diarrhea  Genito-Urinary ROS: no difficulty voiding, dysuria  Neurological ROS: no seizures, focal weekness        Objective:    Vitals:    01/18/20 0655 01/18/20 0846 01/18/20 1005 01/18/20 1037   BP: 182/90 180/84 (!) 174/93 163/90   Pulse: 70 66 74 70   Resp: 20   20   Temp: 98.3 °F (36.8 °C)   98.1 °F (36.7 °C)   SpO2: 94% 96%  97%   Weight:       Height:           PE  Gen: in no acute distress, alert and oriented  CV:reg rate, S1, S2, No Rub  Chest:clear bilaterally, no wheezes  Abd: soft, non tender, + BS  Ext/Access: no edema       . LAB  Recent Labs     01/18/20  0443 01/17/20  0415 01/16/20  0639   WBC  --  6.7  --    HGB 7.6* 7.4* 8.6*   HCT 23.3* 23.2* 26.1*   PLT  --  299  --      Recent Labs     01/18/20  0401 01/17/20  0415 01/16/20  0639   * 144 142   K 3.4* 3.8 3.4*   * 112* 109*   CO2 19* 24 23   GLU 70 87 84   BUN 55* 61* 59*   CREA 6.59* 6.86* 6.99*   PHOS  --   --  6.4*   CA 8.9 8.8 9.2   ALB  --   --  2.2*           Radiology    A/P:   Patient Active Problem List   Diagnosis Code    Hypertension I10    Hyperlipidemia E78.5    DJD (degenerative joint disease) M19.90    GERD (gastroesophageal reflux disease) K21.9    Coronary atherosclerosis of native coronary artery I25.10    Other specified arthropathy, shoulder region M12.819    Nontraumatic rupture of tendons of biceps (long head) M66.329    DM2 (diabetes mellitus, type 2) ([facility]) E11.9    Anemia D64.9    Anemia associated with acute blood loss D62    Palpitations R00.2    Abnormal EKG R94.31    Neuropathy G62.9    CAD (coronary artery disease) I25.10    DEANNA (acute kidney injury) (HCC) N17.9    Elevated liver enzymes R74.8    Non-traumatic rhabdomyolysis M62.82    DNR (do not resuscitate) Z66       DEANNA  - rhabdomyolysis, Creatinine slowly trending down, non oliguric.   -P/C/ratio 2.7, renal imaging with evidence of CKD, no hydronephrosis, 0.4 right renal calculus. CK elevated >14,000->10,291->2,023->1,045, SPEP no M spike,   - continues to slowly improve. - Stop fluids when PO intake improves  - discussed with patient and family at bedside. Can liberalize diet to anything she will eat.     CLBP needs PT     Abnormal LFT's- improving    HTN- continue antihypertensives    Anemia on Fe PO, PRBC today per primary     DM  Continue to monitor her recovery     Calos Kauffman MD

## 2020-01-18 NOTE — PROGRESS NOTES
Progress Note    Patient: Emma Palomino MRN: [id number]  SSN: [id number]    YOB: 1945  Age: 76 y.o. Sex: female      Admit Date: 1/8/2020    LOS: 10 days     Subjective:   She was found in no distress. Multiple family members present. All questions answered. The patient feels weak, has decreased appetite. Objective:     Vitals:    01/17/20 1906 01/18/20 0010 01/18/20 0429 01/18/20 0655   BP: 146/67 142/79 151/71 182/90   Pulse: 65 61 70 70   Resp: 18 18 18 20   Temp: 98.1 °F (36.7 °C) 98.3 °F (36.8 °C) 98.1 °F (36.7 °C) 98.3 °F (36.8 °C)   SpO2: 94% 97% 95% 94%   Weight:       Height:            Intake and Output:  Current Shift: No intake/output data recorded. Last three shifts: No intake/output data recorded. Physical Exam:     General:                    YGZZB weak    Head:                         Normocephalic/atraumatic. Eyes:                                   palpebral pallor, no scleral icterus. ENT:                                    External auricular and nasal exam within normal limits.                                             SULBDP membranes are moist.  Neck:                                   Supple, non-tender, no JVD. Lungs:                       Clear to auscultation bilaterally without wheezes or crackles.                                             No respiratory distress or accessory muscle use. Heart:                                  Regular rate and rhythm, without murmurs, rubs, or gallops. Abdomen:                  Soft, non-tender, non-distended with normoactive bowel sounds. Genitourinary:           No tenderness over the bladder or bilateral CVAs. Extremities:               Without clubbing, cyanosis, or edema. Skin:                                    Normal color, texture, and turgor. No rashes, lesions, or jaundice. Pulses:                      Radial and dorsalis pedis pulses present 2+ bilaterally.                                             Capillary refill <2s. Neurologic:                CN II-XII grossly intact and symmetrical.                                               Moving all four extremities well with no focal deficits. Psychiatric:                Pleasant demeanor, appropriate affect. Alert and oriented x 3    Lab/Data Review:    Recent Results (from the past 24 hour(s))   GLUCOSE, POC    Collection Time: 01/17/20 10:48 AM   Result Value Ref Range    Glucose (POC) 83 65 - 100 mg/dL   GLUCOSE, POC    Collection Time: 01/17/20  4:08 PM   Result Value Ref Range    Glucose (POC) 87 65 - 100 mg/dL   GLUCOSE, POC    Collection Time: 01/17/20  8:44 PM   Result Value Ref Range    Glucose (POC) 104 (H) 65 - 084 mg/dL   METABOLIC PANEL, BASIC    Collection Time: 01/18/20  4:01 AM   Result Value Ref Range    Sodium 147 (H) 136 - 145 mmol/L    Potassium 3.4 (L) 3.5 - 5.1 mmol/L    Chloride 114 (H) 98 - 107 mmol/L    CO2 19 (L) 21 - 32 mmol/L    Anion gap 14 7 - 16 mmol/L    Glucose 70 65 - 100 mg/dL    BUN 55 (H) 8 - 23 MG/DL    Creatinine 6.59 (H) 0.6 - 1.0 MG/DL    GFR est AA 8 (L) >60 ml/min/1.73m2    GFR est non-AA 7 (L) >60 ml/min/1.73m2    Calcium 8.9 8.3 - 10.4 MG/DL   HGB & HCT    Collection Time: 01/18/20  4:43 AM   Result Value Ref Range    HGB 7.6 (L) 11.7 - 15.4 g/dL    HCT 23.3 (L) 35.8 - 46.3 %   GLUCOSE, POC    Collection Time: 01/18/20  6:54 AM   Result Value Ref Range    Glucose (POC) 84 65 - 100 mg/dL     US retroperitoneum  1-9-2020    IMPRESSION:     1. Diffusely increased renal cortical echogenicity bilaterally, most likely  related to medical renal disease.     2. No evidence of obstructive uropathy.     3. 0.4 cm right renal calculus.       Current Facility-Administered Medications:     carboxymethylcellulose sodium (REFRESH LIQUIGEL) 1 % ophthalmic solution 1 Drop, 1 Drop, Both Eyes, PRN, [facility]    [facility]) tablet 8 mg, 8 mg, Oral, Q6H PRN, [facility], Carter Zaragoza MD, 8 mg at 01/16/20 4189    0.9% sodium chloride infusion, 75 mL/hr, IntraVENous, CONTINUOUS, Sharmin Cole MD, Last Rate: 75 mL/hr at 01/17/20 0501, 75 mL/hr at 01/17/20 0501    0.9% sodium chloride infusion 250 mL, 250 mL, IntraVENous, PRN, Sharmin Cole MD    acetaminophen (TYLENOL) tablet 500 mg, 500 mg, Oral, Q6H PRN, Lisa Diggs MD, 500 mg at 01/17/20 2215    hydrOXYzine pamoate (VISTARIL) capsule 25 mg, 25 mg, Oral, TID PRN, Saima Lawrence MD, 25 mg at 01/17/20 2215    famotidine (PEPCID) tablet 20 mg, 20 mg, Oral, DAILY, Sharmin Cole MD, 20 mg at 01/18/20 0843    [Held by provider] clopidogrel (PLAVIX) tablet 75 mg, 75 mg, Oral, DAILY, Sharmin Cole MD  [facility] by provider] aspirin delayed-release tablet 81 mg, 81 mg, Oral, QHS, Sharmin Cole MD, 81 mg at 01/14/20 2239    nitroglycerin (NITROSTAT) tablet 0.4 mg, 0.4 mg, SubLINGual, PRN, Sharmin Cole MD    polyethylene glycol [facility]) packet 17 g, 17 g, Oral, DAILY PRN, Argelia Patel MD    sodium chloride (NS) flush 5-40 mL, 5-40 mL, IntraVENous, PRN, KAREL Olson, 5 mL at 01/16/20 1752    sodium chloride (NS) flush 5-40 mL, 5-40 mL, IntraVENous, Q8H, Argelia Patel MD, 10 mL at 01/18/20 0615    sodium chloride (NS) flush 5-40 mL, 5-40 mL, IntraVENous, PRN, Luanne Sanchez MD, 10 mL at 01/16/20 0845    ondansetron (ZOFRAN) injection 4 mg, 4 mg, IntraVENous, Q6H PRN, Argelia Patel MD    bisacodyl (DULCOLAX) tablet 5 mg, 5 mg, Oral, DAILY PRN, Luanne Sanchez MD    insulin lispro (HUMALOG) injection, , SubCUTAneous, AC&HS, Luanne Sanchez MD, Stopped at 01/13/20 1630      Assessment:     Principal Problem:    DEANNA (acute kidney injury) ([facility] [address] (1/8/2020)    Active Problems:    CAD (coronary artery disease) (2/8/2018)      Hypertension (6/30/2009)      Hyperlipidemia (6/30/2009)      GERD (gastroesophageal reflux disease) (6/30/2009)      Coronary atherosclerosis of native coronary artery (7/23/2009)      Overview: 7/2009 - PCI of the RCA 3.5x33 and 3.5x18 Cypher      7/2009 - PCI of the LAD 2.5x18 Cypher, LCX 2.75x13 Cypher with kissing       POBA of small  OM1      06/2013:  Stress MPI with no ischemia and small inferior apical fixed       defect in patient with prior IMI      06/2013:  Normal echo      04/2014:  Stable CAD and FFR LAD 0.88      01/2018:  PCI mLAD for ISR - 2.5x28 Synergy                  DM2 (diabetes mellitus, type 2) ([facility] [address] (1/9/2015)      Anemia (1/10/2015)      Elevated liver enzymes (1/8/2020)      Non-traumatic rhabdomyolysis (1/9/2020)      DNR (do not resuscitate) (1/14/2020)        Plan:     -Non oliguric Acute kidney injury on CKD:   -possible medication induced / rhabdomyolysis:   Cr slowly improving. No HD criteria   Continue strict IO  Continue IVFs today, will DC fluids tomorrow   Nephrology f/u. Daily chemistry    -Acute anemia:  Possible CORTEZ after iron study   She denies lower gi bleeding  S/p 1 prbc. Hb is stable     -Non traumatic rhabdomyolysis:improved   Medication induced possible     -Elevated LFTs:  Normal hepatitis panel  Medication induced: statins have been held   GI consulted. They signed off     -Weakness:  PT/OT. Candidate for STR. CM on board     -diabetes mellitus type 2:   Monitor blood sugar  Cover with insulin sliding scale accordingly. off metformin for now due to her recent poor kidney function.      -Hypertension: Avoid ACE inhibitor for now.      -Hyperlipidemia: Avoid statin for now due to hepatitis. -CAD: asa and plavix         DVT prophylaxis : SCD     Code status: DNR/DNI.     Discharge planning: STR Monday     Signed By: Mitch Santiago MD     January 18, 2020

## 2020-01-18 NOTE — ROUTINE PROCESS
Patient has been observed during hourly rounds and she has rested fairly well overnight, with medications and treatments. Patient was given warm bath prior to sleeping.

## 2020-01-19 LAB
ANION GAP SERPL CALC-SCNC: 7 MMOL/L (ref 7–16)
BUN SERPL-MCNC: 50 MG/DL (ref 8–23)
CALCIUM SERPL-MCNC: 9 MG/DL (ref 8.3–10.4)
CHLORIDE SERPL-SCNC: 116 MMOL/L (ref 98–107)
CO2 SERPL-SCNC: 23 MMOL/L (ref 21–32)
CREAT SERPL-MCNC: 6.27 MG/DL (ref 0.6–1)
GLUCOSE BLD STRIP.AUTO-MCNC: 107 MG/DL (ref 65–100)
GLUCOSE BLD STRIP.AUTO-MCNC: 83 MG/DL (ref 65–100)
GLUCOSE BLD STRIP.AUTO-MCNC: 86 MG/DL (ref 65–100)
GLUCOSE BLD STRIP.AUTO-MCNC: 97 MG/DL (ref 65–100)
GLUCOSE SERPL-MCNC: 96 MG/DL (ref 65–100)
POTASSIUM SERPL-SCNC: 3.9 MMOL/L (ref 3.5–5.1)
SODIUM SERPL-SCNC: 146 MMOL/L (ref 136–145)

## 2020-01-19 PROCEDURE — 82962 GLUCOSE BLOOD TEST: CPT

## 2020-01-19 PROCEDURE — 74011250637 HC RX REV CODE- 250/637: Performed by: INTERNAL MEDICINE

## 2020-01-19 PROCEDURE — 74011250636 HC RX REV CODE- 250/636: Performed by: INTERNAL MEDICINE

## 2020-01-19 PROCEDURE — 80048 BASIC METABOLIC PNL TOTAL CA: CPT

## 2020-01-19 PROCEDURE — 65270000029 HC RM PRIVATE

## 2020-01-19 RX ORDER — AMLODIPINE BESYLATE 10 MG/1
10 TABLET ORAL DAILY
Status: DISCONTINUED | OUTPATIENT
Start: 2020-01-20 | End: 2020-01-20 | Stop reason: HOSPADM

## 2020-01-19 RX ADMIN — AMLODIPINE BESYLATE 5 MG: 5 TABLET ORAL at 09:09

## 2020-01-19 RX ADMIN — HYDROXYZINE PAMOATE 25 MG: 25 CAPSULE ORAL at 21:10

## 2020-01-19 RX ADMIN — FAMOTIDINE 20 MG: 20 TABLET, FILM COATED ORAL at 09:09

## 2020-01-19 RX ADMIN — Medication 10 ML: at 21:17

## 2020-01-19 RX ADMIN — Medication 10 ML: at 05:50

## 2020-01-19 RX ADMIN — HYDRALAZINE HYDROCHLORIDE 10 MG: 20 INJECTION INTRAMUSCULAR; INTRAVENOUS at 20:10

## 2020-01-19 RX ADMIN — ACETAMINOPHEN 500 MG: 500 TABLET, FILM COATED ORAL at 21:10

## 2020-01-19 RX ADMIN — Medication 10 ML: at 14:00

## 2020-01-19 NOTE — PROGRESS NOTES
[facility]        Subjective: DEANNA     Patient seen and examined     Labs and chart reviewed- renal indices slowly improving     Review of Systems -   General ROS: negative for - fever, chills  Respiratory ROS: no SOB, cough, SYED  Cardiovascular ROS: no CP, palpitations  Gastrointestinal ROS: no N&V, abdominal pain, diarrhea  Genito-Urinary ROS: no difficulty voiding, dysuria  Neurological ROS: no seizures, focal weekness        Objective:    Vitals:    01/18/20 2333 01/19/20 0320 01/19/20 0846 01/19/20 1017   BP: 141/76 132/88 (!) 183/100 159/76   Pulse: 69 77 70 71   Resp: 19 16 18    Temp: 98.4 °F (36.9 °C) 98.1 °F (36.7 °C) 99.1 °F (37.3 °C)    SpO2: 97% 94% 96%    Weight:       Height:           PE  Gen: in no acute distress, alert and oriented  CV:reg rate, S1, S2, No Rub  Chest:clear bilaterally, no wheezes  Abd: soft, non tender, + BS  Ext/Access: no edema       . LAB  Recent Labs     01/18/20  0443 01/17/20  0415   WBC  --  6.7   HGB 7.6* 7.4*   HCT 23.3* 23.2*   PLT  --  299     Recent Labs     01/19/20  0636 01/18/20  0401 01/17/20  0415   * 147* 144   K 3.9 3.4* 3.8   * 114* 112*   CO2 23 19* 24   GLU 96 70 87   BUN 50* 55* 61*   CREA 6.27* 6.59* 6.86*   CA 9.0 8.9 8.8           Radiology    A/P:   Patient Active Problem List   Diagnosis Code    Hypertension I10    Hyperlipidemia E78.5    DJD (degenerative joint disease) M19.90    GERD (gastroesophageal reflux disease) K21.9    Coronary atherosclerosis of native coronary artery I25.10    Other specified arthropathy, shoulder region M12.819    Nontraumatic rupture of tendons of biceps (long head) M66.329    DM2 (diabetes mellitus, type 2) (HCC) E11.9    Anemia D64.9    Anemia associated with acute blood loss D62    Palpitations R00.2    Abnormal EKG R94.31    Neuropathy G62.9    CAD (coronary artery disease) I25.10    DEANNA (acute kidney injury) (HCC) N17.9    Elevated liver enzymes R74.8    Non-traumatic rhabdomyolysis M62.82    DNR (do not resuscitate) Z66       DEANNA  - rhabdomyolysis, Creatinine slowly trending down, non oliguric.   -P/C/ratio 2.7, renal imaging with evidence of CKD, no hydronephrosis, 0.4 right renal calculus. CK elevated >14,000->10,291->2,023->1,045, SPEP no M spike,   - continues to slowly improve. CLBP needs PT     Abnormal LFT's- improving    HTN- continue antihypertensives    Anemia on Fe PO, PRBC today per primary     DM    She continues to improve slowly. Sodium a little high, she does not have much of a thirst mechanism. Will encourage PO fluids; she likes to eat ice.      Justen Coley MD

## 2020-01-19 NOTE — PROGRESS NOTES
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

## 2020-01-19 NOTE — PROGRESS NOTES
Progress Note    Patient: Yadi Head MRN: [id number]  SSN: [id number]    YOB: 1945  Age: 76 y.o. Sex: female      Admit Date: 1/8/2020    LOS: 11 days     Subjective:   She had no complains. Her cr function is slowly improving. She has good urine output     Objective:     Vitals:    01/19/20 0320 01/19/20 0846 01/19/20 1017 01/19/20 1234   BP: 132/88 (!) 183/100 159/76 154/81   Pulse: 77 70 71 68   Resp: 16 18 18   Temp: 98.1 °F (36.7 °C) 99.1 °F (37.3 °C)  98.7 °F (37.1 °C)   SpO2: 94% 96%  95%   Weight:       Height:            Intake and Output:  Current Shift: No intake/output data recorded. Last three shifts: 01/17 1901 - 01/19 0700  In: 240 [P.O.:240]  Out: -     Physical Exam:     General:                    ZKNHI weak    Head:                         Normocephalic/atraumatic. Eyes:                                   palpebral pallor, no scleral icterus. ENT:                                    External auricular and nasal exam within normal limits.                                             DCGZXV membranes are moist.  Neck:                                   Supple, non-tender, no JVD. Lungs:                       Clear to auscultation bilaterally without wheezes or crackles.                                             No respiratory distress or accessory muscle use. Heart:                                  Regular rate and rhythm, without murmurs, rubs, or gallops. Abdomen:                  Soft, non-tender, non-distended with normoactive bowel sounds. Genitourinary:           No tenderness over the bladder or bilateral CVAs. Extremities:               Without clubbing, cyanosis, or edema. Skin:                                    Normal color, texture, and turgor. No rashes, lesions, or jaundice. Pulses:                      Radial and dorsalis pedis pulses present 2+ bilaterally.                                               Capillary refill <2s. Neurologic:                CN II-XII grossly intact and symmetrical.                                               Moving all four extremities well with no focal deficits. Psychiatric:                Pleasant demeanor, appropriate affect. Alert and oriented x 3    Lab/Data Review:    Recent Results (from the past 24 hour(s))   GLUCOSE, POC    Collection Time: 01/18/20  4:07 PM   Result Value Ref Range    Glucose (POC) 174 (H) 65 - 100 mg/dL   GLUCOSE, POC    Collection Time: 01/18/20  8:28 PM   Result Value Ref Range    Glucose (POC) 75 65 - 075 mg/dL   METABOLIC PANEL, BASIC    Collection Time: 01/19/20  6:36 AM   Result Value Ref Range    Sodium 146 (H) 136 - 145 mmol/L    Potassium 3.9 3.5 - 5.1 mmol/L    Chloride 116 (H) 98 - 107 mmol/L    CO2 23 21 - 32 mmol/L    Anion gap 7 7 - 16 mmol/L    Glucose 96 65 - 100 mg/dL    BUN 50 (H) 8 - 23 MG/DL    Creatinine 6.27 (H) 0.6 - 1.0 MG/DL    GFR est AA 8 (L) >60 ml/min/1.73m2    GFR est non-AA 7 (L) >60 ml/min/1.73m2    Calcium 9.0 8.3 - 10.4 MG/DL   GLUCOSE, POC    Collection Time: 01/19/20  8:06 AM   Result Value Ref Range    Glucose (POC) 97 65 - 100 mg/dL   GLUCOSE, POC    Collection Time: 01/19/20 12:43 PM   Result Value Ref Range    Glucose (POC) 83 65 - 100 mg/dL     US retroperitoneum  1-9-2020    IMPRESSION:     1. Diffusely increased renal cortical echogenicity bilaterally, most likely  related to medical renal disease.     2. No evidence of obstructive uropathy.     3. 0.4 cm right renal calculus.       Current Facility-Administered Medications:     amLODIPine (NORVASC) tablet 5 mg, 5 mg, Oral, DAILY, Humaira Hood MD, 5 mg at 01/19/20 0909    hydrALAZINE (APRESOLINE) 20 mg/mL injection 10 mg, 10 mg, IntraVENous, Q6H PRN, Humaira Hood MD, 10 mg at 01/18/20 1005    dextrose (D50W) injection syrg 25 g, 25 g, IntraVENous, PRN, Humaira Hood MD    carboxymethylcellulose sodium (REFRESH LIQUIGEL) 1 % ophthalmic solution 1 Drop, 1 Drop, Both Eyes, PRN, America Powell MD    ondansetron [facility] ODT) tablet 8 mg, 8 mg, Oral, Q6H PRN, America Powell MD, 8 mg at 01/18/20 2117    0.9% sodium chloride infusion 250 mL, 250 mL, IntraVENous, PRN, America Powell MD    acetaminophen (TYLENOL) tablet 500 mg, 500 mg, Oral, Q6H PRN, Troy Still MD, 500 mg at 01/18/20 2117    hydrOXYzine pamoate (VISTARIL) capsule 25 mg, 25 mg, Oral, TID PRN, Matthew Peralta MD, 25 mg at 01/17/20 2215    famotidine (PEPCID) tablet 20 mg, 20 mg, Oral, DAILY, America Powell MD, 20 mg at 01/19/20 0909    [Held by provider] clopidogrel (PLAVIX) tablet 75 mg, 75 mg, Oral, DAILY, America Powell MD    [facility] provider] aspirin delayed-release tablet 81 mg, 81 mg, Oral, QHS, America Powell MD, 81 mg at 01/14/20 2239    nitroglycerin (NITROSTAT) tablet 0.4 mg, 0.4 mg, SubLINGual, PRN, America Powell MD    polyethylene glycol [facility]) packet 17 g, 17 g, Oral, DAILY PRN, Argelia Patel MD    sodium chloride (NS) flush 5-40 mL, 5-40 mL, IntraVENous, PRN, KAREL Ambriz, 5 mL at 01/16/20 1752    sodium chloride (NS) flush 5-40 mL, 5-40 mL, IntraVENous, Q8H, Argelia Patel MD, 10 mL at 01/19/20 0550    sodium chloride (NS) flush 5-40 mL, 5-40 mL, IntraVENous, PRN, Argelia Patel MD, 10 mL at 01/16/20 0845    ondansetron (ZOFRAN) injection 4 mg, 4 mg, IntraVENous, Q6H PRN, Argelia Patel MD    bisacodyl (DULCOLAX) tablet 5 mg, 5 mg, Oral, DAILY PRN, Roberto Gonzalez MD    insulin lispro (HUMALOG) injection, , SubCUTAneous, AC&HS, Roberto Gonzalez MD, Stopped at 01/18/20 0141      Assessment:     Principal Problem:    DEANNA (acute kidney injury) ([facility] [address] (1/8/2020)    Active Problems:    CAD (coronary artery disease) (2/8/2018)      Hypertension (6/30/2009)      Hyperlipidemia (6/30/2009)      GERD (gastroesophageal reflux disease) (6/30/2009)      Coronary atherosclerosis of native coronary artery (7/23/2009)      Overview: 7/2009 - PCI of the RCA 3.5x33 and 3.5x18 Cypher      7/2009 - PCI of the LAD 2.5x18 Cypher, LCX 2.75x13 Cypher with kissing       POBA of small  OM1      06/2013:  Stress MPI with no ischemia and small inferior apical fixed       defect in patient with prior IMI      06/2013:  Normal echo      04/2014:  Stable CAD and FFR LAD 0.88      01/2018:  PCI mLAD for ISR - 2.5x28 Synergy                  DM2 (diabetes mellitus, type 2) ([facility] [address] (1/9/2015)      Anemia (1/10/2015)      Elevated liver enzymes (1/8/2020)      Non-traumatic rhabdomyolysis (1/9/2020)      DNR (do not resuscitate) (1/14/2020)        Plan:     -Non oliguric Acute kidney injury on CKD:   -possible medication induced / rhabdomyolysis:   Cr slowly improving. No HD criteria   Continue strict IO  Discontinue iv fluids and encouraged on po water intake  Nephrology f/u. Daily chemistry    -Acute anemia:  Possible CORTEZ after iron study   She denies lower gi bleeding  S/p 1 prbc. Hb is stable     -Non traumatic rhabdomyolysis:improved   Medication induced possible     -Elevated LFTs:  Normal hepatitis panel  Medication induced: statins have been held   GI consulted. They signed off     -Weakness:  PT/OT. Candidate for STR. CM on board     -diabetes mellitus type 2:   Monitor blood sugar  Cover with insulin sliding scale accordingly. off metformin for now due to her recent poor kidney function.      -Hypertension: Avoid ACE inhibitor for now. Started on norvasc,  Now on 10 mg daily      -Hyperlipidemia: Avoid statin for now due to hepatitis. -CAD: asa and plavix         DVT prophylaxis : SCD     Code status: DNR/DNI.     Discharge planning: STR tomorrow     Signed By: Candice Hansen MD     January 19, 2020

## 2020-01-19 NOTE — PROGRESS NOTES
Referral from staff  Patient sleeping  Will try later    Shonna Cruz, staff [address], [address] [facility]  /   [email or web address]

## 2020-01-20 VITALS
DIASTOLIC BLOOD PRESSURE: 89 MMHG | SYSTOLIC BLOOD PRESSURE: 150 MMHG | RESPIRATION RATE: 18 BRPM | HEIGHT: 66 IN | TEMPERATURE: 98.1 F | WEIGHT: 188 LBS | BODY MASS INDEX: 30.22 KG/M2 | HEART RATE: 79 BPM | OXYGEN SATURATION: 95 %

## 2020-01-20 LAB
ANION GAP SERPL CALC-SCNC: 12 MMOL/L (ref 7–16)
BUN SERPL-MCNC: 54 MG/DL (ref 8–23)
CALCIUM SERPL-MCNC: 9.1 MG/DL (ref 8.3–10.4)
CHLORIDE SERPL-SCNC: 114 MMOL/L (ref 98–107)
CO2 SERPL-SCNC: 20 MMOL/L (ref 21–32)
CREAT SERPL-MCNC: 6.13 MG/DL (ref 0.6–1)
GLUCOSE BLD STRIP.AUTO-MCNC: 105 MG/DL (ref 65–100)
GLUCOSE BLD STRIP.AUTO-MCNC: 105 MG/DL (ref 65–100)
GLUCOSE SERPL-MCNC: 96 MG/DL (ref 65–100)
POTASSIUM SERPL-SCNC: 3.4 MMOL/L (ref 3.5–5.1)
SODIUM SERPL-SCNC: 146 MMOL/L (ref 136–145)

## 2020-01-20 PROCEDURE — 36415 COLL VENOUS BLD VENIPUNCTURE: CPT

## 2020-01-20 PROCEDURE — 80048 BASIC METABOLIC PNL TOTAL CA: CPT

## 2020-01-20 PROCEDURE — 82962 GLUCOSE BLOOD TEST: CPT

## 2020-01-20 PROCEDURE — 74011250637 HC RX REV CODE- 250/637: Performed by: INTERNAL MEDICINE

## 2020-01-20 RX ORDER — POLYETHYLENE GLYCOL 3350 17 G/17G
17 POWDER, FOR SOLUTION ORAL
Qty: 235 G | Refills: 0 | Status: SHIPPED
Start: 2020-01-20 | End: 2020-02-19

## 2020-01-20 RX ORDER — ONDANSETRON 8 MG/1
8 TABLET, ORALLY DISINTEGRATING ORAL
Qty: 20 TAB | Refills: 0 | Status: SHIPPED
Start: 2020-01-20 | End: 2022-02-15

## 2020-01-20 RX ORDER — POTASSIUM CHLORIDE 20 MEQ/1
20 TABLET, EXTENDED RELEASE ORAL
Status: COMPLETED | OUTPATIENT
Start: 2020-01-20 | End: 2020-01-20

## 2020-01-20 RX ORDER — AMLODIPINE BESYLATE 10 MG/1
10 TABLET ORAL DAILY
Qty: 30 TAB | Refills: 0 | Status: SHIPPED
Start: 2020-01-20 | End: 2022-02-15

## 2020-01-20 RX ORDER — FAMOTIDINE 20 MG/1
20 TABLET, FILM COATED ORAL DAILY
Qty: 30 TAB | Refills: 0 | Status: SHIPPED
Start: 2020-01-20 | End: 2020-02-19

## 2020-01-20 RX ADMIN — ONDANSETRON 8 MG: 4 TABLET, ORALLY DISINTEGRATING ORAL at 10:36

## 2020-01-20 RX ADMIN — CLOPIDOGREL BISULFATE 75 MG: 75 TABLET, FILM COATED ORAL at 09:38

## 2020-01-20 RX ADMIN — AMLODIPINE BESYLATE 10 MG: 10 TABLET ORAL at 09:38

## 2020-01-20 RX ADMIN — FAMOTIDINE 20 MG: 20 TABLET, FILM COATED ORAL at 09:38

## 2020-01-20 RX ADMIN — POTASSIUM CHLORIDE 20 MEQ: 20 TABLET, EXTENDED RELEASE ORAL at 09:38

## 2020-01-20 RX ADMIN — Medication 10 ML: at 06:38

## 2020-01-20 NOTE — PROGRESS NOTES
Pt to discharge to [facility]. Pt's room number is [address] B and report line is [phone] [address] 2. CM arranged [address] to pick pt up at 2:30pm. CM notified Floor Nurse of this information. CM was informed by [facility] liaison, pr's daughter has completed paperwork. CM will inform the pt, pt will not be able to transport electric wheelchair with [address]. CM will notify the pt and pt's daughter. No needs voiced at this time. CM remains available if any needs shall arise. Care Management Interventions  PCP Verified by CM: Yes  Mode of Transport at Discharge: Other (see comment)  Transition of Care Consult (CM Consult): Discharge Planning  Discharge Durable Medical Equipment: No(Pt confirmed DME in the home, such as a wheelchair. Pt staes she thinks she may have a bedside commode, but was unsure. )  Physical Therapy Consult: No  Occupational Therapy Consult: No  Speech Therapy Consult: No  Current Support Network: Relative's Home(Pt lives with her daughter. )  Confirm Follow Up Transport: Family(Pt's daughter provides assistance with transportation. )  The Plan for Transition of Care is Related to the Following Treatment Goals : Pt to obtain care to become medically stable for discharge and to discharge to [facility]. The Patient and/or Patient Representative was Provided with a Choice of Provider and Agrees with the Discharge Plan?: Yes  Name of the Patient Representative Who was Provided with a Choice of Provider and Agrees with the Discharge Plan: Patient Provided with a Choice of Provider and Agrees with the Discharge Plan.   Freedom of Choice List was Provided with Basic Dialogue that Supports the Patient's Individualized Plan of Care/Goals, Treatment Preferences and Shares the Quality Data Associated with the Providers?: Yes  [employer] Information Provided?: No  Discharge Location  Discharge Placement: Skilled nursing facility([facility])

## 2020-01-20 NOTE — PROGRESS NOTES
TRANSFER - OUT REPORT:    Verbal report given to Cornelious Felty, matt Robert  being transferred to [facility] for routine progression of care       Report consisted of patients Situation, Background, Assessment and   Recommendations(SBAR). Information from the following report(s) SBAR was reviewed with the receiving nurse. Lines:       Opportunity for questions and clarification was provided.       Patient transported with:   [employer]

## 2020-01-20 NOTE — DISCHARGE SUMMARY
Discharge Summary     Patient: Alfredo Padilla MRN: [id number]  SSN: [id number]    YOB: 1945  Age: 76 y.o.   Sex: female       Admit Date: 1/8/2020    Discharge Date: 1/20/2020      Admission Diagnoses: DEANNA (acute kidney injury) ([facility] [N17.9]    Discharge Diagnoses:   Problem List as of 1/20/2020 Date Reviewed: 7/30/2019          Codes Class Noted - Resolved    CAD (coronary artery disease) ICD-10-CM: I25.10  ICD-9-CM: 414.00  2/8/2018 - Present        DNR (do not resuscitate) ICD-10-CM: Z66  ICD-9-CM: V49.86  1/14/2020 - Present        Non-traumatic rhabdomyolysis ICD-10-CM: M62.82  ICD-9-CM: 728.88  1/9/2020 - Present        * (Principal) DEANNA (acute kidney injury) ([facility] ICD-10-CM: N17.9  ICD-9-CM: 584.9  1/8/2020 - Present        Elevated liver enzymes ICD-10-CM: R74.8  ICD-9-CM: 790.5  1/8/2020 - Present        Neuropathy (Chronic) ICD-10-CM: G62.9  ICD-9-CM: 355.9  4/6/2017 - Present        Palpitations ICD-10-CM: R00.2  ICD-9-CM: 785.1  12/9/2015 - Present        Abnormal EKG ICD-10-CM: R94.31  ICD-9-CM: 794.31  12/9/2015 - Present        Anemia ICD-10-CM: D64.9  ICD-9-CM: 285.9  1/10/2015 - Present        Anemia associated with acute blood loss ICD-10-CM: D62  ICD-9-CM: 285.1  1/10/2015 - Present        Other specified arthropathy, shoulder region ICD-10-CM: M12.819  ICD-9-CM: 716.81  1/9/2015 - Present        Nontraumatic rupture of tendons of biceps (long head) ICD-10-CM: Q87.093  ICD-9-CM: 727.62  1/9/2015 - Present        DM2 (diabetes mellitus, type 2) ([facility] ICD-10-CM: E11.9  ICD-9-CM: 250.00  1/9/2015 - Present        Coronary atherosclerosis of native coronary artery (Chronic) ICD-10-CM: I25.10  ICD-9-CM: 414.01  7/23/2009 - Present    Overview Addendum 2/8/2018  1:13 PM by Tasha Hamilton MD     7/2009 - PCI of the RCA 3.5x33 and 3.5x18 Cypher  7/2009 - PCI of the LAD 2.5x18 Cypher, LCX 2.75x13 Cypher with kissing POBA of small  OM1  06/2013:  Stress MPI with no ischemia and small inferior apical fixed defect in patient with prior IMI  06/2013:  Normal echo  04/2014:  Stable CAD and FFR LAD 0.88  01/2018:  PCI mLAD for ISR - 2.5x28 Synergy                 Hypertension (Chronic) ICD-10-CM: I10  ICD-9-CM: 401.9  6/30/2009 - Present        Hyperlipidemia (Chronic) ICD-10-CM: E78.5  ICD-9-CM: 272.4  6/30/2009 - Present        DJD (degenerative joint disease) (Chronic) ICD-10-CM: M19.90  ICD-9-CM: 715.90  6/30/2009 - Present        GERD (gastroesophageal reflux disease) (Chronic) ICD-10-CM: K21.9  ICD-9-CM: 530.81  6/30/2009 - Present        RESOLVED: Hypomagnesemia ICD-10-CM: E83.42  ICD-9-CM: 275.2  1/10/2015 - 1/12/2015        RESOLVED: Urinary tract infection ICD-10-CM: N39.0  ICD-9-CM: 599.0  1/9/2015 - 1/12/2015        RESOLVED: F/u of acute inferior myocardial infarction (Chronic) ICD-9-CM: 410.40  7/23/2009 - 6/4/2018        RESOLVED: S/P PTCA (percutaneous transluminal coronary angioplasty) (Chronic) ICD-10-CM: Z98.61  ICD-9-CM: V45.82  7/23/2009 - 3/21/2019    Overview Signed 6/23/2016  2:30 PM by Neda Orellana     7/2009 - PCI of the RCA 3.5x33 and 3.5x18 Cypher  7/2009 - PCI of the LAD 2.5x18 Cypher, LCX 2.75x13 Cypher with kissing POBA of small  OM1               RESOLVED: Acute MI inferior first episode care (Chronic) ICD-9-CM: 410.41  6/30/2009 - 6/4/2018    Overview Signed 6/23/2016  2:29 PM by Neda BOURGEOIS 7/2009                    Discharge Condition: [facility] Course:   Ms. Bereket Ray is a 75 yo female who was admitted for progressive weakness for the past 2 or 3 weeks.  She went to see her primary doctor.  Part of the work-up she was found to have markedly elevated creatinine and liver enzymes she was advised to come to emergency room for evaluation. In the emergency room she was found to have significant elevation of creatinine, and liver enzymes.  She also had non traumatic rhabdomyolysis. Past medical history significant for diabetes mellitus, CAD post stent placement on DAPT,  hypertension, , hyperlipidemia. Possible etiology of her DEANNA, rhabdo and elevated LFTs related to medication use ( crestor, cymbalta, lyrica, lisinopril ) and poor oral water intake at home. Those home medications were discontinued. She received IVF hydration, strict IO and nephrology evaluation. Liver work up was negative  For hepatitis or anatomic lesions. GI consulted, no acute intervention done. She had anemia, possible CORTEZ, that required 1 PRBC. She had no over GI bleeding signs. PT/OT consulted and STR recommended. Her clinical condition improved and her Creatinine was much better after medical management. CK levels decreased. The patient is having a good urine output. Nutritionist consulted and she is currently on Glucerna. CM on board and the patient will be going to STR today.      Physical Exam:      General:                    feels weak    Head:                         Normocephalic/atraumatic. Eyes:                                   palpebral pallor, no scleral icterus. ENT:                                    External auricular and nasal exam within normal limits.                                             BSHVCT membranes are moist.  Neck:                                   Supple, non-tender, no JVD. Lungs:                       Clear to auscultation bilaterally without wheezes or crackles.                                             No respiratory distress or accessory muscle use. Heart:                                  Regular rate and rhythm, without murmurs, rubs, or gallops. Abdomen:                  Soft, non-tender, non-distended with normoactive bowel sounds. Genitourinary:           No tenderness over the bladder or bilateral CVAs. Extremities:               Without clubbing, cyanosis, or edema. Skin:                                    Normal color, texture, and turgor. No rashes, lesions, or jaundice.   Pulses:                      Radial and dorsalis pedis pulses present 2+ bilaterally.                                               Capillary refill <2s. Neurologic:                CN II-XII grossly intact and symmetrical.                                               Moving all four extremities well with no focal deficits. Psychiatric:                Pleasant demeanor, appropriate affect. Alert and oriented x 3     Consults: Nephrology    Significant Diagnostic Studies: see note     Disposition: str     Discharge Medications:   Current Discharge Medication List      START taking these medications    Details   amLODIPine (NORVASC) 10 mg tablet Take 1 Tab by mouth daily. Qty: 30 Tab, Refills: 0      famotidine (PEPCID) 20 mg tablet Take 1 Tab by mouth daily for 30 days. Qty: 30 Tab, Refills: 0      ondansetron (ZOFRAN ODT) 8 mg disintegrating tablet Take 1 Tab by mouth every six (6) hours as needed for Nausea or Vomiting. Qty: 20 Tab, Refills: 0         CONTINUE these medications which have CHANGED    Details   polyethylene glycol (MIRALAX) 17 gram/dose powder Take 17 g by mouth daily as needed for Other (constipation) for up to 30 days. Qty: 235 g, Refills: 0         CONTINUE these medications which have NOT CHANGED    Details   clopidogrel (PLAVIX) 75 mg tab Take 1 Tab by mouth daily. Qty: 90 Tab, Refills: 3      nitroglycerin (NITROSTAT) 0.4 mg SL tablet 1 Tab by SubLINGual route as needed for Chest Pain for up to 3 doses. Qty: 25 Tab, Refills: 11      aspirin delayed-release 81 mg tablet Take 81 mg by mouth nightly. multivitamin with iron (DAILY MULTI-VITAMINS/IRON) tablet Take 1 Tab by mouth daily. VITAMIN B-12 IJ by Injection route every thirty (30) days.           STOP taking these medications       rosuvastatin (CRESTOR) 40 mg tablet Comments:   Reason for Stopping:         lisinopril (PRINIVIL, ZESTRIL) 10 mg tablet Comments:   Reason for Stopping:         METFORMIN HCL (METFORMIN PO) Comments:   Reason for Stopping: DULOXETINE HCL (CYMBALTA PO) Comments:   Reason for Stopping:         NEXIUM 40 mg capsule Comments:   Reason for Stopping:         LYRICA PO Comments:   Reason for Stopping:        Physical Exam:   General: feels weak   Head: Normocephalic/atraumatic. Eyes: palpebral pallor, no scleral icterus. ENT: External auricular and nasal exam within normal limits. Mucous membranes are moist.   Neck: Supple, non-tender, no JVD. Lungs: Clear to auscultation bilaterally without wheezes or crackles. No respiratory distress or accessory muscle use. Heart: Regular rate and rhythm, without murmurs, rubs, or gallops. Abdomen: Soft, non-tender, non-distended with normoactive bowel sounds. Genitourinary: No tenderness over the bladder or bilateral CVAs. Extremities: Without clubbing, cyanosis, or edema. Skin: Normal color, texture, and turgor. No rashes, lesions, or jaundice. Pulses: Radial and dorsalis pedis pulses present 2+ bilaterally. Capillary refill <2s. Neurologic: CN II-XII grossly intact and symmetrical.   Moving all four extremities well with no focal deficits. Psychiatric: Pleasant demeanor, appropriate affect. Alert and oriented x 3          Activity: Activity as tolerated  Diet: Renal Diet =- glucerna shake with meals   Wound Care: None needed    Follow-up Appointments   Procedures    FOLLOW UP VISIT Appointment in: One Week pcp     pcp     Standing Status:   Standing     Number of Occurrences:   1     Order Specific Question:   Appointment in     Answer: One Week    FOLLOW UP VISIT Appointment in: Two Weeks Nephrology     Nephrology     Standing Status:   Standing     Number of Occurrences:   1     Standing Expiration Date:   1/21/2020     Order Specific Question:   Appointment in     Answer:    Two Weeks       Signed By: Judge Christelle MD     January 20, 2020

## 2020-01-20 NOTE — PROGRESS NOTES
Renal Progress Note    Admission Date: 1/8/2020   Subjective:      Comfortable     Objective:     Physical Exam:    Patient Vitals for the past 8 hrs:   BP Temp Pulse Resp SpO2   01/20/20 0649 150/89 98.1 °F (36.7 °C) 79 18 95 %   01/20/20 0340 148/75 98 °F (36.7 °C) 73 18 94 %      Gen: comfortable , NAD  HEENT: moist membranes  CV: S1, S2  Lungs: Clear bilaterally    Current Facility-Administered Medications   Medication Dose Route Frequency    amLODIPine (NORVASC) tablet 10 mg  10 mg Oral DAILY    hydrALAZINE (APRESOLINE) 20 mg/mL injection 10 mg  10 mg IntraVENous Q6H PRN    dextrose (D50W) injection syrg 25 g  25 g IntraVENous PRN    carboxymethylcellulose sodium (REFRESH LIQUIGEL) 1 % ophthalmic solution 1 Drop  1 Drop Both Eyes PRN    ondansetron (ZOFRAN ODT) tablet 8 mg  8 mg Oral Q6H PRN    0.9% sodium chloride infusion 250 mL  250 mL IntraVENous PRN    acetaminophen (TYLENOL) tablet 500 mg  500 mg Oral Q6H PRN    hydrOXYzine pamoate (VISTARIL) capsule 25 mg  25 mg Oral TID PRN    famotidine (PEPCID) tablet 20 mg  20 mg Oral DAILY    clopidogrel (PLAVIX) tablet 75 mg  75 mg Oral DAILY    aspirin delayed-release tablet 81 mg  81 mg Oral QHS    nitroglycerin (NITROSTAT) tablet 0.4 mg  0.4 mg SubLINGual PRN    polyethylene glycol (MIRALAX) packet 17 g  17 g Oral DAILY PRN    sodium chloride (NS) flush 5-40 mL  5-40 mL IntraVENous PRN    sodium chloride (NS) flush 5-40 mL  5-40 mL IntraVENous Q8H    sodium chloride (NS) flush 5-40 mL  5-40 mL IntraVENous PRN    ondansetron (ZOFRAN) injection 4 mg  4 mg IntraVENous Q6H PRN    bisacodyl (DULCOLAX) tablet 5 mg  5 mg Oral DAILY PRN    insulin lispro (HUMALOG) injection   SubCUTAneous AC&HS            Data Review:     LABS:   Recent Results (from the past 12 hour(s))   METABOLIC PANEL, BASIC    Collection Time: 01/20/20  6:27 AM   Result Value Ref Range    Sodium 146 (H) 136 - 145 mmol/L    Potassium 3.4 (L) 3.5 - 5.1 mmol/L    Chloride 114 (H) 98 - 107 mmol/L    CO2 20 (L) 21 - 32 mmol/L    Anion gap 12 7 - 16 mmol/L    Glucose 96 65 - 100 mg/dL    BUN 54 (H) 8 - 23 MG/DL    Creatinine 6.13 (H) 0.6 - 1.0 MG/DL    GFR est AA 9 (L) >60 ml/min/1.73m2    GFR est non-AA 7 (L) >60 ml/min/1.73m2    Calcium 9.1 8.3 - 10.4 MG/DL   GLUCOSE, POC    Collection Time: 01/20/20  6:52 AM   Result Value Ref Range    Glucose (POC) 105 (H) 65 - 100 mg/dL         Plan:     Principal Problem:    DEANNA (acute kidney injury) ([facility] (1/8/2020)    Active Problems:    CAD (coronary artery disease) (2/8/2018)      Hypertension (6/30/2009)      Hyperlipidemia (6/30/2009)      GERD (gastroesophageal reflux disease) (6/30/2009)      Coronary atherosclerosis of native coronary artery (7/23/2009)      Overview: 7/2009 - PCI of the RCA 3.5x33 and 3.5x18 Cypher      7/2009 - PCI of the LAD 2.5x18 Cypher, LCX 2.75x13 Cypher with kissing       POBA of small  OM1      06/2013:  Stress MPI with no ischemia and small inferior apical fixed       defect in patient with prior IMI      06/2013:  Normal echo      04/2014:  Stable CAD and FFR LAD 0.88      01/2018:  PCI mLAD for ISR - 2.5x28 Synergy                  DM2 (diabetes mellitus, type 2) ([facility] (1/9/2015)      Anemia (1/10/2015)      Elevated liver enzymes (1/8/2020)      Non-traumatic rhabdomyolysis (1/9/2020)      DNR (do not resuscitate) (1/14/2020)       DEANNA  - rhabdomyolysis, Creatinine slowly trending down, non oliguric.   -P/C/ratio 2.7, renal imaging with evidence of CKD, no hydronephrosis, 0.4 right renal calculus. CK elevated >14,000->10,291->2,023->1,045, SPEP no M spike,   - continues to slowly improve. Planning discharge to STR. No barrier from nephrology perspective. Will follow up outpt.   She has normal baseline so hopeful for full recovery

## 2020-01-20 NOTE — PROGRESS NOTES
Hourly rounds complete this shift, no new complaints at this time, patient c/o: bed in low, locked position, call light and bedside table within reach,  all needs met. Will continue to monitor Report to day shift nurse.

## 2020-02-05 ENCOUNTER — HOME HEALTH ADMISSION (OUTPATIENT)
Dept: HOME HEALTH SERVICES | Facility: HOME HEALTH | Age: 75
End: 2020-02-05
Payer: MEDICARE

## 2020-02-11 ENCOUNTER — HOME CARE VISIT (OUTPATIENT)
Dept: SCHEDULING | Facility: HOME HEALTH | Age: 75
End: 2020-02-11
Payer: MEDICARE

## 2020-02-11 VITALS
TEMPERATURE: 98.2 F | RESPIRATION RATE: 18 BRPM | HEART RATE: 74 BPM | DIASTOLIC BLOOD PRESSURE: 70 MMHG | SYSTOLIC BLOOD PRESSURE: 122 MMHG | OXYGEN SATURATION: 98 %

## 2020-02-11 PROCEDURE — 400013 HH SOC

## 2020-02-11 PROCEDURE — G0299 HHS/HOSPICE OF RN EA 15 MIN: HCPCS

## 2020-02-12 ENCOUNTER — HOME CARE VISIT (OUTPATIENT)
Dept: SCHEDULING | Facility: HOME HEALTH | Age: 75
End: 2020-02-12
Payer: MEDICARE

## 2020-02-12 VITALS
DIASTOLIC BLOOD PRESSURE: 68 MMHG | RESPIRATION RATE: 16 BRPM | TEMPERATURE: 97.8 F | HEART RATE: 62 BPM | SYSTOLIC BLOOD PRESSURE: 122 MMHG

## 2020-02-12 PROCEDURE — G0152 HHCP-SERV OF OT,EA 15 MIN: HCPCS

## 2020-02-13 ENCOUNTER — HOME CARE VISIT (OUTPATIENT)
Dept: SCHEDULING | Facility: HOME HEALTH | Age: 75
End: 2020-02-13
Payer: MEDICARE

## 2020-02-13 VITALS
TEMPERATURE: 97.4 F | HEART RATE: 78 BPM | SYSTOLIC BLOOD PRESSURE: 126 MMHG | OXYGEN SATURATION: 96 % | RESPIRATION RATE: 18 BRPM | DIASTOLIC BLOOD PRESSURE: 66 MMHG

## 2020-02-13 VITALS
HEART RATE: 74 BPM | SYSTOLIC BLOOD PRESSURE: 124 MMHG | DIASTOLIC BLOOD PRESSURE: 68 MMHG | TEMPERATURE: 97.3 F | RESPIRATION RATE: 18 BRPM | OXYGEN SATURATION: 98 %

## 2020-02-13 PROCEDURE — G0153 HHCP-SVS OF S/L PATH,EA 15MN: HCPCS

## 2020-02-13 PROCEDURE — G0299 HHS/HOSPICE OF RN EA 15 MIN: HCPCS

## 2020-02-14 ENCOUNTER — HOME CARE VISIT (OUTPATIENT)
Dept: SCHEDULING | Facility: HOME HEALTH | Age: 75
End: 2020-02-14
Payer: MEDICARE

## 2020-02-14 PROCEDURE — G0151 HHCP-SERV OF PT,EA 15 MIN: HCPCS

## 2020-02-15 VITALS
SYSTOLIC BLOOD PRESSURE: 122 MMHG | RESPIRATION RATE: 18 BRPM | HEART RATE: 85 BPM | TEMPERATURE: 97.7 F | DIASTOLIC BLOOD PRESSURE: 78 MMHG

## 2020-02-20 ENCOUNTER — HOME CARE VISIT (OUTPATIENT)
Dept: SCHEDULING | Facility: HOME HEALTH | Age: 75
End: 2020-02-20
Payer: MEDICARE

## 2020-02-20 VITALS
HEART RATE: 74 BPM | RESPIRATION RATE: 18 BRPM | SYSTOLIC BLOOD PRESSURE: 110 MMHG | TEMPERATURE: 98.3 F | DIASTOLIC BLOOD PRESSURE: 70 MMHG | OXYGEN SATURATION: 94 %

## 2020-02-20 VITALS
DIASTOLIC BLOOD PRESSURE: 72 MMHG | OXYGEN SATURATION: 97 % | HEART RATE: 82 BPM | TEMPERATURE: 97.3 F | RESPIRATION RATE: 18 BRPM | SYSTOLIC BLOOD PRESSURE: 126 MMHG

## 2020-02-20 PROCEDURE — G0153 HHCP-SVS OF S/L PATH,EA 15MN: HCPCS

## 2020-02-20 PROCEDURE — G0299 HHS/HOSPICE OF RN EA 15 MIN: HCPCS

## 2022-02-15 PROBLEM — N18.4 CHRONIC KIDNEY DISEASE (CKD), STAGE IV (SEVERE) (HCC): Status: ACTIVE | Noted: 2022-02-15

## 2022-02-15 PROBLEM — R06.09 DOE (DYSPNEA ON EXERTION): Status: ACTIVE | Noted: 2022-02-15

## 2022-02-15 PROBLEM — D50.9 IRON DEFICIENCY ANEMIA: Status: ACTIVE | Noted: 2022-02-15

## 2022-03-18 PROBLEM — R06.09 DOE (DYSPNEA ON EXERTION): Status: ACTIVE | Noted: 2022-02-15

## 2022-03-18 PROBLEM — R74.8 ELEVATED LIVER ENZYMES: Status: ACTIVE | Noted: 2020-01-08

## 2022-03-18 PROBLEM — N18.4 CHRONIC KIDNEY DISEASE (CKD), STAGE IV (SEVERE) (HCC): Status: ACTIVE | Noted: 2022-02-15

## 2022-03-19 PROBLEM — G62.9 NEUROPATHY: Status: ACTIVE | Noted: 2017-04-06

## 2022-03-19 PROBLEM — Z66 DNR (DO NOT RESUSCITATE): Status: ACTIVE | Noted: 2020-01-14

## 2022-03-19 PROBLEM — D50.9 IRON DEFICIENCY ANEMIA: Status: ACTIVE | Noted: 2022-02-15

## 2022-03-19 PROBLEM — I25.10 CAD IN NATIVE ARTERY: Status: ACTIVE | Noted: 2018-02-08

## 2022-03-19 PROBLEM — M62.82 NON-TRAUMATIC RHABDOMYOLYSIS: Status: ACTIVE | Noted: 2020-01-09

## 2022-03-20 PROBLEM — N17.9 AKI (ACUTE KIDNEY INJURY) (HCC): Status: ACTIVE | Noted: 2020-01-08

## 2022-08-15 NOTE — PROGRESS NOTES
[facility] CARDIOLOGY Follow Up                 Reason for Visit: Stable ischemic heart disease    Subjective:     Patient is a 68 y.o. female with a PMH of CAD status post PCI, hypertension, hyperlipidemia, SILVANO, CKD stage IV, and diabetes who presents for follow-up. The patient was last seen in February 2022. Plavix was discontinued since the patient had no indication for further therapy in order to mitigate bleeding risk. She had an echocardiogram in March 2022 that was noted to demonstrate a normal EF and mild AI with mild MR. The patient denies angina and dyspnea.     Past Medical History:   Diagnosis Date    Abnormal EKG 12/9/2015    CAD (coronary artery disease) 2009    MI & 4 stents    Chronic pain     left shoulder    Coagulation disorder (HCC)     anemia: pt reports after a surgery    Diabetes ([facility] 2013    type 2; oral med    DJD (degenerative joint disease) 6/30/2009    DM2 (diabetes mellitus, type 2) ([facility] 1/9/2015    Gastrointestinal disorder     GERD (gastroesophageal reflux disease)     controlled with med    Hypertension     well controlled with med    Nausea & vomiting     requests scopaolamine patch    Neuropathy     Other ill-defined conditions(799.89)     hyperlipidemia/dyslipidemia    Palpitations 12/9/2015    Unspecified adverse effect of anesthesia     pt reports she does not want nerve block with TSA; reports pain at injection site after previous block    Urinary tract infection 1/9/2015      Past Surgical History:   Procedure Laterality Date    APPENDECTOMY      hernia    GYN      hysterectomy    KNEE ARTHROSCOPY      total of 18 knee surgeries    ORTHOPEDIC SURGERY      reconstructive L knee    IN CARDIAC SURG PROCEDURE UNLIST      stents x 4    TOTAL SHOULDER ARTHROPLASTY      right; rotator cuff x 2      Family History   Problem Relation Age of Onset    Coronary Art Dis Other         PREMATURE     Heart Disease Father     Arrhythmia Mother     Heart Attack Father       Social History Tobacco Use    Smoking status: Never    Smokeless tobacco: Never   Substance Use Topics    Alcohol use: No      Allergies   Allergen Reactions    Iothalamate Meglumine Anaphylaxis     Allergic to conray ivp dye    Adhesive Tape Other (See Comments)     Blisters - tolerates newer tapes without problems    Codeine Itching    Hydromorphone Other (See Comments)     hallucinate    Meperidine Swelling    Rosuvastatin Other (See Comments)     ELEVATED LFTs, elevated CK level         ROS:  No obvious pertinent positives on review of systems except for what was outlined above.        Objective:       /74   Pulse 70   Ht 5' 6\" (1.676 m)   Wt 150 lb (68 kg)   BMI 24.21 kg/m²     BP Readings from Last 3 Encounters:   08/16/22 118/74   02/15/22 128/60       Wt Readings from Last 3 Encounters:   08/16/22 150 lb (68 kg)   02/15/22 154 lb 9.6 oz (70.1 kg)       General/Constitutional:   Alert and oriented x 3, no acute distress  HEENT:   normocephalic, atraumatic, moist mucous membranes  Neck:   No JVD or carotid bruits bilaterally  Cardiovascular:   regular rate and rhythm, no rub/gallop appreciated  Pulmonary:   clear to auscultation bilaterally, no respiratory distress  Abdomen:   soft, non-tender, non-distended  Ext:   No sig LE edema bilaterally  Skin:  warm and dry, no obvious rashes seen  Neuro:   no obvious sensory or motor deficits  Psychiatric:   normal mood and affect    Data Review:   No results found for: CHOL  No results found for: TRIG  No results found for: HDL  No results found for: LDLCHOLESTEROL, LDLCALC  No results found for: LABVLDL, VLDL  No results found for: [facility]     Lab Results   Component Value Date/Time     01/20/2020 06:27 AM     01/19/2020 06:36 AM     01/18/2020 04:01 AM    K 3.4 01/20/2020 06:27 AM    K 3.9 01/19/2020 06:36 AM    K 3.4 01/18/2020 04:01 AM     01/20/2020 06:27 AM     01/19/2020 06:36 AM     01/18/2020 04:01 AM    CO2 20 01/20/2020 06:27 AM    CO2 23 01/19/2020 06:36 AM    CO2 19 01/18/2020 04:01 AM    BUN 54 01/20/2020 06:27 AM    BUN 50 01/19/2020 06:36 AM    BUN 55 01/18/2020 04:01 AM    CREATININE 6.13 01/20/2020 06:27 AM    CREATININE 6.27 01/19/2020 06:36 AM    CREATININE 6.59 01/18/2020 04:01 AM    GLUCOSE 96 01/20/2020 06:27 AM    GLUCOSE 96 01/19/2020 06:36 AM    GLUCOSE 70 01/18/2020 04:01 AM    CALCIUM 9.1 01/20/2020 06:27 AM    CALCIUM 9.0 01/19/2020 06:36 AM    CALCIUM 8.9 01/18/2020 04:01 AM         Lab Results   Component Value Date     (H) 01/14/2020     (H) 01/13/2020     (H) 01/11/2020    AST 68 (H) 01/14/2020     (H) 01/13/2020     (H) 01/11/2020        Assessment/Plan:   1. CAD in native artery  - Continue with baby aspirin daily  - Discontinue Zetia due to LFT derangement and   - Start Repatha    2. Hyperlipidemia, unspecified hyperlipidemia type  - Discontinue Zetia due to LFT derangement and   - Start Repatha    3. Chronic kidney disease (CKD), stage IV (severe) ([facility]  - Nephrology note reviewed  - Continue to follow with nephrology    4.  Mild mitral and aortic regurgitation  - Surveillance echocardiogram in 3 to 5 years    F/U: 6 months    Mery Navarro MD impaired balance/decreased strength

## 2022-08-16 ENCOUNTER — OFFICE VISIT (OUTPATIENT)
Dept: CARDIOLOGY CLINIC | Age: 77
End: 2022-08-16
Payer: MEDICARE

## 2022-08-16 VITALS
HEIGHT: 66 IN | HEART RATE: 70 BPM | SYSTOLIC BLOOD PRESSURE: 118 MMHG | BODY MASS INDEX: 24.11 KG/M2 | DIASTOLIC BLOOD PRESSURE: 74 MMHG | WEIGHT: 150 LBS

## 2022-08-16 DIAGNOSIS — I08.0 MILD MITRAL AND AORTIC REGURGITATION: ICD-10-CM

## 2022-08-16 DIAGNOSIS — E78.5 HYPERLIPIDEMIA, UNSPECIFIED HYPERLIPIDEMIA TYPE: ICD-10-CM

## 2022-08-16 DIAGNOSIS — N18.4 CHRONIC KIDNEY DISEASE (CKD), STAGE IV (SEVERE) (HCC): ICD-10-CM

## 2022-08-16 DIAGNOSIS — I25.10 CAD IN NATIVE ARTERY: Primary | ICD-10-CM

## 2022-08-16 PROCEDURE — 1123F ACP DISCUSS/DSCN MKR DOCD: CPT | Performed by: INTERNAL MEDICINE

## 2022-08-16 PROCEDURE — 99214 OFFICE O/P EST MOD 30 MIN: CPT | Performed by: INTERNAL MEDICINE

## 2022-08-16 RX ORDER — CYANOCOBALAMIN 1000 UG/ML
1000 INJECTION INTRAMUSCULAR; SUBCUTANEOUS
COMMUNITY
Start: 2022-07-13

## 2023-07-10 ENCOUNTER — TELEPHONE (OUTPATIENT)
Age: 78
End: 2023-07-10

## 2023-07-10 NOTE — PROGRESS NOTES
[facility] CARDIOLOGY Follow Up                 Reason for Visit: Chest pain    Subjective:     Patient is a 68 y.o. female with a PMH of CAD status post PCI, hypertension, hyperlipidemia, SILVANO, CKD stage IV, rhabdomyolysis and diabetes who presents for follow-up. The patient was last seen in August 2022. Zetia was discontinued due to LFT derangement. Yash Canavan was initiated; however, the patient is not taking the medication. The patient had a TTE in March 2022 that was noted to demonstrate a normal EF, mild AI, and mild MR. She is accompanied by her caretaker. Her caretaker notes that the patient had an episode of chest pain on Friday while sitting in her motorized chair. The pain lasted about 30 seconds. The patient reports taking 1 sublingual nitroglycerin; however, the caretaker notes that she may not have taken nitroglycerin. Her caretaker notes that the patient gets ORTIZ \"getting out of the shower\". The patient has chronic dyspnea on exertion.     Past Medical History:   Diagnosis Date    Abnormal EKG 12/9/2015    CAD (coronary artery disease) 2009    MI & 4 stents    Chronic pain     left shoulder    Coagulation disorder (HCC)     anemia: pt reports after a surgery    Diabetes ([address]) 2013    type 2; oral med    DJD (degenerative joint disease) 6/30/2009    DM2 (diabetes mellitus, type 2) ([address]) 1/9/2015    Gastrointestinal disorder     GERD (gastroesophageal reflux disease)     controlled with med    Hypertension     well controlled with med    Nausea & vomiting     requests scopaolamine patch    Neuropathy     Other ill-defined conditions(169.89)     hyperlipidemia/dyslipidemia    Palpitations 12/9/2015    Unspecified adverse effect of anesthesia     pt reports she does not want nerve block with TSA; reports pain at injection site after previous block    Urinary tract infection 1/9/2015      Past Surgical History:   Procedure Laterality Date    APPENDECTOMY      hernia    GYN      hysterectomy    KNEE

## 2023-07-10 NOTE — TELEPHONE ENCOUNTER
Patient is getting SOB and on Friday had CP with pain down her left arm. She is getting more tired to the point where she doesn't want to shower. The caregiver is concerned.    Melina Raygoza [phone]

## 2023-07-10 NOTE — TELEPHONE ENCOUNTER
Caregiver, Rosio Oliva, states patient, who has dementia and is not able to give details, complained of very bad chest pain x 30 minutes on 7/7/23. Increasing SOB, tiredness, and lethargy on exertion, especially when showering. No chest pain, today. Missed 2/16/23 appointment with Dr. Manisha Rhodes. Tiago Oj asks for appointment with Dr. Manisha Rhodes. Scheduled next available appointment with Dr. Manisha Rhodes on 7/11/23 at 10:00 am at [facility]. Advised Tammie to take patient to [facility] for immediate evaluation of any chest pain or SOB not relieved by NTG or if patient seems to be in distress. Tammie verbalized understanding.

## 2023-07-11 ENCOUNTER — OFFICE VISIT (OUTPATIENT)
Age: 78
End: 2023-07-11
Payer: MEDICARE

## 2023-07-11 ENCOUNTER — TELEPHONE (OUTPATIENT)
Age: 78
End: 2023-07-11

## 2023-07-11 VITALS
HEART RATE: 64 BPM | HEIGHT: 66 IN | WEIGHT: 147.6 LBS | DIASTOLIC BLOOD PRESSURE: 78 MMHG | SYSTOLIC BLOOD PRESSURE: 134 MMHG | BODY MASS INDEX: 23.72 KG/M2

## 2023-07-11 DIAGNOSIS — Z87.898 HISTORY OF CHEST PAIN: ICD-10-CM

## 2023-07-11 DIAGNOSIS — N18.4 CHRONIC KIDNEY DISEASE (CKD), STAGE IV (SEVERE) (HCC): ICD-10-CM

## 2023-07-11 DIAGNOSIS — E78.5 HYPERLIPIDEMIA, UNSPECIFIED HYPERLIPIDEMIA TYPE: ICD-10-CM

## 2023-07-11 DIAGNOSIS — I25.10 CAD IN NATIVE ARTERY: ICD-10-CM

## 2023-07-11 DIAGNOSIS — E78.5 HYPERLIPIDEMIA, UNSPECIFIED HYPERLIPIDEMIA TYPE: Primary | ICD-10-CM

## 2023-07-11 DIAGNOSIS — I08.0 MILD MITRAL AND AORTIC REGURGITATION: ICD-10-CM

## 2023-07-11 DIAGNOSIS — R06.09 CHRONIC DYSPNEA: ICD-10-CM

## 2023-07-11 PROBLEM — I20.9 ANGINA PECTORIS, UNSPECIFIED (HCC): Status: ACTIVE | Noted: 2023-07-11

## 2023-07-11 PROBLEM — R07.89 ATYPICAL CHEST PAIN: Status: ACTIVE | Noted: 2023-07-11

## 2023-07-11 PROBLEM — I25.119 ATHEROSCLEROTIC HEART DISEASE OF NATIVE CORONARY ARTERY WITH UNSPECIFIED ANGINA PECTORIS (HCC): Status: ACTIVE | Noted: 2023-07-11

## 2023-07-11 LAB
CHOLEST SERPL-MCNC: 292 MG/DL
HDLC SERPL-MCNC: 59 MG/DL (ref 40–60)
HDLC SERPL: 4.9
LDLC SERPL CALC-MCNC: 200.8 MG/DL
TRIGL SERPL-MCNC: 161 MG/DL (ref 35–150)
VLDLC SERPL CALC-MCNC: 32.2 MG/DL (ref 6–23)

## 2023-07-11 PROCEDURE — 99214 OFFICE O/P EST MOD 30 MIN: CPT | Performed by: INTERNAL MEDICINE

## 2023-07-11 PROCEDURE — G8420 CALC BMI NORM PARAMETERS: HCPCS | Performed by: INTERNAL MEDICINE

## 2023-07-11 PROCEDURE — G8427 DOCREV CUR MEDS BY ELIG CLIN: HCPCS | Performed by: INTERNAL MEDICINE

## 2023-07-11 PROCEDURE — 3078F DIAST BP <80 MM HG: CPT | Performed by: INTERNAL MEDICINE

## 2023-07-11 PROCEDURE — 1090F PRES/ABSN URINE INCON ASSESS: CPT | Performed by: INTERNAL MEDICINE

## 2023-07-11 PROCEDURE — G8400 PT W/DXA NO RESULTS DOC: HCPCS | Performed by: INTERNAL MEDICINE

## 2023-07-11 PROCEDURE — 1123F ACP DISCUSS/DSCN MKR DOCD: CPT | Performed by: INTERNAL MEDICINE

## 2023-07-11 PROCEDURE — 1036F TOBACCO NON-USER: CPT | Performed by: INTERNAL MEDICINE

## 2023-07-11 PROCEDURE — 3075F SYST BP GE 130 - 139MM HG: CPT | Performed by: INTERNAL MEDICINE

## 2023-07-11 RX ORDER — THIAMINE HCL 100 MG
2500 TABLET ORAL EVERY OTHER DAY
COMMUNITY

## 2023-07-11 RX ORDER — NITROGLYCERIN 0.4 MG/1
0.4 TABLET SUBLINGUAL EVERY 5 MIN PRN
Qty: 25 TABLET | Refills: 3 | Status: SHIPPED | OUTPATIENT
Start: 2023-07-11

## 2023-07-11 NOTE — TELEPHONE ENCOUNTER
----- Message from Sharee Foote MD sent at 7/11/2023 10:33 AM EDT -----  Please facilitate coverage for Repatha for this patient.

## 2023-07-12 ENCOUNTER — TELEPHONE (OUTPATIENT)
Age: 78
End: 2023-07-12

## 2023-07-12 NOTE — TELEPHONE ENCOUNTER
----- Message from Adán Link MD sent at 7/11/2023  6:33 PM EDT -----  Please let the patient know that she has severe hyperlipidemia with an LDL of 201. Please facilitate Repatha. I reordered it today. Please see my note as to why she cannot take a statin.

## 2023-07-13 NOTE — TELEPHONE ENCOUNTER
Advised Andreea of lab results and Dr. Baldwin Brittle response. Advised Andreea that Caitlin Watts may require prior authorization. Advised Andreea to call back, if she does not receive call regarding Caitlin Watts within a few weeks. Felipe Leal verbalized understanding.

## 2023-07-27 ENCOUNTER — TELEPHONE (OUTPATIENT)
Age: 78
End: 2023-07-27

## 2023-07-27 NOTE — TELEPHONE ENCOUNTER
PA for Repatha APPROVED. Approval- 12/31/23 [id number]    I attempted to contact the pt's niece Samantha Edwards. I left a voicemail regarding the approval. I also tried contacting the pt but her voicemail is full.     I contacted [facility] left a message on the doctors line regarding the Approval.

## 2023-07-27 NOTE — TELEPHONE ENCOUNTER
Rec'd letter from [facility] that the prescription for Fidel Latin will not be covered.

## 2023-12-07 ENCOUNTER — CLINICAL DOCUMENTATION (OUTPATIENT)
Age: 78
End: 2023-12-07

## 2023-12-07 NOTE — PROGRESS NOTES
Repatha SureClick [id number] auto-injectors    Authorization starting on 01/01/2023 and ending on 12/31/2024.

## 2024-02-03 ENCOUNTER — CLINICAL DOCUMENTATION (OUTPATIENT)
Age: 79
End: 2024-02-03

## 2024-02-15 ENCOUNTER — TELEPHONE (OUTPATIENT)
Age: 79
End: 2024-02-15

## 2024-02-15 NOTE — TELEPHONE ENCOUNTER
Advised Jenaro, [facility] tech[facility] approved 1/1/23-12/31/24. Jenaro verbalized understanding, and states he will fill Repatha Rx and notify patient.

## 2024-08-29 NOTE — PROGRESS NOTES
[facility] CARDIOLOGY Follow Up                 Reason for Visit: CCD     Subjective:      Patient is a 78 y.o. female with a PMH of CAD status post PCI, hypertension, hyperlipidemia, SILVANO, CKD stage IV, rhabdomyolysis, Alzheimer's dementia and diabetes who presents for follow-up.  The patient was last seen in July 2023.  A lipid panel was ordered.  She was noted to have severe hyperlipidemia with an LDL of 201.  Repatha was reordered.  She had a TTE in March 2022 that was noted to demonstrate a normal EF, mild AI, and mild MR. The patient reports chronic dyspnea, and her caretaker notes that this occurs \"getting out of the shower\".  She denies angina.  She is accompanied by her caretaker.    Past Medical History:   Diagnosis Date    Abnormal EKG 12/9/2015    CAD (coronary artery disease) 2009    MI & 4 stents    Chronic pain     left shoulder    Coagulation disorder ([facility])     anemia: pt reports after a surgery    Diabetes ([facility]) 2013    type 2; oral med    DJD (degenerative joint disease) 6/30/2009    DM2 (diabetes mellitus, type 2) ([facility]) 1/9/2015    Gastrointestinal disorder     GERD (gastroesophageal reflux disease)     controlled with med    Hypertension     well controlled with med    Nausea & vomiting     requests scopaolamine patch    Neuropathy     Other ill-defined conditions(849.89)     hyperlipidemia/dyslipidemia    Palpitations 12/9/2015    Unspecified adverse effect of anesthesia     pt reports she does not want nerve block with TSA; reports pain at injection site after previous block    Urinary tract infection 1/9/2015      Past Surgical History:   Procedure Laterality Date    APPENDECTOMY      hernia    GYN      hysterectomy    KNEE ARTHROSCOPY      total of 18 knee surgeries    ORTHOPEDIC SURGERY      reconstructive L knee    [place] UNLISTED PROCEDURE CARDIAC SURGERY      stents x 4    SHOULDER ARTHROPLASTY      right; rotator cuff x 2      Family History   Problem Relation Age of Onset    Coronary Art Dis  Other         PREMATURE     Heart Disease Father     Arrhythmia Mother     Heart Attack Father       Social History     Tobacco Use    Smoking status: Never    Smokeless tobacco: Never   Substance Use Topics    Alcohol use: No      Allergies   Allergen Reactions    Iothalamate Meglumine Anaphylaxis     Allergic to conray ivp dye    Adhesive Tape Other (See Comments)     Blisters - tolerates newer tapes without problems    Codeine Itching    Hydromorphone Other (See Comments)     hallucinate    Meperidine Swelling    Rosuvastatin Other (See Comments)     ELEVATED LFTs, elevated CK level         ROS:  No obvious pertinent positives on review of systems except for what was outlined above.       Objective:       /80   Pulse 65   Ht 1.676 m (5' 5.98\")   Wt 71.2 kg (157 lb)   BMI 25.35 kg/m²     BP Readings from Last 3 Encounters:   08/30/24 128/80   07/11/23 134/78   08/16/22 118/74       Wt Readings from Last 3 Encounters:   08/30/24 71.2 kg (157 lb)   07/11/23 67 kg (147 lb 9.6 oz)   08/16/22 68 kg (150 lb)       General/Constitutional:   Alert and oriented x 3, no acute distress  HEENT:   normocephalic, atraumatic, moist mucous membranes  Neck:   No JVD or carotid bruits bilaterally  Cardiovascular:   regular rate and rhythm, no rub/gallop appreciated  Pulmonary:   clear to auscultation bilaterally, no respiratory distress  Abdomen:   soft, non-tender, non-distended  Ext:   No sig LE edema bilaterally  Skin:  warm and dry, no obvious rashes seen  Neuro:   no obvious sensory or motor deficits  Psychiatric:   normal mood and affect    ECG:   Sinus rhythm  Incomplete RBBB  LVH  Nonspecific ST and/or T wave abnormalities  Heart rate 70 bpm    Data Review:   Lab Results   Component Value Date    CHOL 292 (H) 07/11/2023     Lab Results   Component Value Date    TRIG 161 (H) 07/11/2023     Lab Results   Component Value Date    HDL 59 07/11/2023     No components found for: \"LDLCHOLESTEROL\", \"LDLCALC\"  Lab Results

## 2024-08-30 ENCOUNTER — OFFICE VISIT (OUTPATIENT)
Age: 79
End: 2024-08-30
Payer: COMMERCIAL

## 2024-08-30 VITALS
BODY MASS INDEX: 25.23 KG/M2 | HEART RATE: 70 BPM | SYSTOLIC BLOOD PRESSURE: 128 MMHG | WEIGHT: 157 LBS | DIASTOLIC BLOOD PRESSURE: 80 MMHG | HEIGHT: 66 IN

## 2024-08-30 DIAGNOSIS — E78.5 HYPERLIPIDEMIA, UNSPECIFIED HYPERLIPIDEMIA TYPE: ICD-10-CM

## 2024-08-30 DIAGNOSIS — N18.4 CHRONIC KIDNEY DISEASE (CKD), STAGE IV (SEVERE) (HCC): ICD-10-CM

## 2024-08-30 DIAGNOSIS — I08.0 MILD MITRAL AND AORTIC REGURGITATION: ICD-10-CM

## 2024-08-30 DIAGNOSIS — F02.80 ALZHEIMER'S DEMENTIA, UNSPECIFIED DEMENTIA SEVERITY, UNSPECIFIED TIMING OF DEMENTIA ONSET, UNSPECIFIED WHETHER BEHAVIORAL, PSYCHOTIC, OR MOOD DISTURBANCE OR ANXIETY (HCC): ICD-10-CM

## 2024-08-30 DIAGNOSIS — I25.10 CAD IN NATIVE ARTERY: Primary | ICD-10-CM

## 2024-08-30 DIAGNOSIS — G30.9 ALZHEIMER'S DEMENTIA, UNSPECIFIED DEMENTIA SEVERITY, UNSPECIFIED TIMING OF DEMENTIA ONSET, UNSPECIFIED WHETHER BEHAVIORAL, PSYCHOTIC, OR MOOD DISTURBANCE OR ANXIETY (HCC): ICD-10-CM

## 2024-08-30 DIAGNOSIS — R06.09 CHRONIC DYSPNEA: ICD-10-CM

## 2024-08-30 PROCEDURE — 3074F SYST BP LT 130 MM HG: CPT | Performed by: INTERNAL MEDICINE

## 2024-08-30 PROCEDURE — 93000 ELECTROCARDIOGRAM COMPLETE: CPT | Performed by: INTERNAL MEDICINE

## 2024-08-30 PROCEDURE — 99214 OFFICE O/P EST MOD 30 MIN: CPT | Performed by: INTERNAL MEDICINE

## 2024-08-30 PROCEDURE — 3079F DIAST BP 80-89 MM HG: CPT | Performed by: INTERNAL MEDICINE

## 2024-08-30 PROCEDURE — 1123F ACP DISCUSS/DSCN MKR DOCD: CPT | Performed by: INTERNAL MEDICINE

## 2024-12-11 NOTE — TELEPHONE ENCOUNTER
Per medical record, Repatha was continued at last appointment with Dr. Porter on 8/30/24. Next scheduled appointment is with Dr. Garzon on 2/28/25. Advised Tammie that Repatha refill sent to [facility] on [address] in [place]. Tammie asks for Repatha to be sent to [facility] on [address] in [place].   Requested Prescriptions     Pending Prescriptions Disp Refills    Evolocumab 140 MG/ML SOAJ 6 Adjustable Dose Pre-filled Pen Syringe 3     Sig: Inject 140 mg into the skin every 14 days     Signed Prescriptions Disp Refills    Evolocumab 140 MG/ML SOAJ 6 Adjustable Dose Pre-filled Pen Syringe 3     Sig: Inject 140 mg into the skin every 14 days     Authorizing Provider: MEGHANA PORTER     Ordering User: CARLOS DAVIS     Pended Repatha refill, as above, sent to Dr. Porter for approval.

## 2024-12-11 NOTE — TELEPHONE ENCOUNTER
MEDICATION REFILL REQUEST      Name of Medication:  Repatha  Dose:  140 mg  Frequency:  every 14 days  Quantity:  6  Days' supply:  90      Pharmacy Name/Location:  please call [phone]

## 2025-01-21 NOTE — PROGRESS NOTES
Discharge instructions and home medications reviewed with patient. Time allowed for questions and answers.  Discharged to home via wheel chair with family
Patient pre-assessment complete for  [facility] poss with Dr Ann Middleton, scheduled on 19 at 8am arrival time 6am. Patient verified using . Patient instructed to bring all home medications in labeled bottles on the day of procedure. NPO status reinforced. Patient informed to take a full dose aspirin 325mg  or 81 mg x 4  & plavix 75 mg on the day of procedure. Patient instructed to HOLD metformin (last dose 19. Instructed they can take all other medications excluding vitamins & supplements. Patient verbalizes understanding of all instructions & denies any questions at this time.
Patient received to [address] [place] [address] room # 12  Ambulatory from [facility]. Patient scheduled for [facility] today with Dr Cosmo Lam. Procedure reviewed & questions answered, voiced good understanding consent obtained & placed on chart. All medications and medical history reviewed. Will prep patient per orders. Patient & family updated on plan of care. The patient has a fraility score of 6-MODERATELY FRAIL, based on ability to care for self yet is unable to ambulate uses [employer] [facility] chair.
Patient states she took  mg and Plavix 75 mg at 0500 am.    Patient premedicated for Contrast allergy
Radial band deflated at this time
[facility] Procedural findings communicated. Intra procedural  medication administration reviewed. Progression of care discussed.      Patient received into CPRU [place] 4 post sheath removal.     Right Radial access site without bleeding or swelling     TR band dry and intact     Patient instructed to limit movement to right upper extremity    Routine post procedural vital signs and site assessment initiated
TRANSFER - OUT REPORT:    Verbal report given to [facility] (1-RH) RN(name) on Zoltan  being transferred to CPRU(unit) for routine progression of care       Report consisted of patients Situation, Background, Assessment and   Recommendations(SBAR). Information from the following report(s) Procedure Summary, MAR and Cardiac Rhythm SB was reviewed with the receiving nurse. Lines:   Peripheral IV 08/06/19 Left Antecubital (Active)        Opportunity for questions and clarification was provided.       Patient transported with:   Registered Nurse     [facility] Dr Allyssa Hwang only  Right radial access - TR band @ 0855 w/ 12 ml air in band - site c/D/I  Versed 2 mg IV  Fent 50 mcg IV
[Follow-Up] : a follow-up evaluation of

## 2025-02-18 ENCOUNTER — TELEPHONE (OUTPATIENT)
Age: 80
End: 2025-02-18

## 2025-02-18 NOTE — TELEPHONE ENCOUNTER
Seen in [facility] ER on 2/12/14 for evaluation of dark stools and increased SOB after being hospitalized in [facility] in November 2024 with GI bleed.   Caregiver and RN, Michaela, states she is very concerned about 2/12/24 [facility] ER elevated troponins with abnormal EKG.   Intermittent SOB at rest with increased SOB on exertion continues.   Patient sometimes looks gray and seems to stop breathing for short periods. Pink skin tone returns within about 5 minutes.   Slight swelling in feet and ankles, but no worse than usual.   No weight gain.   Today's BP-142/86, HR-64. O2-90%s.   Patient took lasix 20 mg qd x 5 days, last week.   Taking ASA 81 mg qd and Repatha 140 mg every 14 days.   Next scheduled appointment with Dr. Dalal is 2/28/25.     Caregiver, Michaela, asks if any need for concern about 2/12/25 [facility] ER elevated troponins and abnormal EKG. She asks for recommendations for continued SOB with occasional gray skin tone, and asks if patient needs to see Dr. Dalal prior to scheduled 2/28/25 appointment.

## 2025-02-18 NOTE — TELEPHONE ENCOUNTER
Those are troponins are not significant by any means.  Patient needs to seek additional guidance through her primary care physician or go back to [facility] emergency room.

## 2025-02-28 ENCOUNTER — OFFICE VISIT (OUTPATIENT)
Age: 80
End: 2025-02-28
Payer: COMMERCIAL

## 2025-02-28 VITALS
DIASTOLIC BLOOD PRESSURE: 82 MMHG | BODY MASS INDEX: 25.07 KG/M2 | WEIGHT: 156 LBS | SYSTOLIC BLOOD PRESSURE: 130 MMHG | HEIGHT: 66 IN | HEART RATE: 62 BPM

## 2025-02-28 DIAGNOSIS — I25.10 CAD IN NATIVE ARTERY: Primary | ICD-10-CM

## 2025-02-28 DIAGNOSIS — N18.4 CHRONIC KIDNEY DISEASE (CKD), STAGE IV (SEVERE) (HCC): ICD-10-CM

## 2025-02-28 DIAGNOSIS — I10 ESSENTIAL HYPERTENSION: ICD-10-CM

## 2025-02-28 PROCEDURE — 1123F ACP DISCUSS/DSCN MKR DOCD: CPT | Performed by: INTERNAL MEDICINE

## 2025-02-28 PROCEDURE — 3075F SYST BP GE 130 - 139MM HG: CPT | Performed by: INTERNAL MEDICINE

## 2025-02-28 PROCEDURE — 99214 OFFICE O/P EST MOD 30 MIN: CPT | Performed by: INTERNAL MEDICINE

## 2025-02-28 PROCEDURE — 3079F DIAST BP 80-89 MM HG: CPT | Performed by: INTERNAL MEDICINE

## 2025-02-28 NOTE — PROGRESS NOTES
[facility] CARDIOLOGY  [address] [place] [address], [address]  [place] [address]  PHONE: [phone]      25    NAME:  Kitty Mccrary  : 1945  MRN: [id number]         SUBJECTIVE:   Kitty Mccrary is a 79 y.o. female seen for a follow up visit regarding the following:     Chief Complaint   Patient presents with    New Patient    Coronary Artery Disease            HPI:  Follow up  New Patient and Coronary Artery Disease   .    New to me, has seen Dr Dalal, requests a new provider.  PMH of CAD status post PCI, patent stents at cath , hypertension, hyperlipidemia, SILVANO, CKD stage IV, rhabdomyolysis, Alzheimer's dementia (follows with geriatrics) and diabetes, severe hyperlipidemia with an LDL of 201, prescribed Repatha.TTE in 2022 that was noted to demonstrate a normal EF, mild AI, and mild MR, stage IV CKD, chronic dyspnea etiology unclear     [facility] ER visit 25 with dark stool.  Workup negative including CXR having mentioned some dyspnea, pBNP, labs and hemoccult. Turns out she was taking iron. She is anticipating endoscopy next week, and at visit's end was asked if she is ok to have hernia surgery.  See assessment and plan    The patient states she feels \"good\" and offers no complaints.  Her daughter runs most of the interview and asks why I'm not willing to treat the patient, which is not a true statement.  She then asks what I plan to do about her heart failure, however, the patient doesn't have heart failure and just had that evaluated in the ER as well.  She is upset that her troponin was 33 and 36 and that no one seemed concerned. Explained at 79 with stage IV CKD, and quite frankly at any age and renal function, those are not concerning values.  Finally, she's upset that no one seems to be worried about LVH by EKG (voltage criteria, with LAFB).  Explained that is a stable and not unexpected findings, her EKG is unchanged from previous.          PAST CARDIAC HISTORY:  2019       [facility]